# Patient Record
Sex: FEMALE | Race: BLACK OR AFRICAN AMERICAN | Employment: FULL TIME | URBAN - METROPOLITAN AREA
[De-identification: names, ages, dates, MRNs, and addresses within clinical notes are randomized per-mention and may not be internally consistent; named-entity substitution may affect disease eponyms.]

---

## 2017-05-08 ENCOUNTER — OFFICE VISIT (OUTPATIENT)
Dept: ORTHOPEDIC SURGERY | Age: 42
End: 2017-05-08

## 2017-05-08 VITALS
SYSTOLIC BLOOD PRESSURE: 121 MMHG | HEIGHT: 61 IN | DIASTOLIC BLOOD PRESSURE: 68 MMHG | BODY MASS INDEX: 35.87 KG/M2 | WEIGHT: 190 LBS

## 2017-05-08 DIAGNOSIS — M25.461 EFFUSION OF RIGHT KNEE: ICD-10-CM

## 2017-05-08 DIAGNOSIS — M25.561 RIGHT KNEE PAIN, UNSPECIFIED CHRONICITY: Primary | ICD-10-CM

## 2017-05-08 PROCEDURE — 73564 X-RAY EXAM KNEE 4 OR MORE: CPT | Performed by: ORTHOPAEDIC SURGERY

## 2017-05-08 PROCEDURE — 20610 DRAIN/INJ JOINT/BURSA W/O US: CPT | Performed by: ORTHOPAEDIC SURGERY

## 2017-05-08 PROCEDURE — 99214 OFFICE O/P EST MOD 30 MIN: CPT | Performed by: ORTHOPAEDIC SURGERY

## 2017-06-28 DIAGNOSIS — M25.461 EFFUSION OF KNEE JOINT RIGHT: Primary | ICD-10-CM

## 2017-06-28 RX ORDER — MELOXICAM 15 MG/1
15 TABLET ORAL DAILY
Qty: 30 TABLET | Refills: 3 | Status: SHIPPED | OUTPATIENT
Start: 2017-06-28 | End: 2019-12-20

## 2017-06-29 ENCOUNTER — TELEPHONE (OUTPATIENT)
Dept: ORTHOPEDIC SURGERY | Age: 42
End: 2017-06-29

## 2017-07-19 LAB
AVERAGE GLUCOSE: NORMAL
HBA1C MFR BLD: 8.6 %

## 2017-07-24 ENCOUNTER — OFFICE VISIT (OUTPATIENT)
Dept: ORTHOPEDIC SURGERY | Age: 42
End: 2017-07-24

## 2017-07-24 VITALS
HEIGHT: 61 IN | WEIGHT: 182.98 LBS | DIASTOLIC BLOOD PRESSURE: 70 MMHG | SYSTOLIC BLOOD PRESSURE: 126 MMHG | BODY MASS INDEX: 34.55 KG/M2

## 2017-07-24 DIAGNOSIS — S83.231D COMPLEX TEAR OF MEDIAL MENISCUS OF RIGHT KNEE AS CURRENT INJURY, SUBSEQUENT ENCOUNTER: Primary | ICD-10-CM

## 2017-07-24 PROCEDURE — 99213 OFFICE O/P EST LOW 20 MIN: CPT | Performed by: ORTHOPAEDIC SURGERY

## 2017-08-01 ENCOUNTER — OFFICE VISIT (OUTPATIENT)
Dept: ORTHOPEDIC SURGERY | Age: 42
End: 2017-08-01

## 2017-08-01 DIAGNOSIS — S83.241D TEAR OF MEDIAL MENISCUS OF RIGHT KNEE, CURRENT, UNSPECIFIED TEAR TYPE, SUBSEQUENT ENCOUNTER: Primary | ICD-10-CM

## 2017-08-01 PROBLEM — S83.241A TEAR OF MEDIAL MENISCUS OF RIGHT KNEE, CURRENT: Status: ACTIVE | Noted: 2017-08-01

## 2017-08-01 PROCEDURE — 99213 OFFICE O/P EST LOW 20 MIN: CPT | Performed by: ORTHOPAEDIC SURGERY

## 2017-08-09 ENCOUNTER — OFFICE VISIT (OUTPATIENT)
Dept: ORTHOPEDIC SURGERY | Age: 42
End: 2017-08-09

## 2017-08-09 ENCOUNTER — TELEPHONE (OUTPATIENT)
Dept: ORTHOPEDIC SURGERY | Age: 42
End: 2017-08-09

## 2017-08-09 VITALS
WEIGHT: 182.98 LBS | SYSTOLIC BLOOD PRESSURE: 120 MMHG | BODY MASS INDEX: 34.55 KG/M2 | HEIGHT: 61 IN | DIASTOLIC BLOOD PRESSURE: 71 MMHG

## 2017-08-09 DIAGNOSIS — S83.241D TEAR OF MEDIAL MENISCUS OF RIGHT KNEE, CURRENT, UNSPECIFIED TEAR TYPE, SUBSEQUENT ENCOUNTER: Primary | ICD-10-CM

## 2017-08-09 PROCEDURE — 20610 DRAIN/INJ JOINT/BURSA W/O US: CPT | Performed by: ORTHOPAEDIC SURGERY

## 2017-08-09 PROCEDURE — 99212 OFFICE O/P EST SF 10 MIN: CPT | Performed by: ORTHOPAEDIC SURGERY

## 2017-08-09 PROCEDURE — L1832 KO ADJ JNT POS R SUP PRE CST: HCPCS | Performed by: ORTHOPAEDIC SURGERY

## 2017-08-10 ENCOUNTER — TELEPHONE (OUTPATIENT)
Dept: ORTHOPEDIC SURGERY | Age: 42
End: 2017-08-10

## 2017-08-24 ENCOUNTER — HOSPITAL ENCOUNTER (OUTPATIENT)
Dept: SURGERY | Age: 42
Discharge: HOME | End: 2017-08-24
Attending: ORTHOPAEDIC SURGERY | Admitting: ORTHOPAEDIC SURGERY

## 2017-08-24 VITALS
DIASTOLIC BLOOD PRESSURE: 78 MMHG | TEMPERATURE: 97 F | OXYGEN SATURATION: 100 % | WEIGHT: 188.4 LBS | RESPIRATION RATE: 15 BRPM | HEART RATE: 85 BPM | SYSTOLIC BLOOD PRESSURE: 121 MMHG | BODY MASS INDEX: 35.57 KG/M2 | HEIGHT: 61 IN

## 2017-08-24 LAB
GLUCOSE BLD-MCNC: 125 MG/DL (ref 70–99)
GLUCOSE BLD-MCNC: 137 MG/DL (ref 70–99)
HCG(URINE) PREGNANCY TEST: NEGATIVE
PERFORMED ON: ABNORMAL
PERFORMED ON: ABNORMAL
REASON FOR REJECTION: NORMAL
REJECTED TEST: NORMAL

## 2017-08-24 RX ORDER — IBUPROFEN 800 MG/1
800 TABLET ORAL EVERY 6 HOURS PRN
Qty: 60 TABLET | Refills: 3 | Status: SHIPPED | OUTPATIENT
Start: 2017-08-24 | End: 2020-06-22 | Stop reason: ALTCHOICE

## 2017-08-24 RX ORDER — LIDOCAINE HYDROCHLORIDE 10 MG/ML
1 INJECTION, SOLUTION EPIDURAL; INFILTRATION; INTRACAUDAL; PERINEURAL
Status: ACTIVE | OUTPATIENT
Start: 2017-08-24 | End: 2017-08-24

## 2017-08-24 RX ORDER — SODIUM CHLORIDE 9 MG/ML
INJECTION, SOLUTION INTRAVENOUS CONTINUOUS
Status: DISCONTINUED | OUTPATIENT
Start: 2017-08-24 | End: 2017-08-25 | Stop reason: HOSPADM

## 2017-08-24 RX ORDER — SODIUM CHLORIDE 0.9 % (FLUSH) 0.9 %
10 SYRINGE (ML) INJECTION PRN
Status: DISCONTINUED | OUTPATIENT
Start: 2017-08-24 | End: 2017-08-25 | Stop reason: HOSPADM

## 2017-08-24 RX ORDER — PROMETHAZINE HYDROCHLORIDE 25 MG/ML
6.25 INJECTION, SOLUTION INTRAMUSCULAR; INTRAVENOUS EVERY 30 MIN PRN
Status: DISCONTINUED | OUTPATIENT
Start: 2017-08-24 | End: 2017-08-25 | Stop reason: HOSPADM

## 2017-08-24 RX ORDER — SODIUM CHLORIDE 0.9 % (FLUSH) 0.9 %
10 SYRINGE (ML) INJECTION EVERY 12 HOURS SCHEDULED
Status: DISCONTINUED | OUTPATIENT
Start: 2017-08-24 | End: 2017-08-25 | Stop reason: HOSPADM

## 2017-08-24 RX ORDER — FENTANYL CITRATE 50 UG/ML
25 INJECTION, SOLUTION INTRAMUSCULAR; INTRAVENOUS EVERY 5 MIN PRN
Status: DISCONTINUED | OUTPATIENT
Start: 2017-08-24 | End: 2017-08-25 | Stop reason: HOSPADM

## 2017-08-24 RX ORDER — HYDROMORPHONE HCL 110MG/55ML
0.5 PATIENT CONTROLLED ANALGESIA SYRINGE INTRAVENOUS EVERY 5 MIN PRN
Status: DISCONTINUED | OUTPATIENT
Start: 2017-08-24 | End: 2017-08-25 | Stop reason: HOSPADM

## 2017-08-24 RX ORDER — ACETAMINOPHEN 500 MG
1000 TABLET ORAL EVERY 6 HOURS PRN
Qty: 60 TABLET | Refills: 3 | Status: SHIPPED | OUTPATIENT
Start: 2017-08-24 | End: 2020-06-22 | Stop reason: ALTCHOICE

## 2017-08-24 RX ORDER — OXYCODONE HYDROCHLORIDE 5 MG/1
5 TABLET ORAL EVERY 4 HOURS PRN
Qty: 60 TABLET | Refills: 0 | Status: SHIPPED | OUTPATIENT
Start: 2017-08-24 | End: 2017-08-31

## 2017-08-24 RX ORDER — CEFAZOLIN SODIUM 2 G/100ML
2 INJECTION, SOLUTION INTRAVENOUS
Status: COMPLETED | OUTPATIENT
Start: 2017-08-24 | End: 2017-08-24

## 2017-08-24 RX ORDER — HYDROMORPHONE HCL 110MG/55ML
0.25 PATIENT CONTROLLED ANALGESIA SYRINGE INTRAVENOUS EVERY 5 MIN PRN
Status: DISCONTINUED | OUTPATIENT
Start: 2017-08-24 | End: 2017-08-25 | Stop reason: HOSPADM

## 2017-08-24 RX ORDER — LIDOCAINE HYDROCHLORIDE 10 MG/ML
0.5 INJECTION, SOLUTION EPIDURAL; INFILTRATION; INTRACAUDAL; PERINEURAL ONCE
Status: DISCONTINUED | OUTPATIENT
Start: 2017-08-24 | End: 2017-08-25 | Stop reason: HOSPADM

## 2017-08-24 RX ORDER — DIPHENHYDRAMINE HYDROCHLORIDE 50 MG/ML
12.5 INJECTION INTRAMUSCULAR; INTRAVENOUS
Status: ACTIVE | OUTPATIENT
Start: 2017-08-24 | End: 2017-08-24

## 2017-08-24 RX ORDER — KETOROLAC TROMETHAMINE 30 MG/ML
30 INJECTION, SOLUTION INTRAMUSCULAR; INTRAVENOUS ONCE
Status: COMPLETED | OUTPATIENT
Start: 2017-08-24 | End: 2017-08-24

## 2017-08-24 RX ORDER — HYDROCODONE BITARTRATE AND ACETAMINOPHEN 5; 325 MG/1; MG/1
1 TABLET ORAL PRN
Status: ACTIVE | OUTPATIENT
Start: 2017-08-24 | End: 2017-08-24

## 2017-08-24 RX ORDER — FENTANYL CITRATE 50 UG/ML
50 INJECTION, SOLUTION INTRAMUSCULAR; INTRAVENOUS EVERY 5 MIN PRN
Status: DISCONTINUED | OUTPATIENT
Start: 2017-08-24 | End: 2017-08-25 | Stop reason: HOSPADM

## 2017-08-24 RX ORDER — HYDROCODONE BITARTRATE AND ACETAMINOPHEN 5; 325 MG/1; MG/1
2 TABLET ORAL PRN
Status: ACTIVE | OUTPATIENT
Start: 2017-08-24 | End: 2017-08-24

## 2017-08-24 RX ORDER — MEPERIDINE HYDROCHLORIDE 25 MG/ML
12.5 INJECTION INTRAMUSCULAR; INTRAVENOUS; SUBCUTANEOUS EVERY 5 MIN PRN
Status: DISCONTINUED | OUTPATIENT
Start: 2017-08-24 | End: 2017-08-25 | Stop reason: HOSPADM

## 2017-08-24 RX ADMIN — Medication: at 14:42

## 2017-08-24 RX ADMIN — CEFAZOLIN SODIUM 2 G: 2 INJECTION, SOLUTION INTRAVENOUS at 12:26

## 2017-08-24 RX ADMIN — KETOROLAC TROMETHAMINE 30 MG: 30 INJECTION, SOLUTION INTRAMUSCULAR; INTRAVENOUS at 14:31

## 2017-08-24 RX ADMIN — Medication 0.5 MG: at 14:49

## 2017-08-24 ASSESSMENT — PAIN SCALES - GENERAL
PAINLEVEL_OUTOF10: 10
PAINLEVEL_OUTOF10: 9
PAINLEVEL_OUTOF10: 10

## 2017-08-24 ASSESSMENT — PAIN - FUNCTIONAL ASSESSMENT: PAIN_FUNCTIONAL_ASSESSMENT: 0-10

## 2017-08-24 ASSESSMENT — PAIN DESCRIPTION - DESCRIPTORS: DESCRIPTORS: ACHING

## 2017-08-25 DIAGNOSIS — S83.241D ACUTE MEDIAL MENISCUS TEAR OF RIGHT KNEE, SUBSEQUENT ENCOUNTER: Primary | ICD-10-CM

## 2017-08-30 ENCOUNTER — OFFICE VISIT (OUTPATIENT)
Dept: ORTHOPEDIC SURGERY | Age: 42
End: 2017-08-30

## 2017-08-30 DIAGNOSIS — S83.241D TEAR OF MEDIAL MENISCUS OF RIGHT KNEE, CURRENT, UNSPECIFIED TEAR TYPE, SUBSEQUENT ENCOUNTER: Primary | ICD-10-CM

## 2017-08-30 PROCEDURE — 99024 POSTOP FOLLOW-UP VISIT: CPT | Performed by: ORTHOPAEDIC SURGERY

## 2017-09-11 ENCOUNTER — HOSPITAL ENCOUNTER (OUTPATIENT)
Dept: PHYSICAL THERAPY | Age: 42
Discharge: HOME | End: 2017-09-30
Admitting: ORTHOPAEDIC SURGERY

## 2017-09-11 NOTE — PLAN OF CARE
today with double crutches PWB and no brace. Pt instructed as NWB and to wear brace. Orthopedic Special Tests: NT d/t recent sx                        [x] Patient history, allergies, meds reviewed. Medical chart reviewed. See intake form. Review Of Systems (ROS):  [x]Performed Review of systems (Integumentary, CardioPulmonary, Neurological) by intake and observation. Intake form has been scanned into medical record. Patient has been instructed to contact their primary care physician regarding ROS issues if not already being addressed at this time. Co-morbidities/Complexities (which will affect course of rehabilitation):   []None           Arthritic conditions   []Rheumatoid arthritis (M05.9)  []Osteoarthritis (M19.91)   Cardiovascular conditions   []Hypertension (I10)  []Hyperlipidemia (E78.5)  []Angina pectoris (I20)  []Atherosclerosis (I70)   Musculoskeletal conditions   []Disc pathology   []Congenital spine pathologies   []Prior surgical intervention  []Osteoporosis (M81.8)  []Osteopenia (M85.8)   Endocrine conditions   []Hypothyroid (E03.9)  []Hyperthyroid Gastrointestinal conditions   []Constipation (A33.27)   Metabolic conditions   []Morbid obesity (E66.01)  [x]Diabetes type 1(E10.65) or 2 (E11.65)   []Neuropathy (G60.9)     Pulmonary conditions   []Asthma (J45)  []Coughing   []COPD (J44.9)   Psychological Disorders  []Anxiety (F41.9)  []Depression (F32.9)   []Other:   []Other:          Barriers to/and or personal factors that will affect rehab potential:              [x]Age  [x]Sex              [x]Motivation/Lack of Motivation                        []Co-Morbidities              []Cognitive Function, education/learning barriers              []Environmental, home barriers              []profession/work barriers  [x]past PT/medical experience  []other:  Justification:    Falls Risk Assessment (30 days):  [x] Falls Risk assessed and no intervention required.   [] Falls Risk assessed and Patient requires intervention due to being higher risk   TUG score (>12s at risk):     [] Falls education provided, including       G-Codes:  PT G-Codes  Functional Assessment Tool Used: LEFS  Score: 9/80  Functional Limitation: Mobility: Walking and moving around  Mobility: Walking and Moving Around Current Status (): At least 60 percent but less than 80 percent impaired, limited or restricted  Mobility: Walking and Moving Around Goal Status (): At least 1 percent but less than 20 percent impaired, limited or restricted    ASSESSMENT:   Functional Impairments:     [x]Noted lumbar/proximal hip/LE joint hypomobility   [x]Decreased LE functional ROM   [x]Decreased core/proximal hip strength and neuromuscular control   [x]Decreased LE functional strength   [x]Reduced balance/proprioceptive control   []other:      Functional Activity Limitations (from functional questionnaire and intake)   []Reduced ability to tolerate prolonged functional positions   []Reduced ability or difficulty with changes of positions or transfers between positions   []Reduced ability to maintain good posture and demonstrate good body mechanics with sitting, bending, and lifting   []Reduced ability to sleep   [x] Reduced ability or tolerance with driving and/or computer work   []Reduced ability to perform lifting, carrying tasks   [x]Reduced ability to squat   []Reduced ability to forward bend   [x]Reduced ability to ambulate prolonged functional periods/distances/surfaces   [x]Reduced ability to ascend/descend stairs   [x]Reduced ability to run, hop, cut or jump   []other:    Participation Restrictions   [x]Reduced participation in self care activities   [x]Reduced participation in home management activities   [x]Reduced participation in work activities   [x]Reduced participation in social activities. [x]Reduced participation in sport/recreation activities.     Classification :    [x]Signs/symptoms consistent with post-surgical status Therapeutic exercise including: strength training, ROM, for Lower extremity and core   [x]  NMR activation and proprioception for LE, Glutes and Core   [x]  Manual therapy as indicated for LE, Hip and spine to include: Dry Needling/IASTM, STM, PROM, Gr I-IV mobilizations, manipulation. [x] Modalities as needed that may include: thermal agents, E-stim, Biofeedback, US, iontophoresis as indicated  [x] Patient education on joint protection, postural re-education, activity modification, progression of HEP. HEP instruction:(see scanned forms)    GOALS:  Patient stated goal: Return to all normal activity     Therapist goals for Patient:   Short Term Goals: To be achieved in: 2 weeks  1. Independent in HEP and progression per patient tolerance, in order to prevent re-injury. 2. Patient will have a decrease in pain to facilitate improvement in movement, function, and ADLs as indicated by Functional Deficits. Long Term Goals: To be achieved in: 12 weeks  1. Disability index score of 25% or less for the LEFS to assist with reaching prior level of function. 2. Patient will demonstrate increased AROM to 0-115 degrees to allow for proper joint functioning as indicated by patients Functional Deficits. 3. Patient will demonstrate an increase in Strength to good proximal hip strength and control in LE to allow for proper functional mobility as indicated by patients Functional Deficits. 4. Patient will return to all functional activities without increased symptoms or restriction. 5. Pt will ambulate with a normalized gait pattern with LRAD. Electronically signed by:   Any Moyer PT

## 2017-09-11 NOTE — FLOWSHEET NOTE
[address], [facility]    Physical Therapy Daily Treatment Note  Date:  2017    Patient Name:  Sapphire Giles    :  1975  MRN: [id number]  Restrictions/Precautions:    Medical/Treatment Diagnosis Information:  Diagnosis: S83.241D (Medial meniscus tear R)  Treatment Diagnosis: S83.241D (Medial meniscus tear R)  Insurance/Certification information:  PT Insurance Information:   Physician Information:  Referring Practitioner: Dr. Glover Presume of care signed (Y/N):     Date of Patient follow up with Physician:     G-Code (if applicable):      Date G-Code Applied:    PT G-Codes  Functional Assessment Tool Used: LEFS  Score:   Functional Limitation: Mobility: Walking and moving around  Mobility: Walking and Moving Around Current Status (): At least 60 percent but less than 80 percent impaired, limited or restricted  Mobility: Walking and Moving Around Goal Status ():  At least 1 percent but less than 20 percent impaired, limited or restricted    Progress Note: [x]  Yes  []  No  Next due by: Visit #10       Latex Allergy:  [x]NO      []YES  Preferred Language for Healthcare:   [x]English       []other:    Visit # Insurance Allowable    30     Pain level:  6/10     SUBJECTIVE:  See eval    OBJECTIVE: See eval  Observation:   Test measurements:      RESTRICTIONS/PRECAUTIONS: R meniscal repair 17    Exercises/Interventions:     Therapeutic Ex Resistance Sets/sec Reps Notes   Retro Stepper/BIKE       Quad sets  3 10    3 way t-band ankle  2 10    3 way SLR       SAQ       Clam ABD       Hip Ext Kaitlin Ervin       Bosu fwd/side lunge       Slide Lunge       Leg Press Ecc 0-       Cybex HS curl       TKE       Glute side walks       BOSU squat              Extension prop  10 10 s           Manual Intervention       Knee mobs/PROM  6 min  Ext grade 1/2   Tib/Fem Mobs       Patella Mobs  2 min     Ankle mobs                     NMR

## 2017-09-20 ENCOUNTER — HOSPITAL ENCOUNTER (OUTPATIENT)
Dept: PHYSICAL THERAPY | Age: 42
Discharge: HOME | End: 2017-09-20
Admitting: ORTHOPAEDIC SURGERY

## 2017-09-22 ENCOUNTER — HOSPITAL ENCOUNTER (OUTPATIENT)
Dept: PHYSICAL THERAPY | Age: 42
Discharge: HOME | End: 2017-09-22
Admitting: ORTHOPAEDIC SURGERY

## 2017-10-02 ENCOUNTER — HOSPITAL ENCOUNTER (OUTPATIENT)
Dept: PHYSICAL THERAPY | Age: 42
Discharge: HOME | End: 2017-10-02
Admitting: ORTHOPAEDIC SURGERY

## 2017-10-02 NOTE — FLOWSHEET NOTE
EOB Knee flexion AAROM  10\" 10    Extension prop       longsit hamstring/ gastroc stretch  30\" 3x ea    Knee flexion ROM/ SB rolls       Manual Intervention  15'       Knee mobs/PROM  8 min  Ext grade 1/2   Tib/Fem Mobs       Patella Mobs  8 min     Ankle mobs                     NMR re-education       Tunisian/Biofeedback 10/10       G. Med activaiton/sidelying       G. Max Activation/prone       Hip Ext full ROM G. Activation       Bosu Bal and Prop- G Med       Single leg stance/Balance/Prop       Bosu Retro G. Med act        [place] Estim with quad sets  5'  10\" on/10\" off              Therapeutic Exercise and NMR EXR  [] (83342) Provided verbal/tactile cueing for activities related to strengthening, flexibility, endurance, ROM for improvements in LE, proximal hip, and core control with self care, mobility, lifting, ambulation.  [] (06299) Provided verbal/tactile cueing for activities related to improving balance, coordination, kinesthetic sense, posture, motor skill, proprioception  to assist with LE, proximal hip, and core control in self care, mobility, lifting, ambulation and eccentric single leg control.      NMR and Therapeutic Activities:    [] (85465 or 08311) Provided verbal/tactile cueing for activities related to improving balance, coordination, kinesthetic sense, posture, motor skill, proprioception and motor activation to allow for proper function of core, proximal hip and LE with self care and ADLs  [] (23308) Gait Re-education- Provided training and instruction to the patient for proper LE, core and proximal hip recruitment and positioning and eccentric body weight control with ambulation re-education including up and down stairs     Home Exercise Program:    [x] (68761) Reviewed/Progressed HEP activities related to strengthening, flexibility, endurance, ROM of core, proximal hip and LE for functional self-care, mobility, lifting and ambulation/stair navigation   [] (81884)Reviewed/Progressed HEP activities related to improving balance, coordination, kinesthetic sense, posture, motor skill, proprioception of core, proximal hip and LE for self care, mobility, lifting, and ambulation/stair navigation      Manual Treatments:  PROM / STM / Oscillations-Mobs:  G-I, II, III, IV (PA's, Inf., Post.)  [x] (99856) Provided manual therapy to mobilize LE, proximal hip and/or LS spine soft tissue/joints for the purpose of modulating pain, promoting relaxation,  increasing ROM, reducing/eliminating soft tissue swelling/inflammation/restriction, improving soft tissue extensibility and allowing for proper ROM for normal function with self care, mobility, lifting and ambulation. Modalities:  13' PM/CP    Charges:  Timed Code Treatment Minutes: 50 min   Total Treatment Minutes: 65     [] EVAL  [x] JA(83682) x  1   [] IONTO   [x] NMR (89284) x  1   [] VASO  [x] Manual (65122) x  1    [] Other:  [] TA x       [] Mech Traction (84622)  [] ES(attended) (02639)      [x] ES (un) (43747):     GOALS:   Patient stated goal: Return to all normal activity     Therapist goals for Patient:   Short Term Goals: To be achieved in: 2 weeks  1. Independent in HEP and progression per patient tolerance, in order to prevent re-injury. 2. Patient will have a decrease in pain to facilitate improvement in movement, function, and ADLs as indicated by Functional Deficits. Long Term Goals: To be achieved in: 12 weeks  1. Disability index score of 25% or less for the LEFS to assist with reaching prior level of function. 2. Patient will demonstrate increased AROM to 0-115 degrees to allow for proper joint functioning as indicated by patients Functional Deficits. 3. Patient will demonstrate an increase in Strength to good proximal hip strength and control in LE to allow for proper functional mobility as indicated by patients Functional Deficits. 4. Patient will return to all functional activities without increased symptoms or restriction. 5. Pt will ambulate with a normalized gait pattern with LRAD. Progression Towards Functional goals:  [] Patient is progressing as expected towards functional goals listed. [] Progression is slowed due to complexities listed. [] Progression has been slowed due to co-morbidities. [x] Plan just implemented, too soon to assess goals progression  [] Other:     ASSESSMENT:           Treatment/Activity Tolerance:  [] Patient tolerated treatment well [x] Patient limited by fatique  [] Patient limited by pain  [x] Other: continues to demonstrate poor slow VMO, quad contraction, knee flexion and extension have improved    Prognosis: [x] Good [] Fair  [] Poor    Patient Requires Follow-up: [x] Yes  [] No    PLAN: Pt to continue with PT 2x/week. Pt to maintain TTWB status and knee flexion limited to 90 degrees until 6 weeks s/p. Pt sees MD on 10/13/17.  Progress HEP as able, increase quad activity  [x] Continue per plan of care [] Alter current plan (see comments)  [] Plan of care initiated [] Hold pending MD visit [] Discharge    Electronically signed by: Leeann Rodgers PTA [id number]

## 2017-10-10 ENCOUNTER — HOSPITAL ENCOUNTER (OUTPATIENT)
Dept: PHYSICAL THERAPY | Age: 42
Discharge: HOME | End: 2017-10-10
Admitting: ORTHOPAEDIC SURGERY

## 2017-10-10 NOTE — FLOWSHEET NOTE
[address], [facility]    Physical Therapy Daily Treatment Note  Date:  10/10/2017    Patient Name:  Dawit Gonzalez    :  1975  MRN: [id number]  Restrictions/Precautions:    Medical/Treatment Diagnosis Information:  Diagnosis: S83.241D (Medial meniscus tear R)  Treatment Diagnosis: S83.241D (Medial meniscus tear R)  Insurance/Certification information:  PT Insurance Information:   Physician Information:  Referring Practitioner: Dr. Perez Portal of care signed (Y/N):     Date of Patient follow up with Physician:     G-Code (if applicable):      Date G-Code Applied:         Progress Note: [x]  Yes  []  No  Next due by: Visit #10       Latex Allergy:  [x]NO      []YES  Preferred Language for Healthcare:   [x]English       []other:    Visit # Insurance Allowable        Pain level:  2/10     SUBJECTIVE:  6+ weeks s/p. Pt reports that her knee has been feeling pretty good. Pt states that she has been ambulating with her brace, but with no crutches. Pt to follow up with MD 10-13-17. OBJECTIVE:   Observation:   Test measurements: 17: Knee flexion AAROM = 70 degrees (w/ EOB knee flexion)    17   ROM/ 75 w/ SB, tight hamstrings w/ PROM. Poor quad contraction. Ecchymosis over distal quad  10/2/17: Knee flexion ROM = 90, extension AROM = lacking 5 degrees at beginning of session, lacking 3 degrees at end of session   10/10/17: Pt ambulated into PT with antalgic gait pattern, wearing brace and without crutches.      RESTRICTIONS/PRECAUTIONS: R meniscal repair 17    Exercises/Interventions:     Therapeutic Ex  38' Resistance Sets/sec Reps Notes   Retro Stepper/BIKE       Quad sets In towel  3 10    3 way t-band ankle    SLR flexion/ abd  Hip BS adduction  2  10\" 10  10x    SAQ  2 10 5\" hold   Clam ABD orange 2 15    Bridges with SB  2 10    Bosu fwd/side lunge       Slide Lunge       Leg Press Ecc 0-       Cybex HS curl TKE blue 2 10 5\" hold   Glute side walks       BOSU squat       EOB Knee flexion AAROM     Extension prop       longsit hamstring/ gastroc stretch  30\" 3x ea    Mini squats  2 10 0-45 degrees   Leg Press-DL 60# 2 10 45-0 degrees   Knee flexion ROM/ SB rolls  3'     Manual Intervention  12'       Knee mobs/PROM  8 min  Ext grade 1/2   Tib/Fem Mobs       Patella Mobs  4 min     Ankle mobs                     NMR re-education       Micronesian/Biofeedback 10/10       G. Med activaiton/sidelying       G. Max Activation/prone       Hip Ext full ROM G. Activation       Bosu Bal and Prop- G Med       Single leg stance/Balance/Prop       Bosu Retro G. Med act        [place] Estim with quad sets             Therapeutic Exercise and NMR EXR  [] (73511) Provided verbal/tactile cueing for activities related to strengthening, flexibility, endurance, ROM for improvements in LE, proximal hip, and core control with self care, mobility, lifting, ambulation.  [] (30159) Provided verbal/tactile cueing for activities related to improving balance, coordination, kinesthetic sense, posture, motor skill, proprioception  to assist with LE, proximal hip, and core control in self care, mobility, lifting, ambulation and eccentric single leg control.      NMR and Therapeutic Activities:    [] (62725 or 93568) Provided verbal/tactile cueing for activities related to improving balance, coordination, kinesthetic sense, posture, motor skill, proprioception and motor activation to allow for proper function of core, proximal hip and LE with self care and ADLs  [] (32705) Gait Re-education- Provided training and instruction to the patient for proper LE, core and proximal hip recruitment and positioning and eccentric body weight control with ambulation re-education including up and down stairs     Home Exercise Program:    [x] (40048) Reviewed/Progressed HEP activities related to strengthening, flexibility, endurance, ROM of core, proximal hip and LE for functional self-care, mobility, lifting and ambulation/stair navigation   [] (13709)Reviewed/Progressed HEP activities related to improving balance, coordination, kinesthetic sense, posture, motor skill, proprioception of core, proximal hip and LE for self care, mobility, lifting, and ambulation/stair navigation      Manual Treatments:  PROM / STM / Oscillations-Mobs:  G-I, II, III, IV (PA's, Inf., Post.)  [x] (95905) Provided manual therapy to mobilize LE, proximal hip and/or LS spine soft tissue/joints for the purpose of modulating pain, promoting relaxation,  increasing ROM, reducing/eliminating soft tissue swelling/inflammation/restriction, improving soft tissue extensibility and allowing for proper ROM for normal function with self care, mobility, lifting and ambulation. Modalities:  13' PM/CP    Charges:  Timed Code Treatment Minutes: 50 min   Total Treatment Minutes: 65     [] EVAL  [x] BW(41341) x  2   [] IONTO   [] NMR (61687) x      [] VASO  [x] Manual (64913) x  1    [] Other:  [] TA x       [] Mech Traction (76675)  [] ES(attended) (73478)      [x] ES (un) (90918):     GOALS:   Patient stated goal: Return to all normal activity     Therapist goals for Patient:   Short Term Goals: To be achieved in: 2 weeks  1. Independent in HEP and progression per patient tolerance, in order to prevent re-injury. 2. Patient will have a decrease in pain to facilitate improvement in movement, function, and ADLs as indicated by Functional Deficits. Long Term Goals: To be achieved in: 12 weeks  1. Disability index score of 25% or less for the LEFS to assist with reaching prior level of function. 2. Patient will demonstrate increased AROM to 0-115 degrees to allow for proper joint functioning as indicated by patients Functional Deficits.    3. Patient will demonstrate an increase in Strength to good proximal hip strength and control in LE to allow for proper functional mobility as indicated by patients Functional Deficits. 4. Patient will return to all functional activities without increased symptoms or restriction. 5. Pt will ambulate with a normalized gait pattern with LRAD. Progression Towards Functional goals:  [] Patient is progressing as expected towards functional goals listed. [] Progression is slowed due to complexities listed. [] Progression has been slowed due to co-morbidities. [x] Plan just implemented, too soon to assess goals progression  [] Other:     ASSESSMENT:  Pt demonstrates slight improvement in quad activation, but continued significant weakness and neuromuscular control noted. Pt continues to lack full knee extension AROM due to pain and quad weakness. Increased therex to improve quad activation and strength. Treatment/Activity Tolerance:  [x] Patient tolerated treatment well [] Patient limited by fatique  [] Patient limited by pain  [] Other:     Prognosis: [x] Good [] Fair  [] Poor    Patient Requires Follow-up: [x] Yes  [] No    PLAN: Pt to continue with PT 2x/week. Pt sees MD on 10-13-17.   [x] Continue per plan of care [] Alter current plan (see comments)  [] Plan of care initiated [] Hold pending MD visit [] Discharge    Electronically signed by: Fareed Alarcon PTA [id number]

## 2017-10-13 ENCOUNTER — OFFICE VISIT (OUTPATIENT)
Dept: ORTHOPEDIC SURGERY | Age: 42
End: 2017-10-13

## 2017-10-13 VITALS — BODY MASS INDEX: 36.91 KG/M2 | WEIGHT: 188 LBS | HEIGHT: 60 IN

## 2017-10-13 DIAGNOSIS — S83.241D TEAR OF MEDIAL MENISCUS OF RIGHT KNEE, CURRENT, UNSPECIFIED TEAR TYPE, SUBSEQUENT ENCOUNTER: Primary | ICD-10-CM

## 2017-10-13 PROCEDURE — 99024 POSTOP FOLLOW-UP VISIT: CPT | Performed by: ORTHOPAEDIC SURGERY

## 2017-10-13 PROCEDURE — L1810 KO ELASTIC WITH JOINTS: HCPCS | Performed by: ORTHOPAEDIC SURGERY

## 2017-10-13 NOTE — PROGRESS NOTES
Chief Complaint    Pain (right knee)      History of Present Illness:  Romana Pippins is a 36 y.o. female. She is here for follow-up for her right knee. She is status post right knee arthroscopy with repair of the medial meniscal root. She's doing well at this time. She has been off of her crutches now for about a week and a half. She states she does have some soreness over the medial side of the knee but does feel like it's getting progressively better. Denies radicular pain or numbness or tingling. Medical History:  Patient's medications, allergies, past medical, surgical, social and family histories were reviewed and updated as appropriate. Review of Systems:  Pertinent items are noted in HPI  Review of systems reviewed from Patient History Form dated on 10.13.17   and available in the patient's chart under the Media tab. Vital Signs:  Ht 5' 0.05\" (1.525 m)   Wt 188 lb (85.3 kg)   BMI 36.66 kg/m²     General Exam:   Constitutional: Patient is adequately groomed with no evidence of malnutrition  DTRs: Deep tendon reflexes are intact  Mental Status: The patient is oriented to time, place and person. The patient's mood and affect are appropriate. Knee Examination:    Inspection:  Portal sites are well-healed. No erythema or drainage    Palpation:  Mild palpable effusion within the right knee. There is some tenderness along the posterior medial joint line    Range of Motion:  0° up to 110°    Strength:  She is able to do straight leg raise    Special Tests:  No instability to varus and valgus stress testing    Skin: There are no rashes, ulcerations or lesions. Additional Comments:       Additional Examinations:         Left Lower Extremity: Examination of the left lower extremity does not show any tenderness, deformity or injury. Range of motion is unremarkable. There is no gross instability. There are no rashes, ulcerations or lesions.   Strength and tone are

## 2017-10-13 NOTE — LETTER
[facility]  [address]  [address] [facility] [address]  Phone: [phone]  Fax: [phone]    Karine Flowers MD        October 13, 2017     Patient: Romana Pippins   YOB: 1975   Date of Visit: 10/13/2017       To Whom it May Concern:    Sneha Pugh was seen in my clinic on 10/13/2017. She may return back to work without restrictions effective October 16, 2017    If you have any questions or concerns, please don't hesitate to call.     Sincerely,         Karine Flowers MD

## 2017-10-19 ENCOUNTER — HOSPITAL ENCOUNTER (OUTPATIENT)
Dept: PHYSICAL THERAPY | Age: 42
Discharge: HOME | End: 2017-10-19
Admitting: ORTHOPAEDIC SURGERY

## 2017-10-20 ENCOUNTER — HOSPITAL ENCOUNTER (OUTPATIENT)
Dept: PHYSICAL THERAPY | Age: 42
Discharge: HOME | End: 2017-10-20
Admitting: ORTHOPAEDIC SURGERY

## 2017-10-20 NOTE — FLOWSHEET NOTE
[place] , [facility]    Physical Therapy Daily Treatment Note  Date:  10/20/2017    Patient Name:  Humberto Gan    :  1975  MRN: [id number]  Restrictions/Precautions:    Medical/Treatment Diagnosis Information:  Diagnosis: S83.241D (Medial meniscus tear R)  Treatment Diagnosis: S83.241D (Medial meniscus tear R)  Insurance/Certification information:  PT Insurance Information:   Physician Information:  Referring Practitioner: Dr. Estelita Phillips of care signed (Y/N):     Date of Patient follow up with Physician: Marjorie Fuentes     G-Code (if applicable):      Date G-Code Applied:         Progress Note: [x]  Yes  []  No  Next due by: Visit #10       Latex Allergy:  [x]NO      []YES  Preferred Language for Healthcare:   [x]English       []other:    Visit # Insurance Allowable        Pain level:  2/10     SUBJECTIVE: Starts new job next week and admits to not being able to come to therapy b/c of job interviews etc. Knee sore today from workout yesterday, time constraints today due to schedule. OBJECTIVE:   Observation:   Test measurements: 17: Knee flexion AAROM = 70 degrees (w/ EOB knee flexion)    17   ROM/ 75 w/ SB, tight hamstrings w/ PROM. Poor quad contraction. Ecchymosis over distal quad  10/2/17: Knee flexion ROM = 90, extension AROM = lacking 5 degrees at beginning of session, lacking 3 degrees at end of session   10/10/17: Pt ambulated into PT with antalgic gait pattern, wearing brace and without crutches.      RESTRICTIONS/PRECAUTIONS: R meniscal repair 17    Exercises/Interventions:     Therapeutic Ex   Resistance Sets/sec Reps Notes   Retro Stepper/BIKE       Quad sets   3 10    Slr+  30\"     SLR flexion/ Abd  Hip BS adduction 2#  2# 2  10\" 10  10x    SAQ 2# 2 10  w/3 sec ecc. lower   Clam ABD    Bridges with SB  2 10    Bosu fwd/side lunge       Slide Lunge       Cybex HS curl       TKE 5\" hold   Glute side motor skill, proprioception of core, proximal hip and LE for self care, mobility, lifting, and ambulation/stair navigation      Manual Treatments:  PROM / STM / Oscillations-Mobs:  G-I, II, III, IV (PA's, Inf., Post.)  [x] (51507) Provided manual therapy to mobilize LE, proximal hip and/or LS spine soft tissue/joints for the purpose of modulating pain, promoting relaxation,  increasing ROM, reducing/eliminating soft tissue swelling/inflammation/restriction, improving soft tissue extensibility and allowing for proper ROM for normal function with self care, mobility, lifting and ambulation. Modalities:  10' PM/CP    Charges:  Timed Code Treatment Minutes: 30   Total Treatment Minutes: 40     [] EVAL  [x] QI(53814) x  1   [] IONTO   [] NMR (54659) x      [] VASO  [] Manual (49720) x       [] Other:  [] TA x       [] Mech Traction (90125)  [] ES(attended) (09818)      [x] ES (un) (35158):     GOALS:   Patient stated goal: Return to all normal activity     Therapist goals for Patient:   Short Term Goals: To be achieved in: 2 weeks  1. Independent in HEP and progression per patient tolerance, in order to prevent re-injury. 2. Patient will have a decrease in pain to facilitate improvement in movement, function, and ADLs as indicated by Functional Deficits. Long Term Goals: To be achieved in: 12 weeks  1. Disability index score of 25% or less for the LEFS to assist with reaching prior level of function. 2. Patient will demonstrate increased AROM to 0-115 degrees to allow for proper joint functioning as indicated by patients Functional Deficits. 3. Patient will demonstrate an increase in Strength to good proximal hip strength and control in LE to allow for proper functional mobility as indicated by patients Functional Deficits. 4. Patient will return to all functional activities without increased symptoms or restriction. 5. Pt will ambulate with a normalized gait pattern with LRAD.      Progression Towards

## 2017-10-30 ENCOUNTER — HOSPITAL ENCOUNTER (OUTPATIENT)
Dept: PHYSICAL THERAPY | Facility: MEDICAL CENTER | Age: 42
Discharge: HOME | End: 2017-10-31
Attending: ORTHOPAEDIC SURGERY | Admitting: ORTHOPAEDIC SURGERY

## 2017-10-30 NOTE — FLOWSHEET NOTE
The [facility], [address] [facility], [address]          Phone: [phone]   Fax:     [phone]        Physical Therapy Daily Treatment Note  Date:  10/30/2017    Patient Name:  Girish Anderson    :  1975  MRN: [id number]  Restrictions/Precautions:    Medical/Treatment Diagnosis Information:  Diagnosis: S83.241D (Medial meniscus tear R)   Treatment Diagnosis: S83.241D (Medial meniscus tear R)  Insurance/Certification information:  PT Insurance Information:   Physician Information:  Referring Practitioner: Dr. Teodoro Oppenheim of care signed (Y/N):     Date of Patient follow up with Physician: Jillian Hernandez     G-Code (if applicable):      Date G-Code Applied:         Progress Note: [x]  Yes  []  No  Next due by: Visit #10       Latex Allergy:  [x]NO      []YES  Preferred Language for Healthcare:   [x]English       []other:    Visit # Insurance Allowable        Pain level:  1-2/10     SUBJECTIVE: Pt reports this is the best she has felt in a long time. She reports her knee does not hurt constantly. She has been performing her HEP and going up and down stairs. She has not worn heels but can't wait to wear them again. OBJECTIVE:   Observation:   Test measurements: 17: Knee flexion AAROM = 70 degrees (w/ EOB knee flexion)    17   ROM/ 75 w/ SB, tight hamstrings w/ PROM. Poor quad contraction. Ecchymosis over distal quad  10/2/17: Knee flexion ROM = 90, extension AROM = lacking 5 degrees at beginning of session, lacking 3 degrees at end of session   10/10/17: Pt ambulated into PT with antalgic gait pattern, wearing brace and without crutches.      RESTRICTIONS/PRECAUTIONS: R meniscal repair 17    Exercises/Interventions:     Therapeutic Ex   Resistance Sets/sec Reps Notes   Retro Stepper/BIKE       Quad sets   3 10    Slr+  30\"  3   SLR flexion/ Abd  Hip BS adduction 2#  2# 2  10\" 10  10x SAQ 2# 2 10  w/3 sec ecc. lower   Clam ABD    Bridges with SB   2 10    Bosu fwd/side lunge       Slide Lunge       Cybex HS curl       TKE 5\" hold   Glute side walks       BOSU squat                 longsit hamstring/ gastroc stretch  30\" 3x ea Hold 10/19 due to time   Mini squats- heel on half roll   2 10 0-45 degrees   Leg Press-DL Knee flexion ROM/ SB rolls Forward step ups Manual Intervention         Knee mobs/PROM/STM R quad, scar mob  8 min  Ext grade 1/2   Hold 10/19 due to time   Patella Mobs  4 min  Hold 10/19 due to time                 NMR re-education       Bosu Bal and Prop- G Med       Single leg stance/Balance/Prop   3 30\" Added 10/30   Bosu Retro G. Med act                    Therapeutic Exercise and NMR EXR  [x] (76778) Provided verbal/tactile cueing for activities related to strengthening, flexibility, endurance, ROM for improvements in LE, proximal hip, and core control with self care, mobility, lifting, ambulation.  [] (86213) Provided verbal/tactile cueing for activities related to improving balance, coordination, kinesthetic sense, posture, motor skill, proprioception  to assist with LE, proximal hip, and core control in self care, mobility, lifting, ambulation and eccentric single leg control.      NMR and Therapeutic Activities:  UPDATE NPV  [] (54374 or 19390) Provided verbal/tactile cueing for activities related to improving balance, coordination, kinesthetic sense, posture, motor skill, proprioception and motor activation to allow for proper function of core, proximal hip and LE with self care and ADLs  [] (96891) Gait Re-education- Provided training and instruction to the patient for proper LE, core and proximal hip recruitment and positioning and eccentric body weight control with ambulation re-education including up and down stairs     Home Exercise Program:    [x] (76749) Reviewed/Progressed HEP activities related to strengthening, flexibility, endurance, ROM of core, proximal hip and LE for functional self-care, mobility, lifting and ambulation/stair navigation   [] (35508)Reviewed/Progressed HEP activities related to improving balance, coordination, kinesthetic sense, posture, motor skill, proprioception of core, proximal hip and LE for self care, mobility, lifting, and ambulation/stair navigation      Manual Treatments:  PROM / STM / Oscillations-Mobs:  G-I, II, III, IV (PA's, Inf., Post.)  [x] (92865) Provided manual therapy to mobilize LE, proximal hip and/or LS spine soft tissue/joints for the purpose of modulating pain, promoting relaxation,  increasing ROM, reducing/eliminating soft tissue swelling/inflammation/restriction, improving soft tissue extensibility and allowing for proper ROM for normal function with self care, mobility, lifting and ambulation. Modalities:  10' PM/CP    Charges:  Timed Code Treatment Minutes: 37'   Total Treatment Minutes: 6:05-7:03  62'     [] EVAL  [x] ZH(04046) x  2   [] IONTO   [] NMR (14889) x      [] VASO  [x] Manual (07075) x  1    [] Other:  [] TA x       [] Mech Traction (82619)  [] ES(attended) (81127)      [] ES (un) (02839):     GOALS:   Patient stated goal: Return to all normal activity     Therapist goals for Patient:   Short Term Goals: To be achieved in: 2 weeks  1. Independent in HEP and progression per patient tolerance, in order to prevent re-injury. 2. Patient will have a decrease in pain to facilitate improvement in movement, function, and ADLs as indicated by Functional Deficits. Long Term Goals: To be achieved in: 12 weeks  1. Disability index score of 25% or less for the LEFS to assist with reaching prior level of function. 2. Patient will demonstrate increased AROM to 0-115 degrees to allow for proper joint functioning as indicated by patients Functional Deficits.    3. Patient will demonstrate an increase in Strength to good proximal hip strength and control in LE to allow for proper functional mobility as indicated by

## 2017-11-01 ENCOUNTER — HOSPITAL ENCOUNTER (OUTPATIENT)
Dept: OTHER | Age: 42
Discharge: HOME | End: 2017-11-30
Attending: ORTHOPAEDIC SURGERY | Admitting: ORTHOPAEDIC SURGERY

## 2017-11-01 ENCOUNTER — HOSPITAL ENCOUNTER (OUTPATIENT)
Dept: PHYSICAL THERAPY | Age: 42
Discharge: HOME | End: 2017-11-30
Attending: ORTHOPAEDIC SURGERY | Admitting: ORTHOPAEDIC SURGERY

## 2017-11-13 ENCOUNTER — HOSPITAL ENCOUNTER (OUTPATIENT)
Dept: PHYSICAL THERAPY | Facility: MEDICAL CENTER | Age: 42
Discharge: HOME | End: 2017-11-14
Admitting: ORTHOPAEDIC SURGERY

## 2017-11-15 ENCOUNTER — HOSPITAL ENCOUNTER (OUTPATIENT)
Dept: PHYSICAL THERAPY | Facility: MEDICAL CENTER | Age: 42
Discharge: HOME | End: 2017-11-16
Admitting: ORTHOPAEDIC SURGERY

## 2017-11-22 ENCOUNTER — OFFICE VISIT (OUTPATIENT)
Dept: ORTHOPEDIC SURGERY | Age: 42
End: 2017-11-22

## 2017-11-22 DIAGNOSIS — S83.241D TEAR OF MEDIAL MENISCUS OF RIGHT KNEE, CURRENT, UNSPECIFIED TEAR TYPE, SUBSEQUENT ENCOUNTER: Primary | ICD-10-CM

## 2017-11-22 PROCEDURE — 99024 POSTOP FOLLOW-UP VISIT: CPT | Performed by: ORTHOPAEDIC SURGERY

## 2017-11-22 NOTE — PROGRESS NOTES
Diagnosis   Name Primary?  Tear of medial meniscus of right knee, current, unspecified tear type, subsequent encounter Yes       Office Procedures:  No orders of the defined types were placed in this encounter. Treatment Plan:She is doing well at this time. I would like her to continue on with physical therapy for continued strengthening and range of motion. I would like her to hold off any high impact activities over the next 6 weeks while she continues to work on progressing her endurance with walking and continued strengthening.   I'll see her back in 6 weeks for follow-up

## 2017-12-01 ENCOUNTER — HOSPITAL ENCOUNTER (OUTPATIENT)
Dept: OTHER | Age: 42
Discharge: HOME | End: 2017-12-31
Attending: ORTHOPAEDIC SURGERY | Admitting: ORTHOPAEDIC SURGERY

## 2017-12-05 ENCOUNTER — HOSPITAL ENCOUNTER (OUTPATIENT)
Dept: PHYSICAL THERAPY | Facility: MEDICAL CENTER | Age: 42
Discharge: HOME | End: 2017-12-06
Admitting: ORTHOPAEDIC SURGERY

## 2018-01-01 ENCOUNTER — HOSPITAL ENCOUNTER (OUTPATIENT)
Dept: OTHER | Age: 43
Discharge: HOME | End: 2018-01-31
Attending: ORTHOPAEDIC SURGERY | Admitting: ORTHOPAEDIC SURGERY

## 2018-01-17 ENCOUNTER — OFFICE VISIT (OUTPATIENT)
Dept: ORTHOPEDIC SURGERY | Age: 43
End: 2018-01-17

## 2018-01-17 VITALS
SYSTOLIC BLOOD PRESSURE: 128 MMHG | WEIGHT: 188.05 LBS | BODY MASS INDEX: 36.92 KG/M2 | DIASTOLIC BLOOD PRESSURE: 72 MMHG | HEIGHT: 60 IN

## 2018-01-17 DIAGNOSIS — S83.241D TEAR OF MEDIAL MENISCUS OF RIGHT KNEE, CURRENT, UNSPECIFIED TEAR TYPE, SUBSEQUENT ENCOUNTER: Primary | ICD-10-CM

## 2018-01-17 PROCEDURE — G8427 DOCREV CUR MEDS BY ELIG CLIN: HCPCS | Performed by: ORTHOPAEDIC SURGERY

## 2018-01-17 PROCEDURE — G8484 FLU IMMUNIZE NO ADMIN: HCPCS | Performed by: ORTHOPAEDIC SURGERY

## 2018-01-17 PROCEDURE — 1036F TOBACCO NON-USER: CPT | Performed by: ORTHOPAEDIC SURGERY

## 2018-01-17 PROCEDURE — 99213 OFFICE O/P EST LOW 20 MIN: CPT | Performed by: ORTHOPAEDIC SURGERY

## 2018-01-17 PROCEDURE — G8417 CALC BMI ABV UP PARAM F/U: HCPCS | Performed by: ORTHOPAEDIC SURGERY
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
## 2018-03-20 ENCOUNTER — OFFICE VISIT (OUTPATIENT)
Dept: ORTHOPEDIC SURGERY | Age: 43
End: 2018-03-20

## 2018-03-20 VITALS
HEIGHT: 60 IN | WEIGHT: 181 LBS | HEART RATE: 80 BPM | SYSTOLIC BLOOD PRESSURE: 138 MMHG | DIASTOLIC BLOOD PRESSURE: 89 MMHG | BODY MASS INDEX: 35.53 KG/M2

## 2018-03-20 DIAGNOSIS — M67.911 ROTATOR CUFF DISORDER, RIGHT: ICD-10-CM

## 2018-03-20 DIAGNOSIS — M25.511 ACUTE PAIN OF RIGHT SHOULDER: Primary | ICD-10-CM

## 2018-03-20 PROCEDURE — G8417 CALC BMI ABV UP PARAM F/U: HCPCS | Performed by: PHYSICIAN ASSISTANT

## 2018-03-20 PROCEDURE — L3660 SO 8 AB RSTR CAN/WEB PRE OTS: HCPCS | Performed by: PHYSICIAN ASSISTANT

## 2018-03-20 PROCEDURE — 1036F TOBACCO NON-USER: CPT | Performed by: PHYSICIAN ASSISTANT

## 2018-03-20 PROCEDURE — 99214 OFFICE O/P EST MOD 30 MIN: CPT | Performed by: PHYSICIAN ASSISTANT

## 2018-03-20 PROCEDURE — G8484 FLU IMMUNIZE NO ADMIN: HCPCS | Performed by: PHYSICIAN ASSISTANT

## 2018-03-20 PROCEDURE — G8427 DOCREV CUR MEDS BY ELIG CLIN: HCPCS | Performed by: PHYSICIAN ASSISTANT

## 2018-03-20 NOTE — LETTER
[address] [place] [address],[address] [address]  Phone: [phone]  Fax: [phone]    Sage Nicole        March 27, 2018     Misael Ferguson, [address] [place] [id number]    Patient: Sonia Hood  MR Number: [id number]  YOB: 1975  Date of Visit: 3/20/2018    Dear Dr. Misael Ferguson: Thank you for the request for consultation for Lanny Porter to me for the evaluation of   Encounter Diagnoses   Name Primary?  Acute pain of right shoulder Yes    Rotator cuff disorder, right      . Below are the relevant portions of my assessment and plan of care. This dictation was done with Dragon dictation and may contain mechanical errors related to translation. The review of systems was currently provided by the patient and reviewed with the medical assistant at today's visit. Please see media. Subjective:  Sonia Hood is a 36 y.o. who is here and after hours clinic to be seen for right shoulder pain she is an established patient of Dr. Samantha Greco. He had performed a meniscal repair on her right knee in August 2016 in January of this year she fell getting out of bed and landed on her right shoulder she's had off and on pain since then has try to do physical training continues to be more and more sore as her arm is now becoming stiff she comes here for evaluation hope and a follow-up with Dr. Samantha Greco afterwards        Patient Active Problem List   Diagnosis    Dextrocardia    Diabetes type 2, uncontrolled ([address]    Non compliance w medication regimen    Chest pain    Obesity (BMI 30-39. 9)    Hyperglycemia due to type 2 diabetes mellitus (HCC)    Tear of medial meniscus of right knee, current           Current Outpatient Prescriptions on File Prior to Visit   Medication Sig Dispense Refill    acetaminophen (APAP EXTRA STRENGTH) 500 MG tablet Take 2 tablets by Regards to her shoulder she does have a positive crossover positive impingement sign she is limited with pain to about 170° of forward flexion and about 100° of abduction she is negative for a Spurling's test she has good scapular motion without winging. She has good  strength good wrist extension strength full range of motion through her elbow at 0-155. She is negative for sulcus sign negative for relocation test  Neuro exam grossly intact both lower extremities. Intact sensation to light touch. Motor exam 4+ to 5/5 in all major motor groups. Negative Hylton's sign. Skin is warm, dry and intact with out erythema or significant increased temperature around the knee joint(s). There are no cutaneous lesions or lymphadenopathy present. X-RAYS:  X-rays taken the office today were compared to previous x-rays and it looks like she now has some calcific tendinitis along with a fairly significant acromial impingement there is no significant superior migration of the humeral head there is no fractures or significant bone abnormalities there are some degenerative changes seen in the acromioclavicular joint      Assessment:  Right shoulder rotator cuff impingement/calcific tendinitis    Plan:  During today's visit, there was approximately 20 minutes of face-to-face discussion in regards to the patient's current condition and treatment options. More than 50 % of the time was counseling and coordination of care.       We talked about the importance of maintaining some passive range of motion so as not to develop adhesive capsulitis based on her disability of pain being a 7 out of 10 and the soreness in the holding her arm and I will place her in a sling for comfort but the x-rays the disability the injury and knee exam are all consistent with an injury to the rotator cuff and I think it's indicated to to get an MRI to rule out a rotator cuff tear  PROCEDURE NOTE:   order MRI to rule out rotator cuff tear

## 2018-03-21 ENCOUNTER — TELEPHONE (OUTPATIENT)
Dept: ORTHOPEDIC SURGERY | Age: 43
End: 2018-03-21

## 2018-03-21 NOTE — PROGRESS NOTES
taking differently: Inject 45 Units into the skin nightly TAKES PRN ONLY, STARTED ON VICTOZA) 1 vial 3    fluconazole (DIFLUCAN) 150 MG tablet every 7 days       ibuprofen (ADVIL;MOTRIN) 800 MG tablet 800 mg every 6 hours as needed (pain)       atorvastatin (LIPITOR) 10 MG tablet Take 40 mg by mouth daily       glipiZIDE (GLUCOTROL) 10 MG tablet Take 5 mg by mouth daily.  Liraglutide (VICTOZA SC) Inject 1.2 mcg into the skin daily       aspirin 81 MG EC tablet Take 81 mg by mouth daily       FreeStyle Lancets MISC by Does not apply route. 30 each 0    glucose blood VI test strips (EXACTECH TEST) strip As needed. 30 strip 0     No current facility-administered medications on file prior to visit. Objective:   Blood pressure 138/89, pulse 80, height 5' (1.524 m), weight 181 lb (82.1 kg), not currently breastfeeding. On examination is a very pleasant 39-year-old female no acute distress she is alert and oriented ×3 she walks without antalgia has full extension through both legs she has well-healed incisions. Some tenderness over the anterior tibia where the Endo button was placed we talked about her symptoms and I'm going to prescribe some Voltaren gel to help with the inflammation. Regards to her shoulder she does have a positive crossover positive impingement sign she is limited with pain to about 170° of forward flexion and about 100° of abduction she is negative for a Spurling's test she has good scapular motion without winging. She has good  strength good wrist extension strength full range of motion through her elbow at 0-155. She is negative for sulcus sign negative for relocation test  Neuro exam grossly intact both lower extremities. Intact sensation to light touch. Motor exam 4+ to 5/5 in all major motor groups. Negative Hylton's sign. Skin is warm, dry and intact with out erythema or significant increased temperature around the knee joint(s).      There are no cutaneous lesions or lymphadenopathy present. X-RAYS:  X-rays taken the office today of the right shoulder, AP, axillary, and Y view, were compared to previous x-rays and it looks like she now has some calcific tendinitis along with a fairly significant acromial impingement there is no significant superior migration of the humeral head there is no fractures or significant bone abnormalities there are some degenerative changes seen in the acromioclavicular joint      Assessment:  Right shoulder rotator cuff impingement/calcific tendinitis    Plan:  During today's visit, there was approximately 20 minutes of face-to-face discussion in regards to the patient's current condition and treatment options. More than 50 % of the time was counseling and coordination of care.       We talked about the importance of maintaining some passive range of motion so as not to develop adhesive capsulitis based on her disability of pain being a 7 out of 10 and the soreness in the holding her arm and I will place her in a sling for comfort but the x-rays the disability the injury and knee exam are all consistent with an injury to the rotator cuff and I think it's indicated to to get an MRI to rule out a rotator cuff tear  PROCEDURE NOTE:   order MRI to rule out rotator cuff tear      They will schedule a follow up in after MRI

## 2018-04-03 ENCOUNTER — HOSPITAL ENCOUNTER (OUTPATIENT)
Dept: MRI IMAGING | Age: 43
Discharge: HOME | End: 2018-04-03
Attending: PHYSICIAN ASSISTANT | Admitting: PHYSICIAN ASSISTANT

## 2018-04-03 DIAGNOSIS — M67.911 ROTATOR CUFF DISORDER, RIGHT: ICD-10-CM

## 2018-04-03 DIAGNOSIS — M67.911 UNSPECIFIED DISORDER OF SYNOVIUM AND TENDON, RIGHT SHOULDER: ICD-10-CM

## 2018-04-10 ENCOUNTER — HOSPITAL ENCOUNTER (OUTPATIENT)
Dept: DIABETES SERVICES | Age: 43
Discharge: HOME | End: 2018-04-30
Attending: FAMILY MEDICINE | Admitting: FAMILY MEDICINE

## 2018-04-10 DIAGNOSIS — E11.9 TYPE 2 DIABETES MELLITUS WITHOUT COMPLICATIONS (HCC): ICD-10-CM

## 2018-04-10 ASSESSMENT — PATIENT HEALTH QUESTIONNAIRE - PHQ9
SUM OF ALL RESPONSES TO PHQ QUESTIONS 1-9: 0
1. LITTLE INTEREST OR PLEASURE IN DOING THINGS: 0
2. FEELING DOWN, DEPRESSED OR HOPELESS: 0
SUM OF ALL RESPONSES TO PHQ9 QUESTIONS 1 & 2: 0

## 2018-04-11 ENCOUNTER — OFFICE VISIT (OUTPATIENT)
Dept: ORTHOPEDIC SURGERY | Age: 43
End: 2018-04-11

## 2018-04-11 VITALS — BODY MASS INDEX: 34.55 KG/M2 | WEIGHT: 176 LBS | HEIGHT: 60 IN

## 2018-04-11 DIAGNOSIS — M75.41 IMPINGEMENT SYNDROME OF RIGHT SHOULDER: Primary | ICD-10-CM

## 2018-04-11 PROCEDURE — 20610 DRAIN/INJ JOINT/BURSA W/O US: CPT | Performed by: ORTHOPAEDIC SURGERY

## 2018-04-11 PROCEDURE — G8417 CALC BMI ABV UP PARAM F/U: HCPCS | Performed by: ORTHOPAEDIC SURGERY

## 2018-04-11 PROCEDURE — G8427 DOCREV CUR MEDS BY ELIG CLIN: HCPCS | Performed by: ORTHOPAEDIC SURGERY

## 2018-04-11 PROCEDURE — 99214 OFFICE O/P EST MOD 30 MIN: CPT | Performed by: ORTHOPAEDIC SURGERY

## 2018-04-11 PROCEDURE — 1036F TOBACCO NON-USER: CPT | Performed by: ORTHOPAEDIC SURGERY

## 2018-04-11 RX ORDER — CYCLOBENZAPRINE HCL 10 MG
10 TABLET ORAL 3 TIMES DAILY PRN
Qty: 30 TABLET | Refills: 0 | Status: SHIPPED | OUTPATIENT
Start: 2018-04-11 | End: 2018-04-21

## 2018-04-23 ENCOUNTER — HOSPITAL ENCOUNTER (OUTPATIENT)
Dept: PHYSICAL THERAPY | Age: 43
Discharge: HOME | End: 2018-04-30
Admitting: ORTHOPAEDIC SURGERY

## 2018-04-25 ENCOUNTER — HOSPITAL ENCOUNTER (OUTPATIENT)
Dept: PHYSICAL THERAPY | Age: 43
Discharge: HOME | End: 2018-04-26
Admitting: ORTHOPAEDIC SURGERY

## 2018-04-25 NOTE — FLOWSHEET NOTE
eval    Treatment/Activity Tolerance:  [x] Patient tolerated treatment well [] Patient limited by fatique  [] Patient limited by pain  [] Patient limited by other medical complications  [] Other:     Prognosis: [x] Good [] Fair  [] Poor    Patient Requires Follow-up: [x] Yes  [] No    PLAN: See eval  [x] Continue per plan of care [] Alter current plan (see comments)  [x] Plan of care initiated [] Hold pending MD visit [] Discharge    *If patient does not return for further follow ups after this date. Please consider this as the patients discharge from physical therapy.      Electronically signed by: Victor Hugo Roberts , Mary Roberts 1

## 2018-04-30 ENCOUNTER — HOSPITAL ENCOUNTER (OUTPATIENT)
Dept: PHYSICAL THERAPY | Age: 43
Discharge: HOME | End: 2018-05-01
Admitting: ORTHOPAEDIC SURGERY

## 2018-04-30 NOTE — FLOWSHEET NOTE
tissue swelling/inflammation/restriction, improving soft tissue extensibility and allowing for proper ROM for normal function with self care, reaching, carrying, lifting, house/yardwork, driving/computer work    Modalities:  CP x15'    Charges:  Timed Code Treatment Minutes: 30   Total Treatment Minutes: 45     [] EVAL (LOW) 77130 (typically 20 minutes face-to-face)  [] EVAL (MOD) 42342 (typically 30 minutes face-to-face)  [] EVAL (HIGH) 42167 (typically 45 minutes face-to-face)  [] RE-EVAL     [x] OB(00180) x  1   [] IONTO  [] NMR (42548) x      [] VASO  [x] Manual (42926) x  1    [] Other:  [] TA x       [] Mech Traction (77544)  [] ES(attended) (24612)      [] ES (un) (79421):     GOALS:  1. Independent in HEP and progression per patient tolerance, in order to prevent re-injury. 2. Patient will have a decrease in pain to facilitate improvement in movement, function, and ADLs as indicated by Functional Deficits.     Long Term Goals: To be achieved in: 6 weeks  1. Disability index score of 25% or less for the [employer] to assist with reaching prior level of function. 2. Patient will demonstrate increased AROM to 150 flex and abd, C7 ER and L5 IR to allow for proper joint functioning as indicated by patients Functional Deficits. 3. Patient will demonstrate an increase in Strength to 4+/5 right shoulder to allow for proper functional mobility as indicated by patients Functional Deficits. 4. Patient will return to all ADL reaching without increased symptoms or restriction. 5. Patient will be able to lift 5 pounds overhead without increased symptoms or restriction. Progression Towards Functional goals:  [] Patient is progressing as expected towards functional goals listed. [] Progression is slowed due to complexities listed. [] Progression has been slowed due to co-morbidities.   [x] Plan just implemented, too soon to assess goals progression  [] Other:     ASSESSMENT:  Guarded with

## 2018-05-01 ENCOUNTER — HOSPITAL ENCOUNTER (OUTPATIENT)
Dept: OTHER | Age: 43
Discharge: HOME | End: 2018-05-31
Attending: FAMILY MEDICINE | Admitting: FAMILY MEDICINE

## 2018-05-01 ENCOUNTER — HOSPITAL ENCOUNTER (OUTPATIENT)
Dept: PHYSICAL THERAPY | Age: 43
Discharge: HOME | End: 2018-05-31
Attending: ORTHOPAEDIC SURGERY | Admitting: ORTHOPAEDIC SURGERY

## 2018-05-11 ENCOUNTER — OFFICE VISIT (OUTPATIENT)
Dept: ORTHOPEDIC SURGERY | Age: 43
End: 2018-05-11

## 2018-05-11 VITALS — BODY MASS INDEX: 34.54 KG/M2 | HEIGHT: 60 IN | WEIGHT: 175.93 LBS

## 2018-05-11 DIAGNOSIS — M75.41 IMPINGEMENT SYNDROME OF RIGHT SHOULDER: Primary | ICD-10-CM

## 2018-05-11 PROCEDURE — 1036F TOBACCO NON-USER: CPT | Performed by: ORTHOPAEDIC SURGERY

## 2018-05-11 PROCEDURE — 99213 OFFICE O/P EST LOW 20 MIN: CPT | Performed by: ORTHOPAEDIC SURGERY

## 2018-05-11 PROCEDURE — G8417 CALC BMI ABV UP PARAM F/U: HCPCS | Performed by: ORTHOPAEDIC SURGERY

## 2018-05-11 PROCEDURE — G8427 DOCREV CUR MEDS BY ELIG CLIN: HCPCS | Performed by: ORTHOPAEDIC SURGERY

## 2018-05-11 RX ORDER — PREDNISONE 10 MG/1
10 TABLET ORAL DAILY
Qty: 30 TABLET | Refills: 0 | Status: SHIPPED | OUTPATIENT
Start: 2018-05-11 | End: 2020-06-22 | Stop reason: ALTCHOICE

## 2019-03-22 ENCOUNTER — APPOINTMENT (OUTPATIENT)
Dept: CT IMAGING | Age: 44
End: 2019-03-22

## 2019-03-22 ENCOUNTER — HOSPITAL ENCOUNTER (EMERGENCY)
Age: 44
Discharge: HOME | End: 2019-03-22

## 2019-03-22 ENCOUNTER — HOSPITAL ENCOUNTER (EMERGENCY)
Age: 44
Discharge: LEFT BEFORE DISCHARGE | End: 2019-03-22

## 2019-03-22 VITALS
DIASTOLIC BLOOD PRESSURE: 62 MMHG | OXYGEN SATURATION: 98 % | SYSTOLIC BLOOD PRESSURE: 148 MMHG | HEIGHT: 60 IN | RESPIRATION RATE: 16 BRPM | BODY MASS INDEX: 35.53 KG/M2 | TEMPERATURE: 98.4 F | HEART RATE: 84 BPM | WEIGHT: 181 LBS

## 2019-03-22 VITALS
SYSTOLIC BLOOD PRESSURE: 126 MMHG | TEMPERATURE: 98.2 F | OXYGEN SATURATION: 100 % | RESPIRATION RATE: 16 BRPM | HEART RATE: 76 BPM | DIASTOLIC BLOOD PRESSURE: 78 MMHG

## 2019-03-22 DIAGNOSIS — Z71.1 FEARED CONDITION NOT DEMONSTRATED: ICD-10-CM

## 2019-03-22 DIAGNOSIS — T19.2XXA VAGINAL FOREIGN BODY, INITIAL ENCOUNTER: Primary | ICD-10-CM

## 2019-03-22 DIAGNOSIS — Z71.1 FEARED CONDITION NOT DEMONSTRATED: Primary | ICD-10-CM

## 2019-03-22 LAB — HCG QUALITATIVE: NEGATIVE

## 2019-03-22 PROCEDURE — 84703 CHORIONIC GONADOTROPIN ASSAY: CPT

## 2019-03-22 PROCEDURE — 99283 EMERGENCY DEPT VISIT LOW MDM: CPT

## 2019-03-22 PROCEDURE — 72192 CT PELVIS W/O DYE: CPT

## 2019-03-22 ASSESSMENT — ENCOUNTER SYMPTOMS
RHINORRHEA: 0
VOMITING: 0
NAUSEA: 0
CONSTIPATION: 0
VOMITING: 0
DIARRHEA: 0
SORE THROAT: 0
RHINORRHEA: 0
DIARRHEA: 0
BLOOD IN STOOL: 0
ABDOMINAL PAIN: 0
NAUSEA: 0
SHORTNESS OF BREATH: 0
SHORTNESS OF BREATH: 0
CONSTIPATION: 0
ABDOMINAL PAIN: 0
SORE THROAT: 0
BLOOD IN STOOL: 0

## 2019-03-22 ASSESSMENT — PAIN SCALES - GENERAL: PAINLEVEL_OUTOF10: 4

## 2019-12-20 ENCOUNTER — APPOINTMENT (OUTPATIENT)
Dept: CT IMAGING | Age: 44
End: 2019-12-20
Payer: COMMERCIAL

## 2019-12-20 ENCOUNTER — HOSPITAL ENCOUNTER (EMERGENCY)
Age: 44
Discharge: HOME | End: 2019-12-20
Attending: EMERGENCY MEDICINE
Payer: COMMERCIAL

## 2019-12-20 VITALS
OXYGEN SATURATION: 97 % | TEMPERATURE: 97.6 F | SYSTOLIC BLOOD PRESSURE: 141 MMHG | HEART RATE: 78 BPM | DIASTOLIC BLOOD PRESSURE: 87 MMHG | WEIGHT: 173 LBS | RESPIRATION RATE: 16 BRPM | HEIGHT: 61 IN | BODY MASS INDEX: 32.66 KG/M2

## 2019-12-20 DIAGNOSIS — M54.50 LOW BACK PAIN WITHOUT SCIATICA, UNSPECIFIED BACK PAIN LATERALITY, UNSPECIFIED CHRONICITY: Primary | ICD-10-CM

## 2019-12-20 PROCEDURE — 6370000000 HC RX 637 (ALT 250 FOR IP): Performed by: EMERGENCY MEDICINE

## 2019-12-20 PROCEDURE — 72131 CT LUMBAR SPINE W/O DYE: CPT

## 2019-12-20 PROCEDURE — 99283 EMERGENCY DEPT VISIT LOW MDM: CPT

## 2019-12-20 RX ORDER — CYCLOBENZAPRINE HCL 10 MG
10 TABLET ORAL ONCE
Status: COMPLETED | OUTPATIENT
Start: 2019-12-20 | End: 2019-12-20

## 2019-12-20 RX ORDER — LIDOCAINE 4 G/G
1 PATCH TOPICAL ONCE
Status: DISCONTINUED | OUTPATIENT
Start: 2019-12-20 | End: 2019-12-20 | Stop reason: HOSPADM

## 2019-12-20 RX ORDER — ACETAMINOPHEN 500 MG
1000 TABLET ORAL ONCE
Status: COMPLETED | OUTPATIENT
Start: 2019-12-20 | End: 2019-12-20

## 2019-12-20 RX ORDER — CYCLOBENZAPRINE HCL 10 MG
10 TABLET ORAL 3 TIMES DAILY PRN
Qty: 20 TABLET | Refills: 0 | Status: SHIPPED | OUTPATIENT
Start: 2019-12-20 | End: 2019-12-27

## 2019-12-20 RX ORDER — HYDROCHLOROTHIAZIDE 25 MG/1
25 TABLET ORAL DAILY
COMMUNITY

## 2019-12-20 RX ORDER — LIDOCAINE 50 MG/G
1 PATCH TOPICAL EVERY 24 HOURS
Qty: 30 PATCH | Refills: 0 | Status: SHIPPED | OUTPATIENT
Start: 2019-12-20 | End: 2020-01-19

## 2019-12-20 RX ADMIN — ACETAMINOPHEN 1000 MG: 500 TABLET, FILM COATED ORAL at 01:36

## 2019-12-20 RX ADMIN — CYCLOBENZAPRINE 10 MG: 10 TABLET, FILM COATED ORAL at 01:36

## 2019-12-20 ASSESSMENT — PAIN SCALES - GENERAL
PAINLEVEL_OUTOF10: 8
PAINLEVEL_OUTOF10: 8

## 2020-01-31 ENCOUNTER — HOSPITAL ENCOUNTER (OUTPATIENT)
Dept: PHYSICAL THERAPY | Age: 45
Setting detail: THERAPIES SERIES
Discharge: HOME | End: 2020-01-31
Payer: COMMERCIAL

## 2020-01-31 NOTE — PROGRESS NOTES
Physical Therapy  Cancellation/No-show Note  Patient Name:  Cassandra Cornelius  :  1975   Date:  2020  Cancelled visits to date: 1 for PT eval  No-shows to date: 1 for PT eval     Patient status for today's appointment patient:  [x]  Cancelled  for PT eval  []  Rescheduled appointment  []  No-show  for PT eval  Reason given by patient:  []  Patient ill  []  Conflicting appointment  []  No transportation    []  Conflict with work  []  No reason given  [x]  Other:  Pt called to cancel appt - her daughter in law had gone into labor   Comments:      Phone call information:   []  Phone call made today to patient at _ time at number provided:      []  Patient answered, conversation as follows:    []  Patient did not answer, message left as follows:  [x]  Phone call not made today    Electronically signed by:  Cass Juárez, [facility], DPT [id number]

## 2020-03-11 ENCOUNTER — OFFICE VISIT (OUTPATIENT)
Dept: ORTHOPEDIC SURGERY | Age: 45
End: 2020-03-11
Payer: COMMERCIAL

## 2020-03-11 VITALS — HEIGHT: 61 IN | BODY MASS INDEX: 33.04 KG/M2 | WEIGHT: 175 LBS

## 2020-03-11 PROCEDURE — G8484 FLU IMMUNIZE NO ADMIN: HCPCS | Performed by: ORTHOPAEDIC SURGERY

## 2020-03-11 PROCEDURE — G8417 CALC BMI ABV UP PARAM F/U: HCPCS | Performed by: ORTHOPAEDIC SURGERY

## 2020-03-11 PROCEDURE — 99214 OFFICE O/P EST MOD 30 MIN: CPT | Performed by: ORTHOPAEDIC SURGERY

## 2020-03-11 PROCEDURE — G8427 DOCREV CUR MEDS BY ELIG CLIN: HCPCS | Performed by: ORTHOPAEDIC SURGERY

## 2020-03-11 PROCEDURE — 1036F TOBACCO NON-USER: CPT | Performed by: ORTHOPAEDIC SURGERY

## 2020-03-11 NOTE — PROGRESS NOTES
Chief Complaint    Shoulder Pain (right shoulder)      History of Present Illness:  Clarke Mccray is a 38 y.o. female. She is right-hand dominant. She is here for evaluation of her right shoulder. She has had right shoulder pain that is been going on now over the last few months. We did treat her about 2 years ago for rotator cuff tendinitis and at that time she had a developing tear of the rotator cuff. She denies any new injury to the shoulder but she recently did get a job working at [employer] where she does have to do a lot more lifting she has had some increasing pain within the right shoulder over the last several months. She has pain with any type of elevation of the shoulder especially above the horizontal level. Pain is all over the lateral deltoid area. She denies radicular pain. Denies numbness or tingling. She was treated with extensive physical therapy 2 years ago and has resume back her normal therapy exercises. Does not feel like she is getting any improvement       Medical History:  Patient's medications, allergies, past medical, surgical, social and family histories were reviewed and updated as appropriate. Review of Systems:  Pertinent items are noted in HPI  Review of systems reviewed from Patient History Form dated on 3/11/20 and available in the patient's chart under the Media tab. Vital Signs:  Ht 5' 1\" (1.549 m)   Wt 175 lb (79.4 kg)   BMI 33.07 kg/m²     General Exam:   Constitutional: Patient is adequately groomed with no evidence of malnutrition  DTRs: Deep tendon reflexes are intact  Mental Status: The patient is oriented to time, place and person. The patient's mood and affect are appropriate. Lymphatic: The lymphatic examination bilaterally reveals all areas to be without enlargement or induration.     Shoulder Examination:    Inspection: No significant swelling erythema noted about the right shoulder today    Palpation: There is tenderness palpation over the bicipital groove. No significant tenderness over the [facility] joint    Range of Motion: Active forward elevation is 95 degrees. Passively we can get her to 150 degrees. External rotation is 70 degrees    Strength: She does demonstrate significant weakness with isolated supraspinatus testing. Strength is 4+ out of 5 with resisted external and internal rotation    Special Tests: She has a positive drop arm exam.  Positive Neer and Ferguson impingement test.  Pain weakness with [place]'s active compression testing. Negative belly press test    Skin: There are no rashes, ulcerations or lesions. Gait: Normal      Additional Comments:       Additional Examinations:         Left Upper Extremity: Examination of the left upper extremity does not show any tenderness, deformity or injury. Range of motion is unremarkable. There is no gross instability. There are no rashes, ulcerations or lesions. Strength and tone are normal.  Thoracic Spine: Examination of the thoracic spine does not show any tenderness, deformity or injury. Range of motion is unremarkable. There is no gross instability. There are no rashes, ulcerations or lesions. Strength and tone are normal.  Neck: Examination of the neck does not show any tenderness, deformity or injury. Range of motion is unremarkable. There is no gross instability. There are no rashes, ulcerations or lesions. Strength and tone are normal.    Radiology:     X-rays obtained and reviewed in office:  Views 4 views of the right shoulder demonstrates no obvious fracture or dislocation. AP view does demonstrate a lateral downsloping acromion. Assessment : Right shoulder concern for rotator cuff tear    Impression:  Encounter Diagnoses   Name Primary?     Right shoulder pain, unspecified chronicity Yes    Strain of right rotator cuff capsule, initial encounter        Office Procedures:  Orders Placed This Encounter   Procedures    XR SHOULDER RIGHT (MIN 2 VIEWS)     Standing Status:

## 2020-03-31 ENCOUNTER — VIRTUAL VISIT (OUTPATIENT)
Dept: ORTHOPEDIC SURGERY | Age: 45
End: 2020-03-31
Payer: COMMERCIAL

## 2020-03-31 PROCEDURE — 99442 PR PHYS/QHP TELEPHONE EVALUATION 11-20 MIN: CPT | Performed by: ORTHOPAEDIC SURGERY

## 2020-03-31 RX ORDER — MELOXICAM 15 MG/1
15 TABLET ORAL DAILY
Qty: 30 TABLET | Refills: 3 | Status: SHIPPED | OUTPATIENT
Start: 2020-03-31

## 2020-03-31 RX ORDER — LIDOCAINE 50 MG/G
1 PATCH TOPICAL DAILY
Qty: 30 PATCH | Refills: 0 | Status: SHIPPED | OUTPATIENT
Start: 2020-03-31 | End: 2020-04-30

## 2020-03-31 NOTE — PROGRESS NOTES
Consults  Patient Location:  [address] [facility]    Provider Location ([facility]/State): [place] office [place]    I did have a phone conversation with the patient today at [phone] to discuss her right shoulder. She did have an MRI of her right shoulder done that did demonstrate a partial-thickness tear at the insertion of the supraspinatus with a potential full-thickness pinhole perforation. She does continue to have pain within her right shoulder. She continues to have weakness within the right shoulder as well as limitations of range of motion. We discussed the results of this MRI and her options for treatment including both operative and nonoperative. She states that she does not have much pain at nighttime because she has been using some lidocaine cream that does give her some relief. She never did  the meloxicam prescription that we did send for her. She would like to try physical therapy for the shoulder. Were going to refer her to physical therapy we did discuss that I would recommend at least 6 weeks of therapy before we changed that treatment. I also did give her a new prescription today for meloxicam and did give her a prescription for some lidocaine patches which have been getting her some relief. She is understanding that if she fails to have improvement with physical therapy she would be a candidate in the future for shoulder arthroscopy with rotator cuff repair and possible patch augmentation. I am going to see her back in 6 weeks for follow-up. I did spend a total of 20 minutes on the phone with the patient and reviewing her studies for this phone call. This virtual visit was conducted via interactive/real-time audio/video.

## 2020-04-24 ENCOUNTER — TELEPHONE (OUTPATIENT)
Dept: ORTHOPEDIC SURGERY | Age: 45
End: 2020-04-24

## 2020-05-01 ENCOUNTER — OFFICE VISIT (OUTPATIENT)
Dept: ORTHOPEDIC SURGERY | Age: 45
End: 2020-05-01
Payer: COMMERCIAL

## 2020-05-01 VITALS — WEIGHT: 175.04 LBS | HEIGHT: 61 IN | BODY MASS INDEX: 33.05 KG/M2

## 2020-05-01 PROCEDURE — G8417 CALC BMI ABV UP PARAM F/U: HCPCS | Performed by: ORTHOPAEDIC SURGERY

## 2020-05-01 PROCEDURE — 1036F TOBACCO NON-USER: CPT | Performed by: ORTHOPAEDIC SURGERY

## 2020-05-01 PROCEDURE — G8427 DOCREV CUR MEDS BY ELIG CLIN: HCPCS | Performed by: ORTHOPAEDIC SURGERY

## 2020-05-01 PROCEDURE — 99213 OFFICE O/P EST LOW 20 MIN: CPT | Performed by: ORTHOPAEDIC SURGERY

## 2020-05-13 RX ORDER — CYCLOBENZAPRINE HCL 10 MG
10 TABLET ORAL 2 TIMES DAILY PRN
Qty: 20 TABLET | Refills: 0 | Status: SHIPPED | OUTPATIENT
Start: 2020-05-13 | End: 2020-05-23

## 2020-06-02 ENCOUNTER — TELEPHONE (OUTPATIENT)
Dept: ORTHOPEDIC SURGERY | Age: 45
End: 2020-06-02

## 2020-06-22 ENCOUNTER — APPOINTMENT (OUTPATIENT)
Dept: GENERAL RADIOLOGY | Age: 45
End: 2020-06-22
Payer: COMMERCIAL

## 2020-06-22 ENCOUNTER — HOSPITAL ENCOUNTER (OUTPATIENT)
Age: 45
Setting detail: OBSERVATION
Discharge: HOME | End: 2020-06-23
Attending: EMERGENCY MEDICINE | Admitting: FAMILY MEDICINE
Payer: COMMERCIAL

## 2020-06-22 LAB
ANION GAP SERPL CALCULATED.3IONS-SCNC: 10 MMOL/L (ref 3–16)
BASOPHILS ABSOLUTE: 0 K/UL (ref 0–0.2)
BASOPHILS RELATIVE PERCENT: 0.7 %
BUN BLDV-MCNC: 15 MG/DL (ref 7–20)
CALCIUM SERPL-MCNC: 8.8 MG/DL (ref 8.3–10.6)
CHLORIDE BLD-SCNC: 103 MMOL/L (ref 99–110)
CO2: 23 MMOL/L (ref 21–32)
CREAT SERPL-MCNC: <0.5 MG/DL (ref 0.6–1.1)
EKG ATRIAL RATE: 60 BPM
EKG DIAGNOSIS: NORMAL
EKG P-R INTERVAL: 184 MS
EKG Q-T INTERVAL: 444 MS
EKG QRS DURATION: 78 MS
EKG QTC CALCULATION (BAZETT): 444 MS
EKG R AXIS: 189 DEGREES
EKG T AXIS: 94 DEGREES
EKG VENTRICULAR RATE: 60 BPM
EOSINOPHILS ABSOLUTE: 0.1 K/UL (ref 0–0.6)
EOSINOPHILS RELATIVE PERCENT: 1.2 %
GFR AFRICAN AMERICAN: >60
GFR NON-AFRICAN AMERICAN: >60
GLUCOSE BLD-MCNC: 247 MG/DL (ref 70–99)
GLUCOSE BLD-MCNC: 318 MG/DL (ref 70–99)
HCG QUALITATIVE: NEGATIVE
HCT VFR BLD CALC: 40.9 % (ref 36–48)
HEMOGLOBIN: 14 G/DL (ref 12–16)
LYMPHOCYTES ABSOLUTE: 1.8 K/UL (ref 1–5.1)
LYMPHOCYTES RELATIVE PERCENT: 35.5 %
MCH RBC QN AUTO: 31.3 PG (ref 26–34)
MCHC RBC AUTO-ENTMCNC: 34.3 G/DL (ref 31–36)
MCV RBC AUTO: 91.4 FL (ref 80–100)
MONOCYTES ABSOLUTE: 0.3 K/UL (ref 0–1.3)
MONOCYTES RELATIVE PERCENT: 5.1 %
NEUTROPHILS ABSOLUTE: 2.9 K/UL (ref 1.7–7.7)
NEUTROPHILS RELATIVE PERCENT: 57.5 %
PDW BLD-RTO: 13.5 % (ref 12.4–15.4)
PERFORMED ON: ABNORMAL
PLATELET # BLD: 227 K/UL (ref 135–450)
PMV BLD AUTO: 8 FL (ref 5–10.5)
POTASSIUM SERPL-SCNC: 3.6 MMOL/L (ref 3.5–5.1)
PRO-BNP: 10 PG/ML (ref 0–124)
RBC # BLD: 4.48 M/UL (ref 4–5.2)
SODIUM BLD-SCNC: 136 MMOL/L (ref 136–145)
TROPONIN: <0.01 NG/ML
WBC # BLD: 5 K/UL (ref 4–11)

## 2020-06-22 PROCEDURE — 96374 THER/PROPH/DIAG INJ IV PUSH: CPT

## 2020-06-22 PROCEDURE — 85025 COMPLETE CBC W/AUTO DIFF WBC: CPT

## 2020-06-22 PROCEDURE — 83036 HEMOGLOBIN GLYCOSYLATED A1C: CPT

## 2020-06-22 PROCEDURE — 80048 BASIC METABOLIC PNL TOTAL CA: CPT

## 2020-06-22 PROCEDURE — 6370000000 HC RX 637 (ALT 250 FOR IP): Performed by: FAMILY MEDICINE

## 2020-06-22 PROCEDURE — 2580000003 HC RX 258: Performed by: FAMILY MEDICINE

## 2020-06-22 PROCEDURE — 84703 CHORIONIC GONADOTROPIN ASSAY: CPT

## 2020-06-22 PROCEDURE — 93005 ELECTROCARDIOGRAM TRACING: CPT | Performed by: EMERGENCY MEDICINE

## 2020-06-22 PROCEDURE — G0378 HOSPITAL OBSERVATION PER HR: HCPCS

## 2020-06-22 PROCEDURE — 6360000002 HC RX W HCPCS: Performed by: FAMILY MEDICINE

## 2020-06-22 PROCEDURE — 36415 COLL VENOUS BLD VENIPUNCTURE: CPT

## 2020-06-22 PROCEDURE — 84484 ASSAY OF TROPONIN QUANT: CPT

## 2020-06-22 PROCEDURE — 93010 ELECTROCARDIOGRAM REPORT: CPT | Performed by: INTERNAL MEDICINE

## 2020-06-22 PROCEDURE — 6360000002 HC RX W HCPCS: Performed by: NURSE PRACTITIONER

## 2020-06-22 PROCEDURE — 83880 ASSAY OF NATRIURETIC PEPTIDE: CPT

## 2020-06-22 PROCEDURE — 71046 X-RAY EXAM CHEST 2 VIEWS: CPT

## 2020-06-22 PROCEDURE — 99285 EMERGENCY DEPT VISIT HI MDM: CPT

## 2020-06-22 PROCEDURE — 96372 THER/PROPH/DIAG INJ SC/IM: CPT

## 2020-06-22 RX ORDER — HYDROCHLOROTHIAZIDE 25 MG/1
25 TABLET ORAL DAILY
Status: DISCONTINUED | OUTPATIENT
Start: 2020-06-22 | End: 2020-06-23 | Stop reason: HOSPADM

## 2020-06-22 RX ORDER — DEXTROSE MONOHYDRATE 25 G/50ML
12.5 INJECTION, SOLUTION INTRAVENOUS PRN
Status: DISCONTINUED | OUTPATIENT
Start: 2020-06-22 | End: 2020-06-22 | Stop reason: SDUPTHER

## 2020-06-22 RX ORDER — NICOTINE POLACRILEX 4 MG
15 LOZENGE BUCCAL PRN
Status: DISCONTINUED | OUTPATIENT
Start: 2020-06-22 | End: 2020-06-23 | Stop reason: HOSPADM

## 2020-06-22 RX ORDER — ATORVASTATIN CALCIUM 40 MG/1
40 TABLET, FILM COATED ORAL NIGHTLY
Status: DISCONTINUED | OUTPATIENT
Start: 2020-06-22 | End: 2020-06-23 | Stop reason: HOSPADM

## 2020-06-22 RX ORDER — DEXTROSE MONOHYDRATE 50 MG/ML
100 INJECTION, SOLUTION INTRAVENOUS PRN
Status: DISCONTINUED | OUTPATIENT
Start: 2020-06-22 | End: 2020-06-22 | Stop reason: SDUPTHER

## 2020-06-22 RX ORDER — AMLODIPINE BESYLATE 5 MG/1
5 TABLET ORAL DAILY
Status: DISCONTINUED | OUTPATIENT
Start: 2020-06-22 | End: 2020-06-23 | Stop reason: HOSPADM

## 2020-06-22 RX ORDER — SODIUM CHLORIDE 0.9 % (FLUSH) 0.9 %
10 SYRINGE (ML) INJECTION PRN
Status: DISCONTINUED | OUTPATIENT
Start: 2020-06-22 | End: 2020-06-23 | Stop reason: HOSPADM

## 2020-06-22 RX ORDER — DEXTROSE MONOHYDRATE 25 G/50ML
12.5 INJECTION, SOLUTION INTRAVENOUS PRN
Status: DISCONTINUED | OUTPATIENT
Start: 2020-06-22 | End: 2020-06-23 | Stop reason: HOSPADM

## 2020-06-22 RX ORDER — POLYETHYLENE GLYCOL 3350 17 G/17G
17 POWDER, FOR SOLUTION ORAL DAILY PRN
Status: DISCONTINUED | OUTPATIENT
Start: 2020-06-22 | End: 2020-06-23 | Stop reason: HOSPADM

## 2020-06-22 RX ORDER — ONDANSETRON 2 MG/ML
4 INJECTION INTRAMUSCULAR; INTRAVENOUS EVERY 6 HOURS PRN
Status: DISCONTINUED | OUTPATIENT
Start: 2020-06-22 | End: 2020-06-23 | Stop reason: HOSPADM

## 2020-06-22 RX ORDER — SODIUM CHLORIDE 0.9 % (FLUSH) 0.9 %
10 SYRINGE (ML) INJECTION EVERY 12 HOURS SCHEDULED
Status: DISCONTINUED | OUTPATIENT
Start: 2020-06-22 | End: 2020-06-23 | Stop reason: HOSPADM

## 2020-06-22 RX ORDER — NICOTINE POLACRILEX 4 MG
15 LOZENGE BUCCAL PRN
Status: DISCONTINUED | OUTPATIENT
Start: 2020-06-22 | End: 2020-06-22 | Stop reason: SDUPTHER

## 2020-06-22 RX ORDER — PROMETHAZINE HYDROCHLORIDE 25 MG/1
12.5 TABLET ORAL EVERY 6 HOURS PRN
Status: DISCONTINUED | OUTPATIENT
Start: 2020-06-22 | End: 2020-06-23 | Stop reason: HOSPADM

## 2020-06-22 RX ORDER — INSULIN GLARGINE 100 [IU]/ML
44 INJECTION, SOLUTION SUBCUTANEOUS NIGHTLY
COMMUNITY

## 2020-06-22 RX ORDER — NITROGLYCERIN 0.4 MG/1
0.4 TABLET SUBLINGUAL EVERY 5 MIN PRN
Status: DISCONTINUED | OUTPATIENT
Start: 2020-06-22 | End: 2020-06-23 | Stop reason: HOSPADM

## 2020-06-22 RX ORDER — ASPIRIN 81 MG/1
81 TABLET ORAL DAILY
Status: DISCONTINUED | OUTPATIENT
Start: 2020-06-22 | End: 2020-06-23 | Stop reason: HOSPADM

## 2020-06-22 RX ORDER — ACETAMINOPHEN 325 MG/1
650 TABLET ORAL EVERY 6 HOURS PRN
Status: DISCONTINUED | OUTPATIENT
Start: 2020-06-22 | End: 2020-06-23 | Stop reason: HOSPADM

## 2020-06-22 RX ORDER — INSULIN LISPRO 100 [IU]/ML
0-6 INJECTION, SOLUTION INTRAVENOUS; SUBCUTANEOUS NIGHTLY
Status: DISCONTINUED | OUTPATIENT
Start: 2020-06-22 | End: 2020-06-23 | Stop reason: ALTCHOICE

## 2020-06-22 RX ORDER — ACETAMINOPHEN 650 MG/1
650 SUPPOSITORY RECTAL EVERY 6 HOURS PRN
Status: DISCONTINUED | OUTPATIENT
Start: 2020-06-22 | End: 2020-06-23 | Stop reason: HOSPADM

## 2020-06-22 RX ORDER — GLIPIZIDE 5 MG/1
5 TABLET, FILM COATED, EXTENDED RELEASE ORAL DAILY
COMMUNITY

## 2020-06-22 RX ORDER — ATORVASTATIN CALCIUM 40 MG/1
40 TABLET, FILM COATED ORAL DAILY
COMMUNITY

## 2020-06-22 RX ORDER — KETOROLAC TROMETHAMINE 30 MG/ML
30 INJECTION, SOLUTION INTRAMUSCULAR; INTRAVENOUS ONCE
Status: COMPLETED | OUTPATIENT
Start: 2020-06-22 | End: 2020-06-22

## 2020-06-22 RX ORDER — INSULIN LISPRO 100 [IU]/ML
0-12 INJECTION, SOLUTION INTRAVENOUS; SUBCUTANEOUS
Status: DISCONTINUED | OUTPATIENT
Start: 2020-06-23 | End: 2020-06-23 | Stop reason: ALTCHOICE

## 2020-06-22 RX ORDER — DEXTROSE MONOHYDRATE 50 MG/ML
100 INJECTION, SOLUTION INTRAVENOUS PRN
Status: DISCONTINUED | OUTPATIENT
Start: 2020-06-22 | End: 2020-06-23 | Stop reason: HOSPADM

## 2020-06-22 RX ADMIN — ASPIRIN 81 MG: 81 TABLET, COATED ORAL at 21:04

## 2020-06-22 RX ADMIN — KETOROLAC TROMETHAMINE 30 MG: 30 INJECTION, SOLUTION INTRAMUSCULAR at 13:44

## 2020-06-22 RX ADMIN — Medication 10 ML: at 21:05

## 2020-06-22 RX ADMIN — AMLODIPINE BESYLATE 5 MG: 5 TABLET ORAL at 21:04

## 2020-06-22 RX ADMIN — DESMOPRESSIN ACETATE 40 MG: 0.2 TABLET ORAL at 21:04

## 2020-06-22 RX ADMIN — INSULIN GLARGINE 20 UNITS: 100 INJECTION, SOLUTION SUBCUTANEOUS at 21:27

## 2020-06-22 RX ADMIN — INSULIN LISPRO 4 UNITS: 100 INJECTION, SOLUTION INTRAVENOUS; SUBCUTANEOUS at 21:27

## 2020-06-22 RX ADMIN — ACETAMINOPHEN 650 MG: 325 TABLET, FILM COATED ORAL at 21:26

## 2020-06-22 RX ADMIN — ENOXAPARIN SODIUM 40 MG: 40 INJECTION SUBCUTANEOUS at 21:05

## 2020-06-22 RX ADMIN — HYDROCHLOROTHIAZIDE 25 MG: 25 TABLET ORAL at 21:04

## 2020-06-22 ASSESSMENT — ENCOUNTER SYMPTOMS
ABDOMINAL PAIN: 0
DIARRHEA: 0
BLOOD IN STOOL: 0
NAUSEA: 0
SORE THROAT: 0
CONSTIPATION: 0
SHORTNESS OF BREATH: 0
VOMITING: 0
RHINORRHEA: 0

## 2020-06-22 ASSESSMENT — PAIN DESCRIPTION - PAIN TYPE
TYPE: ACUTE PAIN

## 2020-06-22 ASSESSMENT — PAIN SCALES - GENERAL
PAINLEVEL_OUTOF10: 8
PAINLEVEL_OUTOF10: 5
PAINLEVEL_OUTOF10: 7
PAINLEVEL_OUTOF10: 8
PAINLEVEL_OUTOF10: 5
PAINLEVEL_OUTOF10: 3

## 2020-06-22 ASSESSMENT — PAIN DESCRIPTION - LOCATION
LOCATION: CHEST
LOCATION: CHEST

## 2020-06-22 ASSESSMENT — PAIN DESCRIPTION - DESCRIPTORS
DESCRIPTORS: PRESSURE
DESCRIPTORS: PRESSURE

## 2020-06-22 ASSESSMENT — HEART SCORE: ECG: 1

## 2020-06-22 ASSESSMENT — PAIN DESCRIPTION - ORIENTATION
ORIENTATION: RIGHT
ORIENTATION: RIGHT

## 2020-06-22 NOTE — ED NOTES
Pt pain improved, pt has pressure every breath she takes still, pt feels pressure on right side is from torn rotator cuff she is having surgery September 2020.  See vital signs on flow sheet, pt drowsy, side rails up x2, call light near pt, bed locked rn will notify md of pt notifying rn of torn rotator cuff and plan of surgery     Nanda Hanson RN  06/22/20 2032

## 2020-06-22 NOTE — LETTER
[facility]  [address] [id number]  Phone: [phone]          June 23, 2020     Patient: Yefri Damon   YOB: 1975   Date of Visit: 6/22/2020       To Whom It May Concern: It is my medical opinion that Enoc Mueller may return to work on 6/25. If you have any questions or concerns, please don't hesitate to call.     Sincerely,

## 2020-06-22 NOTE — ED NOTES
Pharmacy Medication History Note      List of current medications patient is taking is complete. Source of information: patient    Changes made to medication list:  Medications flagged for removal (include reason, ex. noncompliance):  N/A    Medications removed (include reason, ex. therapy complete or physician discontinued):  Glipizide- dose adjustment  Victoza- discontinued  Lantus- dose adjustment  Vitamin D- therapy complete  Hydrocodone- therapy complete    Medications added/doses adjusted:  Glipizide ER 5mg- QD    Other notes (ex. Recent course of antibiotics, Coumadin dosing):  Denies use of other OTC or herbal medications. Last dose times updated. Derek Patterson [facility]    No current facility-administered medications on file prior to encounter. Current Outpatient Medications on File Prior to Encounter   Medication Sig Dispense Refill    glipiZIDE (GLUCOTROL XL) 5 MG extended release tablet Take 5 mg by mouth daily      atorvastatin (LIPITOR) 40 MG tablet Take 40 mg by mouth daily      insulin glargine (LANTUS SOLOSTAR) 100 UNIT/ML injection pen Inject 44 Units into the skin nightly      meloxicam (MOBIC) 15 MG tablet Take 1 tablet by mouth daily 30 tablet 3    hydrochlorothiazide (HYDRODIURIL) 25 MG tablet Take 25 mg by mouth daily      amLODIPine (NORVASC) 5 MG tablet Take 5 mg by mouth daily      insulin aspart (NOVOLOG) 100 UNIT/ML injection vial Inject 15 Units into the skin 3 times daily (before meals) 1 vial 3    fluconazole (DIFLUCAN) 150 MG tablet Take 150 mg by mouth every 14 days       aspirin 81 MG EC tablet Take 81 mg by mouth daily       FreeStyle Lancets MISC by Does not apply route. 30 each 0    glucose blood VI test strips (EXACTECH TEST) strip As needed.  30 strip 0    [DISCONTINUED] predniSONE (DELTASONE) 10 MG tablet Take 1 tablet by mouth daily Days1-2:50mg PO QD,Days3-4:40mg PO QD,Days5-6:30mg PO QD,Days7-8:20mg PO QD,Days 9-10:10mg PO QD 30 tablet 0    [DISCONTINUED] diclofenac

## 2020-06-22 NOTE — CARE COORDINATION
Patient admitted as Observation with an anticipated short hospitalization length of stay. Chart reviewed and it appears that patient has minimal needs for discharge at this time. Discussed with patients nurse and requested that case management be notified if discharge needs are identified. *Case management will continue to follow progress and update discharge plan as needed.     Electronically signed by GEOVANI Young, ADEN on 6/22/2020 at 7:32 PM

## 2020-06-22 NOTE — ED NOTES
Bed: 08  Expected date:   Expected time:   Means of arrival:   Comments:  [id number], RN  06/22/20 1975

## 2020-06-22 NOTE — ED PROVIDER NOTES
[address] [facility]        Pt Name: Imelda Sommer  MRN: [id number]  [place] 1975  Date of evaluation: 6/22/2020  Provider: VANDANA Mcconnell - ANTONIA  PCP: Avery Ferrera MD    This patient was seen and evaluated by the attending physician Marcie Oneal MD.    [address] [place] [address]       Chief Complaint   Patient presents with    Chest Pain     Pt. comes in by [facility] from work with complaints of chest pain. Pt. reports pain to right side of her chest, states it started this morning around 9am and feels like someone is sitting on her chest.        HISTORY OF PRESENT ILLNESS   (Location/Symptom, Timing/Onset,Context/Setting, Quality, Duration, Modifying Factors, Severity)  Note limiting factors. Imelda Sommer is a 38 y.o. female who presents emergency department with concern for right-sided chest pain. History of dextrocardia, hypertension, hyperlipidemia, and diabetes. No history of CAD. She has tried no over-the-counter prescribed remedies for symptoms. She is not a smoker. Denies any recent travel. No swelling of her feet and ankles. She denies fever, rash, headaches, dizziness, shortness of breath, cough, congestion, abdominal pain, nausea, vomiting, diarrhea, constipation, blood in the stool, or painful urination. No family at bedside. Nursing Notes triage note reviewed and agreed with or any disagreements were addressed  in the HPI. REVIEW OF SYSTEMS    (2-9 systems for level 4, 10 or more for level 5)     Review of Systems   Constitutional: Negative for chills and fever. HENT: Negative for postnasal drip, rhinorrhea and sore throat. Eyes: Negative for visual disturbance. Respiratory: Negative for shortness of breath. Cardiovascular: Positive for chest pain. Gastrointestinal: Negative for abdominal pain, blood in stool, constipation, diarrhea, nausea and vomiting.    Genitourinary: Negative m) 177 lb (80.3 kg)       Physical Exam  Vitals signs and nursing note reviewed. Constitutional:       Appearance: She is well-developed. She is not diaphoretic. HENT:      Head: Normocephalic and atraumatic. Eyes:      General: No scleral icterus. Right eye: No discharge. Left eye: No discharge. Neck:      Musculoskeletal: Normal range of motion and neck supple. Cardiovascular:      Rate and Rhythm: Normal rate and regular rhythm. Heart sounds: Normal heart sounds. No murmur. No friction rub. No gallop. Pulmonary:      Effort: Pulmonary effort is normal. No respiratory distress. Breath sounds: Normal breath sounds. No stridor. No wheezing or rales. Chest:      Chest wall: No tenderness. Abdominal:      General: Bowel sounds are normal. There is no distension. Palpations: Abdomen is soft. There is no mass. Tenderness: There is no abdominal tenderness. There is no guarding or rebound. Musculoskeletal: Normal range of motion. General: No tenderness. Skin:     General: Skin is warm and dry. Coloration: Skin is not pale. Neurological:      Mental Status: She is alert and oriented to person, place, and time.       Coordination: Coordination normal.   Psychiatric:         Behavior: Behavior normal.         DIAGNOSTIC RESULTS   LABS:    Labs Reviewed   BASIC METABOLIC PANEL - Abnormal; Notable for the following components:       Result Value    Glucose 247 (*)     CREATININE <0.5 (*)     All other components within normal limits    Narrative:     Performed at:  [facility] [address], [place], [address] [facility]   Phone [phone]   TROPONIN    Narrative:     Performed at:  [facility] [address], [place], [address] [facility]   Phone [phone]   CBC WITH AUTO DIFFERENTIAL    Narrative:     Performed at:  [facility] [address], [place], [facility] given the following medications:  Medications   ketorolac (TORADOL) injection 30 mg (30 mg Intravenous Given 6/22/20 0481)       Martine Danielle was evaluated in the emergency department with concern for right-sided chest pain. She does have dextrocardia. Heart score is 5. Laboratory evaluation performed. She is hyperglycemic secondary to diabetes. Labs otherwise unremarkable. Chest x-ray negative for acute abnormality. She did receive aspirin prior to arrival.  Received Toradol in the ER with improvement in pain. The hospitalist was consulted and is in agreement for admission. This plan was discussed with the patient who is in agreement with the plan. FINAL IMPRESSION      1. Acute chest pain    2.  Type 2 diabetes mellitus with hyperglycemia, with long-term current use of insulin [facility])          DISPOSITION/PLAN   DISPOSITION Admitted 06/22/2020 04:38:49 PM      (Please note that portions of this note were completed with a voice recognition program.  Efforts were made to edit the dictations but occasionally words are mis-transcribed.)    VANDANA Kulkarni CNP (electronically signed)        VANDANA Kulkarni CNP  06/22/20 7911

## 2020-06-23 VITALS
HEART RATE: 65 BPM | OXYGEN SATURATION: 98 % | SYSTOLIC BLOOD PRESSURE: 133 MMHG | TEMPERATURE: 97 F | HEIGHT: 61 IN | BODY MASS INDEX: 34.1 KG/M2 | WEIGHT: 180.6 LBS | DIASTOLIC BLOOD PRESSURE: 83 MMHG | RESPIRATION RATE: 18 BRPM

## 2020-06-23 LAB
ESTIMATED AVERAGE GLUCOSE: 217.3 MG/DL
GLUCOSE BLD-MCNC: 319 MG/DL (ref 70–99)
HBA1C MFR BLD: 9.2 %
LV EF: 73 %
LVEF MODALITY: NORMAL
PERFORMED ON: ABNORMAL

## 2020-06-23 PROCEDURE — 2580000003 HC RX 258: Performed by: FAMILY MEDICINE

## 2020-06-23 PROCEDURE — 3430000000 HC RX DIAGNOSTIC RADIOPHARMACEUTICAL: Performed by: INTERNAL MEDICINE

## 2020-06-23 PROCEDURE — 94760 N-INVAS EAR/PLS OXIMETRY 1: CPT

## 2020-06-23 PROCEDURE — 78452 HT MUSCLE IMAGE SPECT MULT: CPT | Performed by: INTERNAL MEDICINE

## 2020-06-23 PROCEDURE — G0378 HOSPITAL OBSERVATION PER HR: HCPCS

## 2020-06-23 PROCEDURE — 6370000000 HC RX 637 (ALT 250 FOR IP): Performed by: INTERNAL MEDICINE

## 2020-06-23 PROCEDURE — 6370000000 HC RX 637 (ALT 250 FOR IP): Performed by: FAMILY MEDICINE

## 2020-06-23 PROCEDURE — A9502 TC99M TETROFOSMIN: HCPCS | Performed by: INTERNAL MEDICINE

## 2020-06-23 PROCEDURE — 93017 CV STRESS TEST TRACING ONLY: CPT | Performed by: INTERNAL MEDICINE

## 2020-06-23 RX ORDER — INSULIN LISPRO 100 [IU]/ML
0-9 INJECTION, SOLUTION INTRAVENOUS; SUBCUTANEOUS NIGHTLY
Status: DISCONTINUED | OUTPATIENT
Start: 2020-06-23 | End: 2020-06-23 | Stop reason: HOSPADM

## 2020-06-23 RX ORDER — INSULIN LISPRO 100 [IU]/ML
0-18 INJECTION, SOLUTION INTRAVENOUS; SUBCUTANEOUS
Status: DISCONTINUED | OUTPATIENT
Start: 2020-06-23 | End: 2020-06-23

## 2020-06-23 RX ORDER — INSULIN LISPRO 100 [IU]/ML
0-18 INJECTION, SOLUTION INTRAVENOUS; SUBCUTANEOUS
Status: DISCONTINUED | OUTPATIENT
Start: 2020-06-23 | End: 2020-06-23 | Stop reason: HOSPADM

## 2020-06-23 RX ORDER — CYCLOBENZAPRINE HCL 10 MG
10 TABLET ORAL 3 TIMES DAILY PRN
COMMUNITY

## 2020-06-23 RX ORDER — MELOXICAM 7.5 MG/1
15 TABLET ORAL DAILY
Status: DISCONTINUED | OUTPATIENT
Start: 2020-06-23 | End: 2020-06-23 | Stop reason: HOSPADM

## 2020-06-23 RX ORDER — CYCLOBENZAPRINE HCL 10 MG
10 TABLET ORAL 3 TIMES DAILY PRN
Status: DISCONTINUED | OUTPATIENT
Start: 2020-06-23 | End: 2020-06-23 | Stop reason: HOSPADM

## 2020-06-23 RX ADMIN — MELOXICAM 15 MG: 7.5 TABLET ORAL at 10:54

## 2020-06-23 RX ADMIN — TETROFOSMIN 10 MILLICURIE: 1.38 INJECTION, POWDER, LYOPHILIZED, FOR SOLUTION INTRAVENOUS at 07:26

## 2020-06-23 RX ADMIN — CYCLOBENZAPRINE 10 MG: 10 TABLET, FILM COATED ORAL at 10:55

## 2020-06-23 RX ADMIN — HYDROCHLOROTHIAZIDE 25 MG: 25 TABLET ORAL at 10:55

## 2020-06-23 RX ADMIN — AMLODIPINE BESYLATE 5 MG: 5 TABLET ORAL at 10:55

## 2020-06-23 RX ADMIN — INSULIN LISPRO 12 UNITS: 100 INJECTION, SOLUTION INTRAVENOUS; SUBCUTANEOUS at 10:55

## 2020-06-23 RX ADMIN — Medication 10 ML: at 10:55

## 2020-06-23 RX ADMIN — TETROFOSMIN 30 MILLICURIE: 1.38 INJECTION, POWDER, LYOPHILIZED, FOR SOLUTION INTRAVENOUS at 08:39

## 2020-06-23 RX ADMIN — ASPIRIN 81 MG: 81 TABLET, COATED ORAL at 10:55

## 2020-06-23 RX ADMIN — INSULIN GLARGINE 22 UNITS: 100 INJECTION, SOLUTION SUBCUTANEOUS at 10:56

## 2020-06-23 ASSESSMENT — PAIN DESCRIPTION - PAIN TYPE: TYPE: ACUTE PAIN

## 2020-06-23 ASSESSMENT — PAIN DESCRIPTION - LOCATION: LOCATION: SHOULDER

## 2020-06-23 ASSESSMENT — PAIN SCALES - GENERAL
PAINLEVEL_OUTOF10: 0
PAINLEVEL_OUTOF10: 6

## 2020-06-23 ASSESSMENT — PAIN DESCRIPTION - ORIENTATION: ORIENTATION: RIGHT

## 2020-06-23 NOTE — DISCHARGE SUMMARY
deficits. Cranial nerves: II-XII intact, grossly non-focal.  Psychiatric:  Alert and oriented  Capillary Refill: Brisk,< 3 seconds   Peripheral Pulses: +2 palpable, equal bilaterally       Labs:  For convenience and continuity at follow-up the following most recent labs are provided:      CBC:    Lab Results   Component Value Date    WBC 5.0 06/22/2020    HGB 14.0 06/22/2020    HCT 40.9 06/22/2020     06/22/2020       Renal:    Lab Results   Component Value Date     06/22/2020    K 3.6 06/22/2020     06/22/2020    CO2 23 06/22/2020    BUN 15 06/22/2020    CREATININE <0.5 06/22/2020    CALCIUM 8.8 06/22/2020    PHOS 3.2 03/17/2016         Significant Diagnostic Studies    Radiology:   NM MYOCARDIAL SPECT REST EXERCISE OR RX   Final Result      XR CHEST STANDARD (2 VW)   Final Result   No active cardiopulmonary disease                Consults:     IP CONSULT TO HOSPITALIST  IP CONSULT TO SPIRITUAL SERVICES    Disposition: Home    Condition at Discharge: Stable    Discharge Instructions/Follow-up: Primary care physician    Code Status:  Full Code     Activity: activity as tolerated    Diet: diabetic diet      Discharge Medications:     Current Discharge Medication List           Details   cyclobenzaprine (FLEXERIL) 10 MG tablet Take 10 mg by mouth 3 times daily as needed for Muscle spasms      glipiZIDE (GLUCOTROL XL) 5 MG extended release tablet Take 5 mg by mouth daily      atorvastatin (LIPITOR) 40 MG tablet Take 40 mg by mouth daily      insulin glargine (LANTUS SOLOSTAR) 100 UNIT/ML injection pen Inject 44 Units into the skin nightly      meloxicam (MOBIC) 15 MG tablet Take 1 tablet by mouth daily  Qty: 30 tablet, Refills: 3      hydrochlorothiazide (HYDRODIURIL) 25 MG tablet Take 25 mg by mouth daily      amLODIPine (NORVASC) 5 MG tablet Take 5 mg by mouth daily      insulin aspart (NOVOLOG) 100 UNIT/ML injection vial Inject 15 Units into the skin 3 times daily (before meals)  Qty: 1 vial,

## 2020-06-23 NOTE — PROGRESS NOTES
Data- discharge order received, pt verbalized agreement to discharge, disposition to previous residence, no needs for HHC/DME. Action- discharge instructions prepared and given to pt, pt verbalized understanding. Medication information packet given r/t NEW and/or CHANGED prescriptions emphasizing name/purpose/side effects, pt verbalized understanding. Discharge instruction summary: Diet- general, Activity- as william, Primary Care Physician as follows: Angelina Abrams MD None f/u appointment pt has apt scheduled for tomorrow, immunizations reviewed , prescription medications filled N/A. Response- Pt belongings gathered, IV removed. Disposition is home (no HHC/DME needs), transported with family, taken to lobby via w/c w/ discharge lounge employee, no complications.

## 2020-06-23 NOTE — PROGRESS NOTES
Shift assessment complete. VSS. Pt given Tylenol for right sided chest pain, pt declined Nitro, stating it did not help her when she took it at her workplace. Pt stated she has a right torn rotator cuff and feels it has gotten worse and more painful. Pt aware of POC, NPO after MN. Testing explained to pt. Call light within reach, up as tolerated.

## 2020-06-23 NOTE — PROGRESS NOTES
Pt returned from stress lab. C/o right shoulder pain. Has rotator cuff surgery already scheduled outpt. Currently taking muscle relaxer and mobic at home- message sent to MD regarding ordering them for here. Waiting for stress test results.

## 2020-06-23 NOTE — H&P
sodium chloride flush 0.9 % injection 10 mL  10 mL Intravenous PRN Sammy Lopez MD        acetaminophen (TYLENOL) tablet 650 mg  650 mg Oral Q6H PRN Sammy Lopez MD   650 mg at 06/22/20 2126    Or    acetaminophen (TYLENOL) suppository 650 mg  650 mg Rectal Q6H PRN Sammy Lopez MD        polyethylene glycol (GLYCOLAX) packet 17 g  17 g Oral Daily PRN Sammy Lopez MD        promethazine (PHENERGAN) tablet 12.5 mg  12.5 mg Oral Q6H PRN Sammy Lopez MD        Or    ondansetron (ZOFRAN) injection 4 mg  4 mg Intravenous Q6H PRN Sammy Lopez MD        enoxaparin (LOVENOX) injection 40 mg  40 mg Subcutaneous Nightly Sammy Lopez MD   40 mg at 06/22/20 2105    glucose (GLUTOSE) 40 % oral gel 15 g  15 g Oral PRN Sammy Lopez MD        dextrose 50 % IV solution  12.5 g Intravenous PRN Sammy Lopez MD        glucagon (rDNA) injection 1 mg  1 mg Intramuscular PRN Sammy Lopez MD        dextrose 5 % solution  100 mL/hr Intravenous PRN Sammy Lopez MD       [facility] [START ON 6/23/2020] insulin lispro (1 Unit Dial) 0-12 Units  0-12 Units Subcutaneous TID WC Danielle Verdin MD        insulin lispro (1 Unit Dial) 0-6 Units  0-6 Units Subcutaneous Nightly Sammy Lopez MD   4 Units at 06/22/20 2127    insulin glargine (LANTUS;BASAGLAR) injection pen 20 Units  20 Units Subcutaneous Nightly Sammy Lopez MD   20 Units at 06/22/20 2127       Allergies: Allergies   Allergen Reactions    Bee Venom     Penicillins Hives    Adhesive Tape Rash        Social History:   reports that she has never smoked. She has never used smokeless tobacco. She reports current alcohol use. She reports that she does not use drugs. Family History:  family history includes Diabetes in her father; High Blood Pressure in her father; Other in her mother.  , *    Physical Exam:  BP (!) 158/92   Pulse 63   Temp 97.4 °F (36.3 °C) (Temporal)   Resp 16   Ht 5' 1\" (1.549 m)   Wt 181 lb 9.6 oz (82.4 kg)   SpO2 99%   BMI 34.31 kg/m²     General

## 2020-07-08 ENCOUNTER — OFFICE VISIT (OUTPATIENT)
Dept: ORTHOPEDIC SURGERY | Age: 45
End: 2020-07-08
Payer: COMMERCIAL

## 2020-07-08 PROCEDURE — 1036F TOBACCO NON-USER: CPT | Performed by: ORTHOPAEDIC SURGERY

## 2020-07-08 PROCEDURE — G8428 CUR MEDS NOT DOCUMENT: HCPCS | Performed by: ORTHOPAEDIC SURGERY

## 2020-07-08 PROCEDURE — 99213 OFFICE O/P EST LOW 20 MIN: CPT | Performed by: ORTHOPAEDIC SURGERY

## 2020-07-08 PROCEDURE — G8417 CALC BMI ABV UP PARAM F/U: HCPCS | Performed by: ORTHOPAEDIC SURGERY

## 2020-07-08 NOTE — PROGRESS NOTES
press test     Skin: There are no rashes, ulcerations or lesions.     Gait: Normal    Additional Comments:       Additional Examinations:         Left Upper Extremity: Examination of the left upper extremity does not show any tenderness, deformity or injury. Range of motion is unremarkable. There is no gross instability. There are no rashes, ulcerations or lesions. Strength and tone are normal.      Assessment : Right shoulder high-grade partial-thickness rotator cuff tear    Impression:  Encounter Diagnosis   Name Primary?  Incomplete tear of right rotator cuff, unspecified whether traumatic Yes       Office Procedures:  No orders of the defined types were placed in this encounter. Treatment Plan: I discussed the diagnosis and treatment options with her today. She is scheduled to have rotator cuff repair in September this year. We did discuss that if her pain is getting intolerable that we can move that up. She will continue to use meloxicam as well as muscle relaxant as needed to help control pain.   I will see her back at time of right shoulder arthroscopy with arthroscopic rotator cuff repair

## 2020-07-23 ENCOUNTER — APPOINTMENT (OUTPATIENT)
Dept: GENERAL RADIOLOGY | Age: 45
End: 2020-07-23
Payer: COMMERCIAL

## 2020-07-23 ENCOUNTER — HOSPITAL ENCOUNTER (EMERGENCY)
Age: 45
Discharge: HOME | End: 2020-07-23
Attending: EMERGENCY MEDICINE
Payer: COMMERCIAL

## 2020-07-23 VITALS
HEART RATE: 64 BPM | TEMPERATURE: 97.1 F | RESPIRATION RATE: 16 BRPM | OXYGEN SATURATION: 98 % | HEIGHT: 61 IN | WEIGHT: 170 LBS | SYSTOLIC BLOOD PRESSURE: 126 MMHG | DIASTOLIC BLOOD PRESSURE: 70 MMHG | BODY MASS INDEX: 32.1 KG/M2

## 2020-07-23 PROCEDURE — 73030 X-RAY EXAM OF SHOULDER: CPT

## 2020-07-23 PROCEDURE — 6370000000 HC RX 637 (ALT 250 FOR IP): Performed by: EMERGENCY MEDICINE

## 2020-07-23 PROCEDURE — 99283 EMERGENCY DEPT VISIT LOW MDM: CPT

## 2020-07-23 RX ORDER — HYDROCODONE BITARTRATE AND ACETAMINOPHEN 7.5; 325 MG/1; MG/1
1 TABLET ORAL ONCE
Status: COMPLETED | OUTPATIENT
Start: 2020-07-23 | End: 2020-07-23

## 2020-07-23 RX ORDER — HYDROCODONE BITARTRATE AND ACETAMINOPHEN 5; 325 MG/1; MG/1
1 TABLET ORAL EVERY 6 HOURS PRN
Qty: 12 TABLET | Refills: 0 | Status: SHIPPED | OUTPATIENT
Start: 2020-07-23 | End: 2020-07-26

## 2020-07-23 RX ORDER — CYCLOBENZAPRINE HCL 10 MG
10 TABLET ORAL ONCE
Status: COMPLETED | OUTPATIENT
Start: 2020-07-23 | End: 2020-07-23

## 2020-07-23 RX ADMIN — HYDROCODONE BITARTRATE AND ACETAMINOPHEN 1 TABLET: 7.5; 325 TABLET ORAL at 06:30

## 2020-07-23 RX ADMIN — CYCLOBENZAPRINE 10 MG: 10 TABLET, FILM COATED ORAL at 06:30

## 2020-07-23 ASSESSMENT — PAIN DESCRIPTION - ORIENTATION: ORIENTATION: RIGHT

## 2020-07-23 ASSESSMENT — PAIN DESCRIPTION - DESCRIPTORS: DESCRIPTORS: SHARP;SHOOTING

## 2020-07-23 ASSESSMENT — PAIN DESCRIPTION - PAIN TYPE: TYPE: ACUTE PAIN

## 2020-07-23 ASSESSMENT — PAIN DESCRIPTION - LOCATION: LOCATION: SHOULDER

## 2020-07-23 ASSESSMENT — PAIN DESCRIPTION - FREQUENCY: FREQUENCY: CONTINUOUS

## 2020-07-23 ASSESSMENT — PAIN SCALES - GENERAL
PAINLEVEL_OUTOF10: 10
PAINLEVEL_OUTOF10: 10

## 2020-07-23 NOTE — ED PROVIDER NOTES
[address] PROVIDER NOTE    Patient Identification  Pt Name: Oran Hammans  MRN: [id number]  [place] 1975  Date of evaluation: 7/23/2020  Provider: Starla Holland MD  PCP: Toshia David MD    HPI  Oran Hammans is a 38 y.o. female who presents to the ED for right shoulder pain. The patient's pain came on around 2 or 230 this morning. It woke her from sleep. The patient has a known rotator cuff tear in her right shoulder. She plans to have surgery early next month and has been working with Dr. shelby shirley. Her pain is usually mild and tolerable. She thinks she may have returned an unusual way or rolled on it in the low at night, causing her current pain. She initially tried to treat it with ice and ibuprofen, but this did not help. She had already taken her meloxicam.  She is also prescribed muscle relaxers, but she was initially hesitant to take them because she had to work today. However, the pain is not improved, so she is come to the emergency department for further evaluation. She denies having any associated numbness, tingling, focal weakness, skin changes, or other symptoms. Pain is worse when she attempts to move the shoulder in any direction. She has no radiation of the pain to her chest.  There is some radiation to the muscles of the upper back and right side neck. There is no midline pain in the neck. The patient has had no other recent injuries. .     No other complaints, modifying factors or associated symptoms. Nursing notes reviewed. Allergies: Bee venom; Penicillins; and Adhesive tape  Past medical history:   Past Medical History:   Diagnosis Date    Arthritis     Bacterial vaginal infection     Dextrocardia     Diabetes type 2, uncontrolled ([address]     Hyperlipidemia     Hypertension     Inversus, situs     Non compliance w medication regimen     doesn't like to take medicine    Obesity (BMI 30-39. 9)      Past surgical history:   Past Surgical History:   Procedure Laterality Date     SECTION      X2    ENDOSCOPY, COLON, DIAGNOSTIC      ESOPHAGUS SURGERY      torn esophagus    KNEE ARTHROSCOPY Left 13    PARTIAL MEDIAL MENISECITMY, SYNOVECTOMY    KNEE CARTILAGE SURGERY Right 2017    Meniscus Repair    TUBAL LIGATION       Home medications:   Previous Medications    AMLODIPINE (NORVASC) 5 MG TABLET    Take 5 mg by mouth daily    ASPIRIN 81 MG EC TABLET    Take 81 mg by mouth daily     ATORVASTATIN (LIPITOR) 40 MG TABLET    Take 40 mg by mouth daily    CYCLOBENZAPRINE (FLEXERIL) 10 MG TABLET    Take 10 mg by mouth 3 times daily as needed for Muscle spasms    FLUCONAZOLE (DIFLUCAN) 150 MG TABLET    Take 150 mg by mouth every 14 days     FREESTYLE LANCETS MISC    by Does not apply route. GLIPIZIDE (GLUCOTROL XL) 5 MG EXTENDED RELEASE TABLET    Take 5 mg by mouth daily    GLUCOSE BLOOD VI TEST STRIPS (EXACTECH TEST) STRIP    As needed. HYDROCHLOROTHIAZIDE (HYDRODIURIL) 25 MG TABLET    Take 25 mg by mouth daily    INSULIN ASPART (NOVOLOG) 100 UNIT/ML INJECTION VIAL    Inject 15 Units into the skin 3 times daily (before meals)    INSULIN GLARGINE (LANTUS SOLOSTAR) 100 UNIT/ML INJECTION PEN    Inject 44 Units into the skin nightly    MELOXICAM (MOBIC) 15 MG TABLET    Take 1 tablet by mouth daily     Social history:  reports that she has never smoked. She has never used smokeless tobacco. She reports current alcohol use. She reports that she does not use drugs.   Family history:    Family History   Problem Relation Age of Onset    Other Mother         murdered    High Blood Pressure Father     Diabetes Father        REVIEW OF SYSTEMS  8 systems reviewed, pertinent positives per HPI otherwise noted to be negative    PHYSICAL EXAM  /83   Pulse 72   Temp 97.1 °F (36.2 °C) (Oral)   Resp 16   Ht 5' 1\" (1.549 m)   Wt 170 lb (77.1 kg)   SpO2 98%   BMI 32.12 kg/m²   GENERAL APPEARANCE: Awake and alert. Cooperative. No acute distress. HEAD: Normocephalic. Atraumatic. EYES: Anicteric sclera. EOM grossly intact. ENT: Mucous membranes are moist.   NECK: Cervical spine nontender to palpation. Tenderness along the right cervical paraspinal muscles  CV: Brisk capillary refill.  +2 right radial pulse. LUNGS: Breathing is unlabored. Speaking comfortably in full sentences. EXTREMITIES: MAEE. There is tenderness to the right upper trapezius extending from the neck to the right shoulder. There is tenderness around the entire right shoulder joint. There is no palpable or visible deformity. Patient has tenderness throughout the bicep, although this tenderness and pain radiates into the right shoulder. Patient has severe pain with any active or passive range of motion of the right shoulder. However, she is able to perform range of motion if asked to. No significant pain comes from abduction and external rotation. SKIN: Warm and dry. NEUROLOGICAL: Alert and oriented. Normal sensation to light touch throughout the right hand and arm. Normal right side  strength. Intact and brisk right biceps reflex. RADIOLOGY  XR SHOULDER RIGHT (MIN 2 VIEWS)   Final Result   No acute abnormality           LABS  Labs Reviewed - No data to display    ED COURSE/MDM  Patient seen and evaluated. She felt much better after treatment with hydrocodone and Flexeril. I discussed plans with Patient. I do feel patient can be safely discharged to home. Recommend follow up with orthopedist for re-evaluation. Reasons to RT ED discussed. Patient expresses understanding and is in agreement with plan.      Patient Referrals:  [facility] Emergency Department  [address] [facility]  [phone]    If symptoms worsen or new symptoms develop, As needed    Lanette Osuna, [place] [address]  [phone]    Call on 7/24/2020  to set appointment for re-evaluation      Discharge

## 2020-07-23 NOTE — LETTER
[facility] Emergency Department  [address] [address]  Phone: [phone]               July 23, 2020    Patient: Mic Flores   YOB: 1975   Date of Visit: 7/23/2020       To Whom It May Concern:    Cheikh Sky was seen and treated in our emergency department on 7/23/2020. She may return to work on 7/24/2020.       Sincerely,       Hayder Traylor MD         Signature:__________________________________

## 2020-08-21 ENCOUNTER — TELEPHONE (OUTPATIENT)
Dept: ORTHOPEDIC SURGERY | Age: 45
End: 2020-08-21

## 2020-08-21 NOTE — TELEPHONE ENCOUNTER
[id number]  [facility] Inessa Mancini  RT [facility]

## 2020-08-28 ENCOUNTER — TELEPHONE (OUTPATIENT)
Dept: ORTHOPEDIC SURGERY | Age: 45
End: 2020-08-28

## 2020-09-11 ENCOUNTER — TELEPHONE (OUTPATIENT)
Dept: ORTHOPEDIC SURGERY | Age: 45
End: 2020-09-11

## 2020-09-11 ENCOUNTER — OFFICE VISIT (OUTPATIENT)
Dept: PRIMARY CARE CLINIC | Age: 45
End: 2020-09-11
Payer: COMMERCIAL

## 2020-09-11 PROCEDURE — 99211 OFF/OP EST MAY X REQ PHY/QHP: CPT | Performed by: NURSE PRACTITIONER

## 2020-09-11 PROCEDURE — G8428 CUR MEDS NOT DOCUMENT: HCPCS | Performed by: NURSE PRACTITIONER

## 2020-09-11 PROCEDURE — G8417 CALC BMI ABV UP PARAM F/U: HCPCS | Performed by: NURSE PRACTITIONER

## 2020-09-11 NOTE — PATIENT INSTRUCTIONS

## 2020-09-11 NOTE — TELEPHONE ENCOUNTER
FAXED UPDATED APS FORM TO [address] [facility] [place] ([facility] [address]) [phone]

## 2020-09-11 NOTE — PROGRESS NOTES
Lizbet Shepherd received a viral test for COVID-19. They were educated on isolation and quarantine as appropriate. For any symptoms, they were directed to seek care from their PCP, given contact information to establish with a doctor, directed to an urgent care or the emergency room.

## 2020-09-12 LAB — SARS-COV-2, NAA: NOT DETECTED

## 2020-09-15 NOTE — PROGRESS NOTES
Name_______________________________________Printed:____________________  Date and time of surgery_9/17/20__1100__[facility]___________________Arrival Time:___0930_____________   1. The instructions given regarding when and if a patient needs to stop oral intake prior to surgery varies. Follow the specific instructions you were given                  _X__Nothing to eat or to drink after Midnight the night before. 2. Take the following pills with a small sip of water on the morning of surgery__ __AMLODIPINE, LIPITOR, TYLENOL (if needed)____                  Do not take blood pressure medications ending in pril or sartan the josé prior to surgery or the morning of surgery_   3. Aspirin, Ibuprofen, Advil, Naproxen, Vitamin E and other Anti-inflammatory products and supplements should be stopped for 5 -7days before surgery or as directed by your physician. 4. Check with your Doctor regarding stopping Plavix, Coumadin,Eliquis, Lovenox,Effient,Pradaxa,Xarelto, Fragmin or other blood thinners and follow their instructions. 5. Do not smoke, and do not drink any alcoholic beverages 24 hours prior to surgery. This includes NA Beer. Refrain from the usage of any recreational drugs. 6. You may brush your teeth and gargle the morning of surgery. DO NOT SWALLOW WATER   7. You MUST make arrangements for a responsible adult to stay on site while you are here and take you home after your surgery. You will not be allowed to leave alone or drive yourself home. It is strongly suggested someone stay with you the first 24 hrs. Your surgery will be cancelled if you do not have a ride home. 8. A parent/legal guardian must accompany a child scheduled for surgery and plan to stay at the hospital until the child is discharged. Please do not bring other children with you.    9. Please wear simple, loose fitting clothing to the hospital.  Do not bring valuables (money, credit cards, checkbooks, etc.) Do not wear any makeup (including no eye makeup) or nail polish on your fingers or toes. 10. DO NOT wear any jewelry or piercings on day of surgery. All body piercing jewelry must be removed. 11. If you have ___dentures, they will be removed before going to the OR; we will provide you a container. If you wear ___contact lenses or ___glasses, they will be removed; please bring a case for them. 12. Please see your family doctor/pediatrician for a history & physical and/or concerning medications. Bring any test results/reports from your physician's office. PCP_____Dr Smith____Phone___________H&P Appt. Date___9/11/20_____             13 If you  have a Living Will and Durable Power of  for Healthcare, please bring in a copy. 15. Notify your Surgeon if you develop any illness between now and surgery  time, cough, cold, fever, sore throat, nausea, vomiting, etc.  Please notify your surgeon if you experience dizziness, shortness of breath or blurred vision between now & the time of your surgery             15. DO NOT shave your operative site 96 hours prior to surgery. For face & neck surgery, men may use an electric razor 48 hours prior to surgery. 16. Shower the night before or morning of surgery using an antibacterial soap or as you have been instructed. 17. To provide excellent care visitors will be limited to one in the room at any given time. 18.  Please bring picture ID and insurance card. 19.  Visit our web site for additional information:  [email or web address]              20.During flu season no children under the age of 15 are permitted in the hospital for the safety of all patients.                               21. If you take a long acting insulin in the evening only  take half of your usual  dose the night  before your procedure              22. If you use a c-pap please bring DOS if staying overnight,

## 2020-09-17 ENCOUNTER — ANESTHESIA (OUTPATIENT)
Dept: OPERATING ROOM | Age: 45
End: 2020-09-17
Payer: COMMERCIAL

## 2020-09-17 ENCOUNTER — HOSPITAL ENCOUNTER (OUTPATIENT)
Age: 45
Setting detail: OUTPATIENT SURGERY
Discharge: HOME | End: 2020-09-17
Attending: ORTHOPAEDIC SURGERY | Admitting: ORTHOPAEDIC SURGERY
Payer: COMMERCIAL

## 2020-09-17 ENCOUNTER — ANESTHESIA EVENT (OUTPATIENT)
Dept: OPERATING ROOM | Age: 45
End: 2020-09-17
Payer: COMMERCIAL

## 2020-09-17 VITALS
SYSTOLIC BLOOD PRESSURE: 264 MMHG | RESPIRATION RATE: 1 BRPM | DIASTOLIC BLOOD PRESSURE: 131 MMHG | TEMPERATURE: 95.9 F | OXYGEN SATURATION: 98 %

## 2020-09-17 VITALS
RESPIRATION RATE: 16 BRPM | WEIGHT: 177 LBS | HEART RATE: 67 BPM | OXYGEN SATURATION: 98 % | SYSTOLIC BLOOD PRESSURE: 154 MMHG | BODY MASS INDEX: 33.42 KG/M2 | DIASTOLIC BLOOD PRESSURE: 90 MMHG | TEMPERATURE: 97.8 F | HEIGHT: 61 IN

## 2020-09-17 LAB
ANION GAP SERPL CALCULATED.3IONS-SCNC: 12 MMOL/L (ref 3–16)
BUN BLDV-MCNC: 10 MG/DL (ref 7–20)
CALCIUM SERPL-MCNC: 9.8 MG/DL (ref 8.3–10.6)
CHLORIDE BLD-SCNC: 95 MMOL/L (ref 99–110)
CO2: 26 MMOL/L (ref 21–32)
CREAT SERPL-MCNC: 0.6 MG/DL (ref 0.6–1.1)
GFR AFRICAN AMERICAN: >60
GFR NON-AFRICAN AMERICAN: >60
GLUCOSE BLD-MCNC: 167 MG/DL (ref 70–99)
GLUCOSE BLD-MCNC: 269 MG/DL (ref 70–99)
GLUCOSE BLD-MCNC: 290 MG/DL (ref 70–99)
HCG(URINE) PREGNANCY TEST: NEGATIVE
HCT VFR BLD CALC: 44.3 % (ref 36–48)
HEMOGLOBIN: 15.5 G/DL (ref 12–16)
MCH RBC QN AUTO: 30.6 PG (ref 26–34)
MCHC RBC AUTO-ENTMCNC: 35 G/DL (ref 31–36)
MCV RBC AUTO: 87.3 FL (ref 80–100)
PDW BLD-RTO: 12.8 % (ref 12.4–15.4)
PERFORMED ON: ABNORMAL
PERFORMED ON: ABNORMAL
PLATELET # BLD: 282 K/UL (ref 135–450)
PMV BLD AUTO: 7.9 FL (ref 5–10.5)
POTASSIUM SERPL-SCNC: 3 MMOL/L (ref 3.5–5.1)
RBC # BLD: 5.08 M/UL (ref 4–5.2)
SODIUM BLD-SCNC: 133 MMOL/L (ref 136–145)
WBC # BLD: 5.7 K/UL (ref 4–11)

## 2020-09-17 PROCEDURE — 6360000002 HC RX W HCPCS: Performed by: NURSE ANESTHETIST, CERTIFIED REGISTERED

## 2020-09-17 PROCEDURE — 2580000003 HC RX 258: Performed by: ORTHOPAEDIC SURGERY

## 2020-09-17 PROCEDURE — 2500000003 HC RX 250 WO HCPCS: Performed by: NURSE ANESTHETIST, CERTIFIED REGISTERED

## 2020-09-17 PROCEDURE — 2500000003 HC RX 250 WO HCPCS: Performed by: ANESTHESIOLOGY

## 2020-09-17 PROCEDURE — 80048 BASIC METABOLIC PNL TOTAL CA: CPT

## 2020-09-17 PROCEDURE — 64415 NJX AA&/STRD BRCH PLXS IMG: CPT | Performed by: ANESTHESIOLOGY

## 2020-09-17 PROCEDURE — 3600000014 HC SURGERY LEVEL 4 ADDTL 15MIN: Performed by: ORTHOPAEDIC SURGERY

## 2020-09-17 PROCEDURE — C1713 ANCHOR/SCREW BN/BN,TIS/BN: HCPCS | Performed by: ORTHOPAEDIC SURGERY

## 2020-09-17 PROCEDURE — 2580000003 HC RX 258: Performed by: ANESTHESIOLOGY

## 2020-09-17 PROCEDURE — 6360000002 HC RX W HCPCS: Performed by: ANESTHESIOLOGY

## 2020-09-17 PROCEDURE — 3600000004 HC SURGERY LEVEL 4 BASE: Performed by: ORTHOPAEDIC SURGERY

## 2020-09-17 PROCEDURE — 7100000011 HC PHASE II RECOVERY - ADDTL 15 MIN: Performed by: ORTHOPAEDIC SURGERY

## 2020-09-17 PROCEDURE — 2720000010 HC SURG SUPPLY STERILE: Performed by: ORTHOPAEDIC SURGERY

## 2020-09-17 PROCEDURE — 7100000000 HC PACU RECOVERY - FIRST 15 MIN: Performed by: ORTHOPAEDIC SURGERY

## 2020-09-17 PROCEDURE — 7100000001 HC PACU RECOVERY - ADDTL 15 MIN: Performed by: ORTHOPAEDIC SURGERY

## 2020-09-17 PROCEDURE — 85027 COMPLETE CBC AUTOMATED: CPT

## 2020-09-17 PROCEDURE — 6360000002 HC RX W HCPCS: Performed by: ORTHOPAEDIC SURGERY

## 2020-09-17 PROCEDURE — 6370000000 HC RX 637 (ALT 250 FOR IP): Performed by: ANESTHESIOLOGY

## 2020-09-17 PROCEDURE — L3660 SO 8 AB RSTR CAN/WEB PRE OTS: HCPCS | Performed by: ORTHOPAEDIC SURGERY

## 2020-09-17 PROCEDURE — C1762 CONN TISS, HUMAN(INC FASCIA): HCPCS | Performed by: ORTHOPAEDIC SURGERY

## 2020-09-17 PROCEDURE — 2500000003 HC RX 250 WO HCPCS: Performed by: ORTHOPAEDIC SURGERY

## 2020-09-17 PROCEDURE — 3700000001 HC ADD 15 MINUTES (ANESTHESIA): Performed by: ORTHOPAEDIC SURGERY

## 2020-09-17 PROCEDURE — 2709999900 HC NON-CHARGEABLE SUPPLY: Performed by: ORTHOPAEDIC SURGERY

## 2020-09-17 PROCEDURE — 84703 CHORIONIC GONADOTROPIN ASSAY: CPT

## 2020-09-17 PROCEDURE — 7100000010 HC PHASE II RECOVERY - FIRST 15 MIN: Performed by: ORTHOPAEDIC SURGERY

## 2020-09-17 PROCEDURE — 3700000000 HC ANESTHESIA ATTENDED CARE: Performed by: ORTHOPAEDIC SURGERY

## 2020-09-17 DEVICE — HEALICOIL RG SA 4.75MM W/2 UB-BL                                    CBRD BL
Type: IMPLANTABLE DEVICE | Site: SHOULDER | Status: FUNCTIONAL
Brand: HEALICOIL

## 2020-09-17 DEVICE — MICRORAPTOR KNOTLESS SA RGNSB
Type: IMPLANTABLE DEVICE | Site: SHOULDER | Status: FUNCTIONAL
Brand: MICRORAPTOR

## 2020-09-17 DEVICE — MULTIFIX S-ULTRA 5.5MM KNOTLESS ANCHOR
Type: IMPLANTABLE DEVICE | Site: SHOULDER | Status: FUNCTIONAL
Brand: MULTIFIX

## 2020-09-17 DEVICE — IMPLANTABLE DEVICE
Type: IMPLANTABLE DEVICE | Site: SHOULDER | Status: FUNCTIONAL
Brand: BIOINDUCTIVE IMPLANT WITH ARTHROSCOPIC DELIVERY SYSTEM - MEDIUM

## 2020-09-17 DEVICE — Z INACTIVE USE 2600835 ANCHOR TEND 8 FOR REGENETEN BIOINDUCTIVE IMPL SYS: Type: IMPLANTABLE DEVICE | Site: SHOULDER | Status: FUNCTIONAL

## 2020-09-17 DEVICE — BONE ANCHORS 3 WITH ARTHROSCOPIC DELIVERY SYSTEM ADVANCED
Type: IMPLANTABLE DEVICE | Site: SHOULDER | Status: FUNCTIONAL
Brand: BONE ANCHORS WITH ARTHROSCOPIC DELIVERY SYSTEM - ADVANCED

## 2020-09-17 RX ORDER — ACETAMINOPHEN 500 MG
1000 TABLET ORAL EVERY 6 HOURS PRN
Qty: 60 TABLET | Refills: 3 | Status: SHIPPED | OUTPATIENT
Start: 2020-09-17 | End: 2022-03-16

## 2020-09-17 RX ORDER — LIDOCAINE HYDROCHLORIDE 20 MG/ML
INJECTION, SOLUTION EPIDURAL; INFILTRATION; INTRACAUDAL; PERINEURAL PRN
Status: DISCONTINUED | OUTPATIENT
Start: 2020-09-17 | End: 2020-09-17 | Stop reason: SDUPTHER

## 2020-09-17 RX ORDER — LIDOCAINE HYDROCHLORIDE 10 MG/ML
0.5 INJECTION, SOLUTION EPIDURAL; INFILTRATION; INTRACAUDAL; PERINEURAL ONCE
Status: DISCONTINUED | OUTPATIENT
Start: 2020-09-17 | End: 2020-09-17 | Stop reason: HOSPADM

## 2020-09-17 RX ORDER — OXYCODONE HYDROCHLORIDE 5 MG/1
5 TABLET ORAL EVERY 4 HOURS PRN
Qty: 30 TABLET | Refills: 0 | Status: SHIPPED | OUTPATIENT
Start: 2020-09-17 | End: 2020-09-24

## 2020-09-17 RX ORDER — PROPOFOL 10 MG/ML
INJECTION, EMULSION INTRAVENOUS PRN
Status: DISCONTINUED | OUTPATIENT
Start: 2020-09-17 | End: 2020-09-17 | Stop reason: SDUPTHER

## 2020-09-17 RX ORDER — CLINDAMYCIN PHOSPHATE 900 MG/50ML
900 INJECTION INTRAVENOUS
Status: DISCONTINUED | OUTPATIENT
Start: 2020-09-17 | End: 2020-09-17

## 2020-09-17 RX ORDER — LIDOCAINE HYDROCHLORIDE 10 MG/ML
1 INJECTION, SOLUTION EPIDURAL; INFILTRATION; INTRACAUDAL; PERINEURAL
Status: DISCONTINUED | OUTPATIENT
Start: 2020-09-17 | End: 2020-09-17 | Stop reason: HOSPADM

## 2020-09-17 RX ORDER — SODIUM CHLORIDE 9 MG/ML
INJECTION, SOLUTION INTRAVENOUS CONTINUOUS
Status: DISCONTINUED | OUTPATIENT
Start: 2020-09-17 | End: 2020-09-17 | Stop reason: HOSPADM

## 2020-09-17 RX ORDER — HYDROMORPHONE HCL 110MG/55ML
0.25 PATIENT CONTROLLED ANALGESIA SYRINGE INTRAVENOUS EVERY 5 MIN PRN
Status: DISCONTINUED | OUTPATIENT
Start: 2020-09-17 | End: 2020-09-17 | Stop reason: HOSPADM

## 2020-09-17 RX ORDER — LABETALOL HYDROCHLORIDE 5 MG/ML
5 INJECTION, SOLUTION INTRAVENOUS ONCE
Status: COMPLETED | OUTPATIENT
Start: 2020-09-17 | End: 2020-09-17

## 2020-09-17 RX ORDER — ROPIVACAINE HYDROCHLORIDE 5 MG/ML
INJECTION, SOLUTION EPIDURAL; INFILTRATION; PERINEURAL PRN
Status: DISCONTINUED | OUTPATIENT
Start: 2020-09-17 | End: 2020-09-17 | Stop reason: SDUPTHER

## 2020-09-17 RX ORDER — MIDAZOLAM HYDROCHLORIDE 1 MG/ML
1 INJECTION INTRAMUSCULAR; INTRAVENOUS ONCE
Status: DISCONTINUED | OUTPATIENT
Start: 2020-09-17 | End: 2020-09-17

## 2020-09-17 RX ORDER — HYDROCODONE BITARTRATE AND ACETAMINOPHEN 5; 325 MG/1; MG/1
1 TABLET ORAL
Status: DISCONTINUED | OUTPATIENT
Start: 2020-09-17 | End: 2020-09-17 | Stop reason: HOSPADM

## 2020-09-17 RX ORDER — FENTANYL CITRATE 50 UG/ML
INJECTION, SOLUTION INTRAMUSCULAR; INTRAVENOUS PRN
Status: DISCONTINUED | OUTPATIENT
Start: 2020-09-17 | End: 2020-09-17 | Stop reason: SDUPTHER

## 2020-09-17 RX ORDER — SUCCINYLCHOLINE CHLORIDE 20 MG/ML
INJECTION INTRAMUSCULAR; INTRAVENOUS PRN
Status: DISCONTINUED | OUTPATIENT
Start: 2020-09-17 | End: 2020-09-17 | Stop reason: SDUPTHER

## 2020-09-17 RX ORDER — HYDROMORPHONE HCL 110MG/55ML
0.5 PATIENT CONTROLLED ANALGESIA SYRINGE INTRAVENOUS EVERY 5 MIN PRN
Status: DISCONTINUED | OUTPATIENT
Start: 2020-09-17 | End: 2020-09-17 | Stop reason: HOSPADM

## 2020-09-17 RX ORDER — FENTANYL CITRATE 50 UG/ML
50 INJECTION, SOLUTION INTRAMUSCULAR; INTRAVENOUS ONCE
Status: DISCONTINUED | OUTPATIENT
Start: 2020-09-17 | End: 2020-09-17 | Stop reason: SDUPTHER

## 2020-09-17 RX ORDER — LABETALOL HYDROCHLORIDE 5 MG/ML
INJECTION, SOLUTION INTRAVENOUS
Status: DISCONTINUED
Start: 2020-09-17 | End: 2020-09-17 | Stop reason: HOSPADM

## 2020-09-17 RX ORDER — ONDANSETRON 2 MG/ML
4 INJECTION INTRAMUSCULAR; INTRAVENOUS
Status: COMPLETED | OUTPATIENT
Start: 2020-09-17 | End: 2020-09-17

## 2020-09-17 RX ORDER — MIDAZOLAM HYDROCHLORIDE 1 MG/ML
INJECTION INTRAMUSCULAR; INTRAVENOUS PRN
Status: DISCONTINUED | OUTPATIENT
Start: 2020-09-17 | End: 2020-09-17 | Stop reason: SDUPTHER

## 2020-09-17 RX ORDER — IBUPROFEN 800 MG/1
800 TABLET ORAL EVERY 6 HOURS PRN
Qty: 120 TABLET | Refills: 3 | Status: SHIPPED | OUTPATIENT
Start: 2020-09-17 | End: 2022-03-16

## 2020-09-17 RX ORDER — ONDANSETRON 2 MG/ML
INJECTION INTRAMUSCULAR; INTRAVENOUS PRN
Status: DISCONTINUED | OUTPATIENT
Start: 2020-09-17 | End: 2020-09-17 | Stop reason: SDUPTHER

## 2020-09-17 RX ADMIN — PHENYLEPHRINE HYDROCHLORIDE 100 MCG: 10 INJECTION INTRAVENOUS at 11:17

## 2020-09-17 RX ADMIN — FENTANYL CITRATE 50 MCG: 50 INJECTION, SOLUTION INTRAMUSCULAR; INTRAVENOUS at 11:08

## 2020-09-17 RX ADMIN — PHENYLEPHRINE HYDROCHLORIDE 100 MCG: 10 INJECTION INTRAVENOUS at 12:14

## 2020-09-17 RX ADMIN — INSULIN HUMAN 2 UNITS: 100 INJECTION, SOLUTION PARENTERAL at 10:39

## 2020-09-17 RX ADMIN — ONDANSETRON 4 MG: 2 INJECTION INTRAMUSCULAR; INTRAVENOUS at 11:16

## 2020-09-17 RX ADMIN — PHENYLEPHRINE HYDROCHLORIDE 200 MCG: 10 INJECTION INTRAVENOUS at 11:20

## 2020-09-17 RX ADMIN — LIDOCAINE HYDROCHLORIDE 100 MG: 20 INJECTION, SOLUTION EPIDURAL; INFILTRATION; INTRACAUDAL; PERINEURAL at 11:09

## 2020-09-17 RX ADMIN — PHENYLEPHRINE HYDROCHLORIDE 100 MCG: 10 INJECTION INTRAVENOUS at 11:40

## 2020-09-17 RX ADMIN — SODIUM CHLORIDE: 9 INJECTION, SOLUTION INTRAVENOUS at 10:27

## 2020-09-17 RX ADMIN — MIDAZOLAM 2 MG: 1 INJECTION INTRAMUSCULAR; INTRAVENOUS at 10:43

## 2020-09-17 RX ADMIN — PHENYLEPHRINE HYDROCHLORIDE 100 MCG: 10 INJECTION INTRAVENOUS at 11:55

## 2020-09-17 RX ADMIN — PROPOFOL 200 MG: 10 INJECTION, EMULSION INTRAVENOUS at 11:10

## 2020-09-17 RX ADMIN — ONDANSETRON 4 MG: 2 INJECTION INTRAMUSCULAR; INTRAVENOUS at 13:35

## 2020-09-17 RX ADMIN — SUCCINYLCHOLINE CHLORIDE 100 MG: 20 INJECTION, SOLUTION INTRAMUSCULAR; INTRAVENOUS at 11:10

## 2020-09-17 RX ADMIN — PHENYLEPHRINE HYDROCHLORIDE 100 MCG: 10 INJECTION INTRAVENOUS at 11:28

## 2020-09-17 RX ADMIN — FENTANYL CITRATE 50 MCG: 50 INJECTION, SOLUTION INTRAMUSCULAR; INTRAVENOUS at 10:43

## 2020-09-17 RX ADMIN — LABETALOL HYDROCHLORIDE 5 MG: 5 INJECTION INTRAVENOUS at 13:38

## 2020-09-17 RX ADMIN — CEFAZOLIN SODIUM 2 G: 10 INJECTION, POWDER, FOR SOLUTION INTRAVENOUS at 10:53

## 2020-09-17 RX ADMIN — ROPIVACAINE HYDROCHLORIDE 25 ML: 5 INJECTION, SOLUTION EPIDURAL; INFILTRATION; PERINEURAL at 10:44

## 2020-09-17 RX ADMIN — SODIUM CHLORIDE: 9 INJECTION, SOLUTION INTRAVENOUS at 10:43

## 2020-09-17 ASSESSMENT — PULMONARY FUNCTION TESTS
PIF_VALUE: 0
PIF_VALUE: 24
PIF_VALUE: 23
PIF_VALUE: 18
PIF_VALUE: 23
PIF_VALUE: 25
PIF_VALUE: 24
PIF_VALUE: 15
PIF_VALUE: 23
PIF_VALUE: 22
PIF_VALUE: 24
PIF_VALUE: 19
PIF_VALUE: 23
PIF_VALUE: 23
PIF_VALUE: 22
PIF_VALUE: 24
PIF_VALUE: 2
PIF_VALUE: 24
PIF_VALUE: 24
PIF_VALUE: 2
PIF_VALUE: 22
PIF_VALUE: 14
PIF_VALUE: 23
PIF_VALUE: 24
PIF_VALUE: 23
PIF_VALUE: 2
PIF_VALUE: 23
PIF_VALUE: 14
PIF_VALUE: 23
PIF_VALUE: 24
PIF_VALUE: 23
PIF_VALUE: 18
PIF_VALUE: 24
PIF_VALUE: 23
PIF_VALUE: 24
PIF_VALUE: 23
PIF_VALUE: 23
PIF_VALUE: 19
PIF_VALUE: 23
PIF_VALUE: 24
PIF_VALUE: 23
PIF_VALUE: 24
PIF_VALUE: 24
PIF_VALUE: 23
PIF_VALUE: 23
PIF_VALUE: 14
PIF_VALUE: 24
PIF_VALUE: 23
PIF_VALUE: 6
PIF_VALUE: 23
PIF_VALUE: 19
PIF_VALUE: 16
PIF_VALUE: 0
PIF_VALUE: 23
PIF_VALUE: 23
PIF_VALUE: 24
PIF_VALUE: 15
PIF_VALUE: 22
PIF_VALUE: 23
PIF_VALUE: 22
PIF_VALUE: 24
PIF_VALUE: 20
PIF_VALUE: 23
PIF_VALUE: 24
PIF_VALUE: 23
PIF_VALUE: 23
PIF_VALUE: 22
PIF_VALUE: 24
PIF_VALUE: 23
PIF_VALUE: 17
PIF_VALUE: 24
PIF_VALUE: 24
PIF_VALUE: 18
PIF_VALUE: 23
PIF_VALUE: 2
PIF_VALUE: 24
PIF_VALUE: 24
PIF_VALUE: 23
PIF_VALUE: 1
PIF_VALUE: 25
PIF_VALUE: 15
PIF_VALUE: 23
PIF_VALUE: 23
PIF_VALUE: 24
PIF_VALUE: 23

## 2020-09-17 ASSESSMENT — PAIN - FUNCTIONAL ASSESSMENT: PAIN_FUNCTIONAL_ASSESSMENT: 0-10

## 2020-09-17 NOTE — ANESTHESIA POSTPROCEDURE EVALUATION
Department of Anesthesiology  Postprocedure Note    Patient: Saarh Arora  MRN: [id number]  [place]: 1975  Date of evaluation: 9/17/2020  Time:  2:31 PM     Procedure Summary     Date:  09/17/20 Room / Location:  [address]    Anesthesia Start:  1101 Anesthesia Stop:  1058    Procedure:  RIGHT SHOULDER ARTHROSCOPY WITH ROTATOR CUFF REPAIR WITH AUGMENTATION, BICEP TENODESIS (Right Shoulder) Diagnosis:  (M75.111  INCOMPLETE ROTATOR CUFF TEAR)    Surgeon:  Josefina Murphy MD Responsible Provider:      Anesthesia Type:  general, regional ASA Status:  2          Anesthesia Type: general, regional    Gloria Phase I: Gloria Score: 7    Gloria Phase II:      Last vitals: Reviewed and per EMR flowsheets.        Anesthesia Post Evaluation    Patient location during evaluation: PACU  Patient participation: complete - patient participated  Level of consciousness: awake  Airway patency: patent  Nausea & Vomiting: no vomiting  Complications: no  Cardiovascular status: hemodynamically stable  Respiratory status: acceptable  Hydration status: euvolemic

## 2020-09-17 NOTE — PROGRESS NOTES
Time out done, 02 on @ 2 l/min per n/c, then versed & fentanyl given per Fanny Fried CRNA ( my sedation orders d/kj since he tanisha it up out of their own stock). Then Dr Joseph Jalloh CRNA completed right intrascalene neve block, patient tolerated procedure well.

## 2020-09-17 NOTE — PROGRESS NOTES
CLINICAL PHARMACY NOTE: MEDS TO [address] [place] [address] [employer] Select Patient?: No  Total # of Prescriptions Filled: 3   The following medications were delivered to the patient:  · Oxycodone 5mg  · Ibuprofen 800mg  · Tylenol ES 500mg  Total # of Interventions Completed: 0  Time Spent (min): 30    Additional Documentation:    Delivered to Patient christopher Delgado CPhT

## 2020-09-17 NOTE — ANESTHESIA PROCEDURE NOTES
Peripheral Block    Patient location during procedure: pre-op  Start time: 9/17/2020 10:43 AM  End time: 9/17/2020 10:48 AM  Staffing  Resident/CRNA: VANDANA Garcia CRNA  Performed: resident/CRNA   Preanesthetic Checklist  Completed: patient identified, site marked, surgical consent, pre-op evaluation, timeout performed, IV checked, risks and benefits discussed, monitors and equipment checked, anesthesia consent given, oxygen available and patient being monitored  Peripheral Block  Patient position: sitting  Prep: ChloraPrep  Patient monitoring: cardiac monitor, continuous pulse ox, frequent blood pressure checks and IV access  Block type: Brachial plexus  Laterality: right  Injection technique: single-shot  Procedures: ultrasound guided, nerve stimulator and twitch noted to 0.4mA  Local infiltration: ropivacaine  Infiltration strength: 0.5 %  Dose: 25 mL  Interscalene  Provider prep: mask and sterile gloves  Local infiltration: ropivacaine  Needle  Needle type: short-bevel (insulated, nerve stimulator needle, non cutting tip)   Needle gauge: 22 G  Needle length: 5 cm  Needle localization: anatomical landmarks, nerve stimulator and ultrasound guidance  Assessment  Injection assessment: negative aspiration for heme, no paresthesia on injection and local visualized surrounding nerve on ultrasound  Paresthesia pain: none  Slow fractionated injection: yes  Hemodynamics: stable  Additional Notes  No complications. Patient tolerated procedure well.   Reason for block: post-op pain management and at surgeon's request

## 2020-09-17 NOTE — ANESTHESIA PRE PROCEDURE
Department of Anesthesiology  Preprocedure Note       Name:  Vamsi Lucio   Age:  54 y.o.  :  1975                                          MRN:  [id number]         Date:  2020      Surgeon: Sai Stauffer):  Jenifer Coronel MD    Procedure: Procedure(s):  RIGHT SHOULDER ARTHROSCOPY WITH ROTATOR CUFF REPAIR    Medications prior to admission:   Prior to Admission medications    Medication Sig Start Date End Date Taking? Authorizing Provider   cyclobenzaprine (FLEXERIL) 10 MG tablet Take 10 mg by mouth 3 times daily as needed for Muscle spasms    Historical Provider, MD   glipiZIDE (GLUCOTROL XL) 5 MG extended release tablet Take 5 mg by mouth daily    Historical Provider, MD   atorvastatin (LIPITOR) 40 MG tablet Take 40 mg by mouth daily    Historical Provider, MD   insulin glargine (LANTUS SOLOSTAR) 100 UNIT/ML injection pen Inject 44 Units into the skin nightly    Historical Provider, MD   meloxicam (MOBIC) 15 MG tablet Take 1 tablet by mouth daily 3/31/20   Jenifer Coronel MD   hydrochlorothiazide (HYDRODIURIL) 25 MG tablet Take 25 mg by mouth daily    Historical Provider, MD   amLODIPine (NORVASC) 5 MG tablet Take 5 mg by mouth daily    Historical Provider, MD   insulin aspart (NOVOLOG) 100 UNIT/ML injection vial Inject 15 Units into the skin 3 times daily (before meals) 3/17/16   Bonny Retana MD   fluconazole (DIFLUCAN) 150 MG tablet Take 150 mg by mouth every 14 days  12/13/15   Historical Provider, MD   aspirin 81 MG EC tablet Take 81 mg by mouth daily     Historical Provider, MD   FreeStyle Lancets MISC by Does not apply route. 13   Shoaib August PA-C   glucose blood VI test strips (EXACTECH TEST) strip As needed. 13   Shoaib August PA-C       Current medications:    No current facility-administered medications for this encounter.       Current Outpatient Medications   Medication Sig Dispense Refill    cyclobenzaprine (FLEXERIL) 10 MG tablet Take 10 mg by mouth 3 times daily as needed for Muscle spasms      glipiZIDE (GLUCOTROL XL) 5 MG extended release tablet Take 5 mg by mouth daily      atorvastatin (LIPITOR) 40 MG tablet Take 40 mg by mouth daily      insulin glargine (LANTUS SOLOSTAR) 100 UNIT/ML injection pen Inject 44 Units into the skin nightly      meloxicam (MOBIC) 15 MG tablet Take 1 tablet by mouth daily 30 tablet 3    hydrochlorothiazide (HYDRODIURIL) 25 MG tablet Take 25 mg by mouth daily      amLODIPine (NORVASC) 5 MG tablet Take 5 mg by mouth daily      insulin aspart (NOVOLOG) 100 UNIT/ML injection vial Inject 15 Units into the skin 3 times daily (before meals) 1 vial 3    fluconazole (DIFLUCAN) 150 MG tablet Take 150 mg by mouth every 14 days       aspirin 81 MG EC tablet Take 81 mg by mouth daily       FreeStyle Lancets MISC by Does not apply route. 30 each 0    glucose blood VI test strips (EXACTECH TEST) strip As needed. 30 strip 0       Allergies: Allergies   Allergen Reactions    Bee Venom     Penicillins Hives    Adhesive Tape Rash       Problem List:    Patient Active Problem List   Diagnosis Code    Dextrocardia Q24.0    Diabetes type 2, uncontrolled ([facility] [address] E11.65    Non compliance w medication regimen Z91.14    Chest pain R07.9    Obesity (BMI 30-39. 9) E66.9    Hyperglycemia due to type 2 diabetes mellitus ([address] E11.65    Tear of medial meniscus of right knee, current S83.241A       Past Medical History:        Diagnosis Date    Arthritis     Bacterial vaginal infection     Dextrocardia     Diabetes type 2, uncontrolled ([facility] [address] [facility]     Hyperlipidemia     Hypertension     Inversus, situs     Non compliance w medication regimen     doesn't like to take medicine    Obesity (BMI 30-39. 9)        Past Surgical History:        Procedure Laterality Date     SECTION      X2    ENDOSCOPY, COLON, DIAGNOSTIC      ESOPHAGUS SURGERY      torn esophagus    KNEE ARTHROSCOPY Left 13    PARTIAL MEDIAL MENISECITMY, SYNOVECTOMY  KNEE CARTILAGE SURGERY Right 08/24/2017    Meniscus Repair    TUBAL LIGATION         Social History:    Social History     Tobacco Use    Smoking status: Never Smoker    Smokeless tobacco: Never Used   Substance Use Topics    Alcohol use: Yes     Alcohol/week: 0.0 standard drinks     Comment: occassionally                                Counseling given: Not Answered      Vital Signs (Current):   Vitals:    09/15/20 0945   Weight: 178 lb (80.7 kg)   Height: 5' 0.5\" (1.537 m)                                              BP Readings from Last 3 Encounters:   07/23/20 126/70   06/23/20 133/83   12/20/19 (!) 141/87       NPO Status:                                                                                 BMI:   Wt Readings from Last 3 Encounters:   07/23/20 170 lb (77.1 kg)   09/15/20 178 lb (80.7 kg)   06/23/20 180 lb 9.6 oz (81.9 kg)     Body mass index is 34.19 kg/m². CBC:   Lab Results   Component Value Date    WBC 5.0 06/22/2020    RBC 4.48 06/22/2020    HGB 14.0 06/22/2020    HCT 40.9 06/22/2020    MCV 91.4 06/22/2020    RDW 13.5 06/22/2020     06/22/2020       CMP:   Lab Results   Component Value Date     06/22/2020    K 3.6 06/22/2020     06/22/2020    CO2 23 06/22/2020    BUN 15 06/22/2020    CREATININE <0.5 06/22/2020    GFRAA >60 06/22/2020    GFRAA >60 09/12/2012    AGRATIO 1.6 08/07/2018    LABGLOM >60 06/22/2020    GLUCOSE 247 06/22/2020    PROT 6.8 08/07/2018    PROT 7.7 10/22/2011    CALCIUM 8.8 06/22/2020    BILITOT 0.3 08/07/2018    ALKPHOS 58 08/07/2018    AST 15 08/07/2018    ALT 16 08/07/2018       POC Tests: No results for input(s): POCGLU, POCNA, POCK, POCCL, POCBUN, POCHEMO, POCHCT in the last 72 hours.     Coags:   Lab Results   Component Value Date    PROTIME 9.2 10/10/2014    INR 0.86 10/10/2014    APTT 27.7 10/10/2014       HCG (If Applicable):   Lab Results   Component Value Date    PREGTESTUR Negative 08/07/2018        ABGs: No results found for: PHART, PO2ART, DER9HVK, JZF3OZP, BEART, Z2YJLDZV     Type & Screen (If Applicable):  Lab Results   Component Value Date    LABABO O 09/01/2011    LABRH Positive 09/01/2011       Drug/Infectious Status (If Applicable):  No results found for: HIV, HEPCAB    COVID-19 Screening (If Applicable):   Lab Results   Component Value Date    COVID19 NOT DETECTED 09/11/2020         Anesthesia Evaluation  Patient summary reviewed and Nursing notes reviewed no history of anesthetic complications:   Airway: Mallampati: II  TM distance: >3 FB   Neck ROM: full  Mouth opening: > = 3 FB Dental: normal exam         Pulmonary:Negative Pulmonary ROS breath sounds clear to auscultation                             Cardiovascular:  Exercise tolerance: good (>4 METS),   (+) hypertension: moderate,     (-) CABG/stent, dysrhythmias and  angina      Rhythm: regular  Rate: normal                 ROS comment: Dextrocardia  6/2020 nrl stress NM echo     Neuro/Psych:      (-) seizures, TIA and CVA           GI/Hepatic/Renal:        (-) GERD       Endo/Other:    (+) DiabetesType II DM, well controlled, , .                 Abdominal:   (+) obese,         Vascular:                                    Anesthesia Plan      general and regional     ASA 2     (Risks of regional anesthesia discussed with patient include infection, hematoma, intravascular injection, temporary or permanent nerve damage. Patient verbalizes understanding and her wishes to proceed.)  Induction: intravenous. MIPS: Postoperative opioids intended, Prophylactic antiemetics administered and Postoperative trial extubation. Anesthetic plan and risks discussed with patient. Use of blood products discussed with patient whom consented to blood products. Plan discussed with CRNA.                 Sonia Aguirre MD   9/17/2020

## 2020-09-17 NOTE — PROGRESS NOTES
Received from or - drowsy,simple mask,right shoulder dressing dry and intact,ice placed,sling on,circulation good,teds/scds,warm blanket,b/p elevated will continue to monitor.

## 2020-09-17 NOTE — PROGRESS NOTES
Teaching/ education completed for home care including pain management, wound care,activity,safety precautions and infection control. Patient and fiance verbalized understanding.

## 2020-09-17 NOTE — H&P
Date of Surgery Update:  Elinor Velarde was seen, history and physical examination reviewed, and patient examined by me today. There have been no significant clinical changes since the completion of the previous history and physical.    The risk, benefits, and alternatives of the proposed procedure have been explained to the patient (or appropriate guardian) and understanding verbalized. All questions answered. Patient wishes to proceed.     Electronically signed by: Dede Kendall MD,9/17/2020,10:16 AM

## 2020-09-17 NOTE — PROGRESS NOTES
Extremely anxious ,placed on bedpan per patient wanting to uses restroom,unable to get OOB at this point.

## 2020-09-18 NOTE — OP NOTE
[address]                     [address] [facility], [address] [facility]                                OPERATIVE REPORT    PATIENT NAME: Yeison Alexander                   :        1975  MED REC NO:   [id number]                          ROOM:  ACCOUNT NO:   [de-identified]                           ADMIT DATE: 2020  PROVIDER:     Kaye Savage MD    DATE OF PROCEDURE:  2020    PREOPERATIVE DIAGNOSES:  1. Right shoulder rotator cuff tear. 2.  Right shoulder biceps tendon tear. 3.  Right shoulder outlet impingement. POSTOPERATIVE DIAGNOSES:  1. Right shoulder rotator cuff tear. 2.  Right shoulder biceps tendon tear. 3.  Right shoulder outlet impingement. OPERATION PERFORMED:  Right shoulder arthroscopy with:  1. Arthroscopic rotator cuff repair. 2.  Arthroscopic biceps tenodesis. 3.  Arthroscopic subacromial decompression. 4.  Arthroscopic augmentation of the rotator cuff repair with a  bio-inductive scaffold. PRIMARY SURGEON:  Kaye Savage MD    ANESTHESIA:  General endotracheal.  The patient also did receive an  interscalene block. IMPLANTS:  One Stevie Read and Nephew 8.04 Regenesorb Healicoil suture anchor,  one Smith and Nephew 4.5 mm MULTIFIX-S suture anchor, one Smith and  Nephew 2.8 mm MICRORAPTOR suture anchor and one Smith and Nephew size  medium Regeneten bio-inductive scaffold. BLOOD LOSS:  50 mL. CONDITION:  The patient postoperatively was stable. HISTORY:  The patient is a 65-year-old female with a history of right  shoulder pain that has failed nonoperative management with extensive  physical therapy. An MRI was obtained of the shoulder that did  demonstrate a near full thickness tear involving the rotator cuff. We  discussed treatment options. I did recommend shoulder arthroscopy with  rotator cuff repair. After discussing risks and benefits of the  procedure with her, informed consent was obtained.     OPERATIVE rotator cuff back down to. We  placed a single 3.05 Regenesorb Healicoil suture anchor along the medial  aspect of where that tear was. We passed those sutures through the  rotator cuff tissue in a horizontal mattress fashion. We tied  arthroscopic knots, which consisted of a Estrada loop followed by four  alternating half-hitches, which gave us good reapproximation of the  rotator cuff tissue back down to the footprint. We then incorporated  those sutures into a single 4.5 MULTIFIX-S lateral row anchor, which  gave us good compression of the rotator cuff tissue once again back down  to the footprint. We then inserted a size medium Regeneten  bio-inductive scaffold in through the lateral portal and centered it  directly over the rotator cuff repair. We fixed that patch medially  with tendon staples, laterally with bone staples. The shoulder was now  taken through range of motion and the patch fixation was found to be  stable. Once that was completed, the shoulder was drained. All  surgical instruments were removed from the shoulder. Portal closure was  done with 3-0 nylon. Sterile dressing was placed over the shoulder. Shoulder was placed into a sling. The patient was awakened from the  anesthesia and transferred to PACU in stable condition.         Jack Neil MD    D: 09/17/2020 13:37:05       T: 09/17/2020 21:58:53     [email or web address]  Job#: [id number]     Doc#: [id number]    CC:

## 2020-09-23 ENCOUNTER — OFFICE VISIT (OUTPATIENT)
Dept: ORTHOPEDIC SURGERY | Age: 45
End: 2020-09-23

## 2020-09-23 ENCOUNTER — TELEPHONE (OUTPATIENT)
Dept: ORTHOPEDIC SURGERY | Age: 45
End: 2020-09-23

## 2020-09-23 PROCEDURE — 99024 POSTOP FOLLOW-UP VISIT: CPT | Performed by: ORTHOPAEDIC SURGERY

## 2020-09-23 RX ORDER — OXYCODONE HYDROCHLORIDE 5 MG/1
5 TABLET ORAL EVERY 4 HOURS PRN
Qty: 30 TABLET | Refills: 0 | Status: SHIPPED | OUTPATIENT
Start: 2020-09-23 | End: 2020-09-30

## 2020-09-23 NOTE — PROGRESS NOTES
doing well with the shoulder at this time. Going to get scheduled in the physical therapy and I impressed on her the importance of getting started in physical therapy is soon as possible. I am going to refill her pain medicine for her today. I will see her back in clinic in 5 weeks for follow-up.   She will use her sling for the first 5 weeks postoperative

## 2020-09-29 ENCOUNTER — HOSPITAL ENCOUNTER (OUTPATIENT)
Dept: PHYSICAL THERAPY | Age: 45
Setting detail: THERAPIES SERIES
Discharge: HOME | End: 2020-09-29
Payer: COMMERCIAL

## 2020-09-29 PROCEDURE — 97162 PT EVAL MOD COMPLEX 30 MIN: CPT

## 2020-09-29 PROCEDURE — 97110 THERAPEUTIC EXERCISES: CPT

## 2020-09-29 PROCEDURE — 97140 MANUAL THERAPY 1/> REGIONS: CPT

## 2020-09-29 PROCEDURE — 97016 VASOPNEUMATIC DEVICE THERAPY: CPT

## 2020-09-29 NOTE — FLOWSHEET NOTE
[facility]    Physical Therapy Treatment Note/ Progress Report:     Date:  2020    Patient Name:  Gilmar Up    :  1975  MRN: [id number]  Medical/Treatment Diagnosis Information:  · Diagnosis: Z47.89 Encounter for orthopedic aftercare; M75.111 Incomplete R RTC tear; M25.511 R shoulder pain  · Treatment Diagnosis: Z47.89 Encounter for orthopedic aftercare; M75.111 Incomplete R RTC tear; M25.511 R shoulder pain  Insurance/Certification information:  PT Insurance Information: [employer] - 61 PT visits/0 used  Physician Information:  Referring Practitioner: Kurt Julien  Plan of care signed (Y/N):     Date of Patient follow up with Physician:      Progress Report: [x]  Yes  []  No     Functional Scale: 98% disability - Jeanette Dome   Date:  20    Date Range for reporting period:  Beginnin20  Ending:  NA    Progress report due (10 Rx/or 30 days whichever is less):      Recertification due (POC duration/ or 90 days whichever is less): 10/29/20     Visit # Insurance Allowable Auth Needed   1 [employer] - 60 PT visits/0 used []Yes    [x]No     Pain level:  7/10 at start of session. 10/10 at worst. 0/10 at best.      SUBJECTIVE:  See eval    OBJECTIVE: See eval   Observation:    Test measurements:      RESTRICTIONS/PRECAUTIONS: Situs inversus. Heterotaxy syndrome. Type II diabetes. High BP. Previous R & L knee scopes. [place] barger tear on esophagus. R shoulder scope, RTC repair & augmentation, SAD, biceps tenodesis 20. Exercises/Interventions:   Therapeutic Exercise  Min:  20 Wt / Resistance Sets/sec Reps Notes   Pendulums fwd/bwd, side/side, circles   30 each direction    Table slides flexion/scaption   10 each direction    Supine cane ER   20           Patient education.    5 min Purpose, focus of PT; HEP                               Manual Intervention  Min:  15       R shoulder PROM - within surgical restrictions   15 min                                              NMR Re-education  Min:  0                                                                   Therapeutic Exercise and NMR EXR  [x] (32119) Provided verbal/tactile cueing for activities related to strengthening, flexibility, endurance, ROM  for improvements in scapular, scapulothoracic and UE control with self care, reaching, carrying, lifting, house/yardwork, driving/computer work.    [] (43829) Provided verbal/tactile cueing for activities related to improving balance, coordination, kinesthetic sense, posture, motor skill, proprioception  to assist with  scapular, scapulothoracic and UE control with self care, reaching, carrying, lifting, house/yardwork, driving/computer work. Therapeutic Activities:    [] (49196 or 74573) Provided verbal/tactile cueing for activities related to improving balance, coordination, kinesthetic sense, posture, motor skill, proprioception and motor activation to allow for proper function of scapular, scapulothoracic and UE control with self care, carrying, lifting, driving/computer work.      Home Exercise Program:    [x] (74261) Reviewed/Progressed HEP activities related to strengthening, flexibility, endurance, ROM of scapular, scapulothoracic and UE control with self care, reaching, carrying, lifting, house/yardwork, driving/computer work  [] (93898) Reviewed/Progressed HEP activities related to improving balance, coordination, kinesthetic sense, posture, motor skill, proprioception of scapular, scapulothoracic and UE control with self care, reaching, carrying, lifting, house/yardwork, driving/computer work      Manual Treatments:  PROM / STM / Oscillations-Mobs:  G-I, II, III, IV (PA's, Inf., Post.)  [x] (78931) Provided manual therapy to mobilize soft tissue/joints of cervical/CT, scapular GHJ and UE for the purpose of modulating pain, promoting relaxation,  increasing ROM, reducing/eliminating soft tissue swelling/inflammation/restriction, improving soft tissue extensibility and allowing for proper ROM for normal function with self care, reaching, carrying, lifting, house/yardwork, driving/computer work    Modalities:  Game Ready to R shoulder for pain/inflammation/edema - 15 minutes. Charges:  Timed Code Treatment Minutes: 35   Total Treatment Minutes: 70       [] EVAL (LOW) 47566 (typically 20 minutes face-to-face)  [x] EVAL (MOD) 88616 (typically 30 minutes face-to-face)  [] EVAL (HIGH) 21148 (typically 45 minutes face-to-face)  [] RE-EVAL     [x] ZB(86471) x 1    [] IONTO (72864)  [] NMR (24868) x     [x] VASO (41740)  [x] Manual (84761) x 1    [] Other:  [] TA (69733)x     [] Mech Traction (70056)  [] ES(attended) (76681)     [] ES (un) (28284): If BW Please Indicate Time In/Out  CPT Code Time in Time out                                     GOALS:  Patient stated goal: Regain full use of R arm. [x] Progressing: [] Met: [] Not Met: [] Adjusted    Therapist goals for Patient:   Short Term Goals: To be achieved in: 2 weeks  1. Independent in HEP and progression per patient tolerance, in order to prevent re-injury. [x] Progressing: [] Met: [] Not Met: [] Adjusted  2. Patient will have a decrease in pain to facilitate improvement in movement, function, and ADLs as indicated by Functional Deficits. [x] Progressing: [] Met: [] Not Met: [] Adjusted    Long Term Goals: To be achieved in: 4 weeks  1. Disability index score of 20% or less for the DASH to assist with reaching prior level of function. [x] Progressing: [] Met: [] Not Met: [] Adjusted  2. Patient will demonstrate increased R shoulder AROM to allow for proper joint functioning as indicated by patients Functional Deficits. [x] Progressing: [] Met: [] Not Met: [] Adjusted  3.  Patient will demonstrate an increase in R shoulder strength to within 5# HHD compared to uninvolved L UE to allow for proper functional mobility as indicated by patients Functional Deficits. [x] Progressing: [] Met: [] Not Met: [] Adjusted  4. Patient will be able to perform all ADLs, self care tasks, and household chores using R UE without increased symptoms or restriction. [x] Progressing: [] Met: [] Not Met: [] Adjusted  5. Patient will be able to perform all work tasks using R UE without increased symptoms or restriction. [x] Progressing: [] Met: [] Not Met: [] Adjusted     Progression Towards Functional goals:  [] Patient is progressing as expected towards functional goals listed. [] Progression is slowed due to complexities listed. [] Progression has been slowed due to co-morbidities. [x] Plan just implemented, too soon to assess goals progression  [] Other:     ASSESSMENT:  See eval    Return to Play: (if applicable)   []  Stage 1: Intro to Strength   []  Stage 2: Dynamic Strength and Intro to Plyometrics   []  Stage 3: Advanced Plyometrics and Intro to Throwing   []  Stage 4: Sport specific Training/Return to Sport     []  Ready to Return to Play, [employer]   []  Not Ready for Return to Sports   Comments:      Treatment/Activity Tolerance:  [x] Patient tolerated treatment well [] Patient limited by fatique  [] Patient limited by pain  [] Patient limited by other medical complications  [] Other:     Overall Progression Towards Functional goals/ Treatment Progress Update:  [] Patient is progressing as expected towards functional goals listed. [] Progression is slowed due to complexities/Impairments listed. [] Progression has been slowed due to co-morbidities.   [x] Plan just implemented, too soon to assess goals progression <30days   [] Goals require adjustment due to lack of progress  [] Patient is not progressing as expected and requires additional follow up with physician  [] Other    Prognosis for POC: [x] Good [] Fair  [] Poor    Patient requires continued skilled intervention: [x] Yes  [] No      PLAN: See eval  [] Continue per plan of care [] Alter current plan (see comments)  [x] Plan of care initiated [] Hold pending MD visit [] Discharge    Electronically signed by: Dick Randall PT   Assisted by Munir Blount PTA    Note: If patient does not return for scheduled/recommended follow up visits, this note will serve as a discharge from care along with the most recent update on progress.

## 2020-09-29 NOTE — PLAN OF CARE
[facility]  [address] [address]  Phone [phone]   Fax [phone]                                                       Physical Therapy Certification    Dear Referring Practitioner: Guera Samuel,    We had the pleasure of evaluating the following patient for physical therapy services at [address] [place] [address]. A summary of our findings can be found in the initial assessment below. This includes our plan of care. If you have any questions or concerns regarding these findings, please do not hesitate to contact me at the office phone number checked above. Thank you for the referral.       Physician Signature:_______________________________Date:__________________  By signing above (or electronic signature), therapists plan is approved by physician      Patient: Alverto Enamorado   : 1975   MRN: [id number]  Referring Physician: Referring Practitioner: Guera Samuel      Evaluation Date: 2020      Medical Diagnosis Information:  Diagnosis: Z47.89 Encounter for orthopedic aftercare; M75.111 Incomplete R RTC tear; M25.511 R shoulder pain   Treatment Diagnosis: Z47.89 Encounter for orthopedic aftercare; M75.111 Incomplete R RTC tear; M25.511 R shoulder pain                                         Insurance information: PT Insurance Information: [facility] - 61 PT visits/0 used    Precautions/ Contra-indications: Type II diabetes. High BP. Situs inversus. Heterotaxy syndrome. Previous R & L knee scopes. Mali barger tear of esophagus.     C-SSRS Triggered by Intake questionnaire (Past 2 wk assessment):   [x] No, Questionnaire did not trigger screening.   [] Yes, Patient intake triggered further evaluation      [] C-SSRS Screening completed  [] PCP notified via Plan of Care  [] Emergency services notified     Latex Allergy:  [x]NO      []YES  Preferred Language for Healthcare: [x]English       []Other:      SUBJECTIVE: Patient stated complaint:  Patient fell down a flight of stairs in April 2018 and partially tore her R RTC. Patient did 6 weeks of PT that helped decrease pain and improve function allowing patient to avoid surgery. Patient recently started having pain in her R shoulder again exactly 2 years later in April of 2020. Patient did PT at [facility] during the quarantine period this past spring and that actually made pain worse, so patient had a R shoulder scope, RTC repair with augmentation, SAD, and biceps tenodesis on 9-17-20. Patient reports intermittent, generalized dull achiness in R shoulder with intermittent sharp, lateral shoulder pain particularly when patient has been in the sling for a prolonged period of time. Relevant Medical History:  Type II diabetes. High BP. Situs inversus. Heterotaxy syndrome. Previous R & L knee scopes. Mali barger tear of esophagus. Functional Disability Index: 98% disability - Quick Dash    Pain Scale: 7/10 at start of session. 10/10 at worst. 0/10 at best.   Easing factors: Ice machine. Oxycodone. Extra strength Tylenol. Ibuprofen. Provocative factors:  Prolonged positioning in the sling. Bumping or jostling the R shoulder. Type: []Constant   [x]Intermittent  []Radiating [x]Localized []other:     Numbness/Tingling: Numbness in the front of the R shoulder around the portal sites. R hand is intermittently going numb when she is in the sling for a prolonged period of time. Occupation/School: Patient is a  at [employer]. Patient has been there for a bout one year. The heaviest item that patient has to lift repetitively at work is generally about 40# to eye level. Patient's duties change and rotate every hour. Patient is off work right now for at least 6-8 weeks. No option for light duty. 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      Living Status/Prior Level of Function: Independent with ADLs and IADLs. Patient was previously walking in her neighborhood, at the gym on the [employer], or [employer] for 45-60 minutes for cardiovascular exercise. Patient was also doing free weights and machines 2-3 days per week for about an hour at a time. OBJECTIVE:     CERV ROM     Cervical Flexion     Cervical Extension     Cervical SB     Cervical rotation          ROM Left Right   Shoulder Flex TBA 70 p! Shoulder Abd (Scapular plane) TBA 70 p! Shoulder ER TBA 5 p! Shoulder IR TBA 25 p! Strength  Left Right   Shoulder Flex TBA TBA   Shoulder Abd TBA TBA   Shoulder ER TBA TBA   Shoulder IR TBA TBA               Reflexes/Sensation:    [x]Dermatomes/Myotomes intact    [x]Reflexes equal and normal bilaterally   []Other:    Joint mobility: R GH joint; R scapular mobility   []Normal    [x]Hypo   []Hyper    Palpation:     Functional Mobility/Transfers:   Bed mobility:  ModA    Posture:     Bandages/Dressings/Incisions: Portal sites steri-stripped. No drainage visible. Gait: (include devices/WB status): WNL    Orthopedic Special Tests: NA   Normal Abnormal N/A Comments   Painful Arc [] [] []    Resisted ER [] [] []    Ferguson-Vinh [] [] []    Champagne Toast test [] [] []    Drop arm test [] [] []    ER lag sign [] [] []    IR lift off [] [] []    Patricia Test [] [] []    [place] [] [] []    Speed's [] [] []    Apprehension [] [] []                                  [x] Patient history, allergies, meds reviewed. Medical chart reviewed. See intake form. Review Of Systems (ROS):  [x]Performed Review of systems (Integumentary, CardioPulmonary, Neurological) by intake and observation. Intake form has been scanned into medical record. Patient has been instructed to contact their primary care physician regarding ROS issues if not already being addressed at this time.       Co-morbidities/Complexities (which will affect course of rehabilitation):   []None           Arthritic conditions   []Rheumatoid arthritis (M05.9)  []Osteoarthritis (M19.91)   Cardiovascular conditions   [x]Hypertension (I10)  []Hyperlipidemia (E78.5)  []Angina pectoris (I20)  []Atherosclerosis (I70)   Musculoskeletal conditions   []Disc pathology   []Congenital spine pathologies   [x]Prior surgical intervention  []Osteoporosis (M81.8)  []Osteopenia (M85.8)   Endocrine conditions   []Hypothyroid (E03.9)  []Hyperthyroid Gastrointestinal conditions   []Constipation (H85.00)   Metabolic conditions   []Morbid obesity (E66.01)  [x]Diabetes type 1(E10.65) or 2 (E11.65)   []Neuropathy (G60.9)     Pulmonary conditions   []Asthma (J45)  []Coughing   []COPD (J44.9)   Psychological Disorders  []Anxiety (F41.9)  []Depression (F32.9)   []Other:   [x]Other:   Situs inversus. Heterotaxy syndrome. Mali wise tear of esophagus. Barriers to/and or personal factors that will affect rehab potential:              []Age  []Sex              []Motivation/Lack of Motivation                        [x]Co-Morbidities              []Cognitive Function, education/learning barriers              []Environmental, home barriers              [x]profession/work barriers  []past PT/medical experience  []other:  Justification: Co-morbidities including type II diabetes and high BP likely affecting blood flow as well as previous job duties requiring lifting up to 40# repetitively, sometimes over head, will likely result in increased tissue healing time and prolonged prognosis. Falls Risk Assessment (30 days):   [x] Falls Risk assessed and no intervention required. [] Falls Risk assessed and Patient requires intervention due to being higher risk   TUG score (>12s at risk):     [] Falls education provided, including        ASSESSMENT: Patient is a 55 y/o F who reports to PT approximately 12 days s/p R shoulder scope, RTC repair & augmentation, SAD, and biceps tenodesis on 9-17-20.   Patient demonstrates s/s consistent with post-op status including deficits in R shoulder ROM/joint mobility, strength, stability, and function. Patient will benefit from skilled PT to address deficits to enable full, safe return to PLOF and work tasks pain free and without restrictions. Functional Impairments   [x]Noted spinal or UE joint hypomobility   []Noted spinal or UE joint hypermobility   [x]Decreased UE functional ROM   [x]Decreased UE functional strength   []Abnormal reflexes/sensation/myotomal/dermatomal deficits   [x]Decreased RC/scapular/core strength and neuromuscular control   []other:      Functional Activity Limitations (from functional questionnaire and intake)   [x]Reduced ability to tolerate prolonged functional positions   [x]Reduced ability or difficulty with changes of positions or transfers between positions   [x]Reduced ability to maintain good posture and demonstrate good body mechanics with sitting, bending, and lifting   [x] Reduced ability or tolerance with driving and/or computer work   [x]Reduced ability to sleep   [x]Reduced ability to perform lifting, reaching, carrying tasks   [x]Reduced ability to tolerate impact through UE   [x]Reduced ability to reach behind back   []Reduced ability to  or hold objects   [x]Reduced ability to throw or toss an object   []other:    Participation Restrictions   [x]Reduced participation in self care activities   [x]Reduced participation in home management activities   [x]Reduced participation in work activities   [x]Reduced participation in social activities. [x]Reduced participation in sport/recreation activities. Classification:   [x]Signs/symptoms consistent with post-surgical status including decreased ROM, strength and function.   []Signs/symptoms consistent with joint sprain/strain   []Signs/symptoms consistent with shoulder impingement   []Signs/symptoms consistent with shoulder/elbow/wrist tendinopathy   [x]Signs/symptoms consistent with Rotator cuff tear   []Signs/symptoms consistent with labral tear   []Signs/symptoms consistent with postural dysfunction    []Signs/symptoms consistent with Glenohumeral IR Deficit - <45 degrees   []Signs/symptoms consistent with facet dysfunction of cervical/thoracic spine    []Signs/symptoms consistent with pathology which may benefit from Dry needling     []other:     Prognosis/Rehab Potential:      []Excellent   [x]Good    []Fair   []Poor    Tolerance of evaluation/treatment:    []Excellent   [x]Good    []Fair   []Poor    Physical Therapy Evaluation Complexity Justification  [] A history of present problem with:  [] no personal factors and/or comorbidities that impact the plan of care;  []1-2 personal factors and/or comorbidities that impact the plan of care  [x]3 personal factors and/or comorbidities that impact the plan of care  [] An examination of body systems using standardized tests and measures addressing any of the following: body structures and functions (impairments), activity limitations, and/or participation restrictions;:  [] a total of 1-2 or more elements   [x] a total of 3 or more elements   [] a total of 4 or more elements   [] A clinical presentation with:  [] stable and/or uncomplicated characteristics   [x] evolving clinical presentation with changing characteristics  [] unstable and unpredictable characteristics;   [] Clinical decision making of [] low, [x] moderate, [] high complexity using standardized patient assessment instrument and/or measurable assessment of functional outcome. [] EVAL (LOW) 09993 (typically 30 minutes face-to-face)  [x] EVAL (MOD) 53147 (typically 30 minutes face-to-face)  [] EVAL (HIGH) 83451 (typically 45 minutes face-to-face)  [] RE-EVAL     PLAN:  Begin PT to address R shoulder ROM/joint mobility, strength, stability, and function as william.   Frequency/Duration:  2 days per week for 4 Weeks:  INTERVENTIONS:  [x] Therapeutic exercise including: strength training, ROM, for Upper extremity and core   [x]  NMR activation and proprioception for UE, scap and Core   [x] Manual therapy as indicated for shoulder, scapula and spine to include: Dry Needling/IASTM, STM, PROM, Gr I-IV mobilizations, manipulation. [x] Modalities as needed that may include: thermal agents, E-stim, Biofeedback, US, iontophoresis as indicated  [x] Patient education on joint protection, postural re-education, activity modification, progression of HEP. GOALS:  Patient stated goal: Regain full use of R arm. [x] Progressing: [] Met: [] Not Met: [] Adjusted    Therapist goals for Patient:   Short Term Goals: To be achieved in: 2 weeks  1. Independent in HEP and progression per patient tolerance, in order to prevent re-injury. [x] Progressing: [] Met: [] Not Met: [] Adjusted  2. Patient will have a decrease in pain to facilitate improvement in movement, function, and ADLs as indicated by Functional Deficits. [x] Progressing: [] Met: [] Not Met: [] Adjusted    Long Term Goals: To be achieved in: 4 weeks  1. Disability index score of 20% or less for the DASH to assist with reaching prior level of function. [x] Progressing: [] Met: [] Not Met: [] Adjusted  2. Patient will demonstrate increased R shoulder AROM to allow for proper joint functioning as indicated by patients Functional Deficits. [x] Progressing: [] Met: [] Not Met: [] Adjusted  3. Patient will demonstrate an increase in R shoulder strength to within 5# HHD compared to uninvolved L UE to allow for proper functional mobility as indicated by patients Functional Deficits. [x] Progressing: [] Met: [] Not Met: [] Adjusted  4. Patient will be able to perform all ADLs, self care tasks, and household chores using R UE without increased symptoms or restriction. [x] Progressing: [] Met: [] Not Met: [] Adjusted  5. Patient will be able to perform all work tasks using R UE without increased symptoms or restriction.   [x] Progressing: [] Met: [] Not Met: [] Adjusted     Electronically signed by:  Colten Bourne

## 2020-10-01 ENCOUNTER — HOSPITAL ENCOUNTER (OUTPATIENT)
Dept: PHYSICAL THERAPY | Age: 45
Setting detail: THERAPIES SERIES
Discharge: HOME | End: 2020-10-01
Payer: COMMERCIAL

## 2020-10-01 PROCEDURE — 97016 VASOPNEUMATIC DEVICE THERAPY: CPT | Performed by: SPECIALIST/TECHNOLOGIST

## 2020-10-01 PROCEDURE — 97110 THERAPEUTIC EXERCISES: CPT | Performed by: SPECIALIST/TECHNOLOGIST

## 2020-10-01 PROCEDURE — 97140 MANUAL THERAPY 1/> REGIONS: CPT | Performed by: SPECIALIST/TECHNOLOGIST

## 2020-10-01 NOTE — FLOWSHEET NOTE
[facility]    Physical Therapy Treatment Note/ Progress Report:     Date:  10/1/2020    Patient Name:  Alexander Vidal    :  1975  MRN: [id number]  Medical/Treatment Diagnosis Information:  · Diagnosis: Z47.89 Encounter for orthopedic aftercare; M75.111 Incomplete R RTC tear; M25.511 R shoulder pain  · Treatment Diagnosis: Z47.89 Encounter for orthopedic aftercare; M75.111 Incomplete R RTC tear; M25.511 R shoulder pain  Insurance/Certification information:  PT Insurance Information: [employer] - 61 PT visits/0 used  Physician Information:  Referring Practitioner: Liban Donato 10/28  Plan of care signed (Y/N):     Date of Patient follow up with Physician:      Progress Report: [x]  Yes  []  No     Functional Scale: 98% disability - Mica Nesbitt   Date:  20    Date Range for reporting period:  Beginnin20  Ending:  NA    Progress report due (10 Rx/or 30 days whichever is less): 09     Recertification due (POC duration/ or 90 days whichever is less): 10/29/20     Visit # Insurance Allowable Auth Needed   2  10/1 [employer] [facility] - 60 PT visits/0 used []Yes    [x]No     Pain level:  5-6/10 at start of session. 10/10 at worst. 0/10 at best.    SUBJECTIVE: 20' late today because she thought her appt was at 1:30. Pt reports having less pain today but took a pain pill before and after last session. Pt admits that she hasn't done any exercises since last night. OBJECTIVE:    Observation:    10/1/20 Rt bicep with mild ecchymosis present. Pt observed keeping arm guarded against her side even when the sling is off.  Test measurements:      RESTRICTIONS/PRECAUTIONS: Situs inversus. Heterotaxy syndrome. Type II diabetes. High BP. Previous R & L knee scopes. Mali barger tear on esophagus. R shoulder scope, RTC repair & augmentation, SAD, biceps tenodesis 20.     Exercises/Interventions:   Therapeutic Exercise  Min:  20 Wt / Resistance Sets/sec Reps Notes   Pendulums fwd/bwd, side/side, circles   30 each direction    Table slides flexion/scaption   10 each direction    Supine/ seated  cane ER  10\" 10x    scap retractions/ shrugs    20x                                       Manual Intervention  Min:  23       R shoulder PROM - within surgical restrictions   15 min GH mobs grade 1   STM over anterior bicep with ecchymosis present   8'                                       NMR Re-education  Min:  0                                                                   Therapeutic Exercise and NMR EXR  [x] (73308) Provided verbal/tactile cueing for activities related to strengthening, flexibility, endurance, ROM  for improvements in scapular, scapulothoracic and UE control with self care, reaching, carrying, lifting, house/yardwork, driving/computer work.    [] (42000) Provided verbal/tactile cueing for activities related to improving balance, coordination, kinesthetic sense, posture, motor skill, proprioception  to assist with  scapular, scapulothoracic and UE control with self care, reaching, carrying, lifting, house/yardwork, driving/computer work. Therapeutic Activities:  Discussed HEP regarding increasing frequency of ROM and increasing  Frequency to 3x/ day and not allowing 10-12 hours in between HEP sessions. Pt advised we need to increase PT frequency to 3x week until motion is on track with DM hx.   [x] (05691 or 82423) Provided verbal/tactile cueing for activities related to improving balance, coordination, kinesthetic sense, posture, motor skill, proprioception and motor activation to allow for proper function of scapular, scapulothoracic and UE control with self care, carrying, lifting, driving/computer work.      Home Exercise Program:    [x] (55415) Reviewed/Progressed HEP activities related to strengthening, flexibility, endurance, ROM of scapular, scapulothoracic and UE control with self care, reaching, carrying, lifting, house/yardwork, driving/computer work  [] (33647) Reviewed/Progressed HEP activities related to improving balance, coordination, kinesthetic sense, posture, motor skill, proprioception of scapular, scapulothoracic and UE control with self care, reaching, carrying, lifting, house/yardwork, driving/computer work      Manual Treatments:  PROM / STM / Oscillations-Mobs:  G-I, II, III, IV (PA's, Inf., Post.)  [x] (48581) Provided manual therapy to mobilize soft tissue/joints of cervical/CT, scapular GHJ and UE for the purpose of modulating pain, promoting relaxation,  increasing ROM, reducing/eliminating soft tissue swelling/inflammation/restriction, improving soft tissue extensibility and allowing for proper ROM for normal function with self care, reaching, carrying, lifting, house/yardwork, driving/computer work    Modalities:  Game Ready to R shoulder for pain/inflammation/edema - 15 minutes. Charges:  Timed Code Treatment Minutes: 45   Total Treatment Minutes: 60       [] EVAL (LOW) 85887 (typically 20 minutes face-to-face)  [] EVAL (MOD) 02831 (typically 30 minutes face-to-face)  [] EVAL (HIGH) 97679 (typically 45 minutes face-to-face)  [] RE-EVAL     [x] SC(20860) x 1    [] IONTO (42485)  [] NMR (99297) x     [x] VASO (69697)  [x] Manual (74681) x 2    [] Other:  [] TA (94595)x     [] Mech Traction (56353)  [] ES(attended) (33615)     [] ES (un) (33172): If BW Please Indicate Time In/Out  CPT Code Time in Time out                                     GOALS:  Patient stated goal: Regain full use of R arm. [x] Progressing: [] Met: [] Not Met: [] Adjusted    Therapist goals for Patient:   Short Term Goals: To be achieved in: 2 weeks  1. Independent in HEP and progression per patient tolerance, in order to prevent re-injury. [x] Progressing: [] Met: [] Not Met: [] Adjusted  2.  Patient will have a decrease in pain to facilitate improvement in movement, function, and ADLs as indicated by Functional Deficits. [x] Progressing: [] Met: [] Not Met: [] Adjusted    Long Term Goals: To be achieved in: 4 weeks  1. Disability index score of 20% or less for the DASH to assist with reaching prior level of function. [x] Progressing: [] Met: [] Not Met: [] Adjusted  2. Patient will demonstrate increased R shoulder AROM to allow for proper joint functioning as indicated by patients Functional Deficits. [x] Progressing: [] Met: [] Not Met: [] Adjusted  3. Patient will demonstrate an increase in R shoulder strength to within 5# HHD compared to uninvolved L UE to allow for proper functional mobility as indicated by patients Functional Deficits. [x] Progressing: [] Met: [] Not Met: [] Adjusted  4. Patient will be able to perform all ADLs, self care tasks, and household chores using R UE without increased symptoms or restriction. [x] Progressing: [] Met: [] Not Met: [] Adjusted  5. Patient will be able to perform all work tasks using R UE without increased symptoms or restriction. [x] Progressing: [] Met: [] Not Met: [] Adjusted     Progression Towards Functional goals:  [] Patient is progressing as expected towards functional goals listed. [] Progression is slowed due to complexities listed. [] Progression has been slowed due to co-morbidities. [x] Plan just implemented, too soon to assess goals progression  [] Other:     ASSESSMENT:  Pt was very guarded with PROM today and has mild amount of ecchymosis over anterior arm bicep. Pt has an exaggerated pain response during PROM. Pt admitted to not doing her HEP since last night which may have increased her stiffness and is relying on pain meds to control symptoms versus increasing her mobility with HEP. Pt needed frequent cues to relax arm to allow for increased PROM. Pt has a history with DM and is very stiff and had a delayed start with PT sessions.      Return to Play: (if applicable)   []  Stage 1: Intro to Strength   []  Stage 2: Dynamic Strength and Intro to

## 2020-10-05 ENCOUNTER — TELEPHONE (OUTPATIENT)
Dept: ORTHOPEDIC SURGERY | Age: 45
End: 2020-10-05

## 2020-10-05 ENCOUNTER — HOSPITAL ENCOUNTER (OUTPATIENT)
Dept: PHYSICAL THERAPY | Age: 45
Setting detail: THERAPIES SERIES
Discharge: HOME | End: 2020-10-05
Payer: COMMERCIAL

## 2020-10-05 PROCEDURE — 97016 VASOPNEUMATIC DEVICE THERAPY: CPT

## 2020-10-05 PROCEDURE — 97110 THERAPEUTIC EXERCISES: CPT

## 2020-10-05 PROCEDURE — 97140 MANUAL THERAPY 1/> REGIONS: CPT

## 2020-10-05 NOTE — LETTER
[facility] [address] [address]  Phone: [phone]  Fax: [phone]    Elias Burns MD        October 5, 2020     Patient: Rayray Lundberg   YOB: 1975   Date of Visit: 09/23/2020       To Whom It May Concern: It is my medical opinion that Ishaan Lo had surgery on 9/17/2020 her estimated return to work date is 11/16/2020. If you have any questions or concerns, please don't hesitate to call.     Sincerely,          Elias Burns MD

## 2020-10-05 NOTE — TELEPHONE ENCOUNTER
Patient called and requested a letter for work with her surgery date and ext. RTW date. Letter placed in chart at patient request she will  at therapy.

## 2020-10-05 NOTE — FLOWSHEET NOTE
wise tear on esophagus. R shoulder scope, RTC repair & augmentation, SAD, biceps tenodesis 9/17/20. Exercises/Interventions:   Therapeutic Exercise  Min:  20 Wt / Resistance Sets/sec Reps Notes   Pendulums fwd/bwd, side/side, circles   30 each direction    Pulleys scaption   10    Table slides flexion/scaption   10 each direction    Supine/seated cane ER  10s 10    Scap retractions - seated  5s 20                                       Manual Intervention  Min:  15       R shoulder PROM - within surgical restrictions   15 min    STM over anterior bicep with ecchymosis present   np                                       NMR Re-education  Min:  0                                                                   Therapeutic Exercise and NMR EXR  [x] (95857) Provided verbal/tactile cueing for activities related to strengthening, flexibility, endurance, ROM  for improvements in scapular, scapulothoracic and UE control with self care, reaching, carrying, lifting, house/yardwork, driving/computer work.    [] (63800) Provided verbal/tactile cueing for activities related to improving balance, coordination, kinesthetic sense, posture, motor skill, proprioception  to assist with  scapular, scapulothoracic and UE control with self care, reaching, carrying, lifting, house/yardwork, driving/computer work. Therapeutic Activities:  Discussed HEP regarding increasing frequency of ROM and increasing  Frequency to 3x/ day and not allowing 10-12 hours in between HEP sessions. Pt advised we need to increase PT frequency to 3x week until motion is on track with DM hx.   [x] (50690 or 02767) Provided verbal/tactile cueing for activities related to improving balance, coordination, kinesthetic sense, posture, motor skill, proprioception and motor activation to allow for proper function of scapular, scapulothoracic and UE control with self care, carrying, lifting, driving/computer work.      Home Exercise Program:    [] (39432) Reviewed/Progressed HEP activities related to strengthening, flexibility, endurance, ROM of scapular, scapulothoracic and UE control with self care, reaching, carrying, lifting, house/yardwork, driving/computer work  [] (11582) Reviewed/Progressed HEP activities related to improving balance, coordination, kinesthetic sense, posture, motor skill, proprioception of scapular, scapulothoracic and UE control with self care, reaching, carrying, lifting, house/yardwork, driving/computer work      Manual Treatments:  PROM / STM / Oscillations-Mobs:  G-I, II, III, IV (PA's, Inf., Post.)  [x] (53236) Provided manual therapy to mobilize soft tissue/joints of cervical/CT, scapular GHJ and UE for the purpose of modulating pain, promoting relaxation,  increasing ROM, reducing/eliminating soft tissue swelling/inflammation/restriction, improving soft tissue extensibility and allowing for proper ROM for normal function with self care, reaching, carrying, lifting, house/yardwork, driving/computer work    Modalities:  Game Ready to R shoulder for pain/inflammation/edema - 15 minutes. Charges:  Timed Code Treatment Minutes: 35   Total Treatment Minutes: 50       [] EVAL (LOW) 18529 (typically 20 minutes face-to-face)  [] EVAL (MOD) 14670 (typically 30 minutes face-to-face)  [] EVAL (HIGH) 96258 (typically 45 minutes face-to-face)  [] RE-EVAL     [x] UA(86643) x 1    [] IONTO (56052)  [] NMR (66810) x     [x] VASO (39731)  [x] Manual (47426) x 1    [] Other:  [] TA (04321)x     [] [facility] (40207)  [] ES(attended) (72756)     [] ES (un) (80746): If BW Please Indicate Time In/Out  CPT Code Time in Time out                                     GOALS:  Patient stated goal: Regain full use of R arm. [x] Progressing: [] Met: [] Not Met: [] Adjusted    Therapist goals for Patient:   Short Term Goals: To be achieved in: 2 weeks  1. Independent in HEP and progression per patient tolerance, in order to prevent re-injury.    [x] Progressing: [] Met: [] Not Met: [] Adjusted  2. Patient will have a decrease in pain to facilitate improvement in movement, function, and ADLs as indicated by Functional Deficits. [x] Progressing: [] Met: [] Not Met: [] Adjusted    Long Term Goals: To be achieved in: 4 weeks  1. Disability index score of 20% or less for the DASH to assist with reaching prior level of function. [x] Progressing: [] Met: [] Not Met: [] Adjusted  2. Patient will demonstrate increased R shoulder AROM to allow for proper joint functioning as indicated by patients Functional Deficits. [x] Progressing: [] Met: [] Not Met: [] Adjusted  3. Patient will demonstrate an increase in R shoulder strength to within 5# HHD compared to uninvolved L UE to allow for proper functional mobility as indicated by patients Functional Deficits. [x] Progressing: [] Met: [] Not Met: [] Adjusted  4. Patient will be able to perform all ADLs, self care tasks, and household chores using R UE without increased symptoms or restriction. [x] Progressing: [] Met: [] Not Met: [] Adjusted  5. Patient will be able to perform all work tasks using R UE without increased symptoms or restriction. [x] Progressing: [] Met: [] Not Met: [] Adjusted     Progression Towards Functional goals:  [] Patient is progressing as expected towards functional goals listed. [] Progression is slowed due to complexities listed. [] Progression has been slowed due to co-morbidities. [x] Plan just implemented, too soon to assess goals progression  [] Other:     ASSESSMENT:  Patient reporting improved compliance with HEP and, as a result, notes overall decreased resting R shoulder pain levels. Noted decreased muscle guarding, improved tolerance to PROM, and improved R shoulder mobility today. Trialed pulleys as progression of PROM, but patient did not tolerate well.      Return to Play: (if applicable)   []  Stage 1: Intro to Strength   []  Stage 2: Dynamic Strength and Intro to Plyometrics   []  Stage 3: Advanced Plyometrics and Intro to Throwing   []  Stage 4: Sport specific Training/Return to Sport     []  Ready to Return to Play, [employer]   []  Not Ready for Return to Sports   Comments:      Treatment/Activity Tolerance:  [x] Patient tolerated treatment well [] Patient limited by fatique  [] Patient limited by pain  [] Patient limited by other medical complications  [] Other:     Overall Progression Towards Functional goals/ Treatment Progress Update:  [] Patient is progressing as expected towards functional goals listed. [] Progression is slowed due to complexities/Impairments listed. [] Progression has been slowed due to co-morbidities. [x] Plan just implemented, too soon to assess goals progression <30days   [] Goals require adjustment due to lack of progress  [] Patient is not progressing as expected and requires additional follow up with physician  [] Other    Prognosis for POC: [x] Good [] Fair  [] Poor    Patient requires continued skilled intervention: [x] Yes  [] No      PLAN: Pt. Advised to increase Rt shoulder/ elbow mobility out of sling around the house due to guarded nature to decrease shoulder stiffness and improve overall mobility. Advised pt to schedule 3x week to get ROM going. Pt admits she will be out of town from Oct 16 and into next week. [x] Continue per plan of care [] Alter current plan (see comments)  [] Plan of care initiated [] Hold pending MD visit [] Discharge    Electronically signed by: [place], [id number]       Note: If patient does not return for scheduled/recommended follow up visits, this note will serve as a discharge from care along with the most recent update on progress.

## 2020-10-06 ENCOUNTER — APPOINTMENT (OUTPATIENT)
Dept: PHYSICAL THERAPY | Age: 45
End: 2020-10-06
Payer: COMMERCIAL

## 2020-10-07 ENCOUNTER — HOSPITAL ENCOUNTER (OUTPATIENT)
Dept: PHYSICAL THERAPY | Age: 45
Setting detail: THERAPIES SERIES
Discharge: HOME | End: 2020-10-07
Payer: COMMERCIAL

## 2020-10-07 PROCEDURE — 97110 THERAPEUTIC EXERCISES: CPT

## 2020-10-07 PROCEDURE — 97140 MANUAL THERAPY 1/> REGIONS: CPT

## 2020-10-07 PROCEDURE — 97016 VASOPNEUMATIC DEVICE THERAPY: CPT

## 2020-10-07 NOTE — FLOWSHEET NOTE
[facility]    Physical Therapy Treatment Note/ Progress Report:     Date:  10/7/2020    Patient Name:  Meir Dawkins    :  1975  MRN: [id number]  Medical/Treatment Diagnosis Information:  · Diagnosis: Z47.89 Encounter for orthopedic aftercare; M75.111 Incomplete R RTC tear; M25.511 R shoulder pain  · Treatment Diagnosis: Z47.89 Encounter for orthopedic aftercare; M75.111 Incomplete R RTC tear; M25.511 R shoulder pain  Insurance/Certification information:  PT Insurance Information: [employer] - 61 PT visits/0 used  Physician Information:  Referring Practitioner: Lawrence Nixon  Plan of care signed (Y/N):     Date of Patient follow up with Physician: 10/28     Progress Report: []  Yes  [x]  No     Functional Scale: 98% disability - Sandoval Chin   Date:  20    Date Range for reporting period:  Beginnin20  Ending:  NA    Progress report due (10 Rx/or 30 days whichever is less):      Recertification due (POC duration/ or 90 days whichever is less): 10/29/20     Visit # Insurance Allowable Auth Needed   4 [employer] - 60 PT visits/0 used []Yes    [x]No     Pain level:  0/10 at start of session; 4-5/10 with movement     SUBJECTIVE:   Patient feels like her pain has been worse the past couple days, but she has also been trying to be more independent in getting herself dressed and in performing all of her normal self care tasks. Patient is ready to be driving again. Wants to know when she can return to driving safely. OBJECTIVE:    Observation:    10/1/20 Rt bicep with mild ecchymosis present. Pt observed keeping arm guarded against her side even when the sling is off.  Test measurements:       RESTRICTIONS/PRECAUTIONS: Situs inversus. Heterotaxy syndrome. Type II diabetes. High BP. Previous R & L knee scopes. [place] barger tear on esophagus.  R shoulder scope, RTC repair & augmentation, SAD, biceps tenodesis 9/17/20. Exercises/Interventions:   Therapeutic Exercise  Min:  20 Wt / Resistance Sets/sec Reps Notes   Pendulums fwd/bwd, side/side, circles   30 each direction    Pulleys scaption   10    Table slides flexion/scaption   10 each direction    Supine/seated cane ER  10s 10    Scap retractions - seated  5s 20                                       Manual Intervention  Min:  15       R shoulder PROM - within surgical restrictions   15 min    STM over anterior bicep with ecchymosis present   np                                       NMR Re-education  Min:  0                                                                   Therapeutic Exercise and NMR EXR  [x] (82951) Provided verbal/tactile cueing for activities related to strengthening, flexibility, endurance, ROM  for improvements in scapular, scapulothoracic and UE control with self care, reaching, carrying, lifting, house/yardwork, driving/computer work.    [] (94391) Provided verbal/tactile cueing for activities related to improving balance, coordination, kinesthetic sense, posture, motor skill, proprioception  to assist with  scapular, scapulothoracic and UE control with self care, reaching, carrying, lifting, house/yardwork, driving/computer work. Therapeutic Activities:  Discussed HEP regarding increasing frequency of ROM and increasing  Frequency to 3x/ day and not allowing 10-12 hours in between HEP sessions. Pt advised we need to increase PT frequency to 3x week until motion is on track with DM hx.   [x] (53795 or 82891) Provided verbal/tactile cueing for activities related to improving balance, coordination, kinesthetic sense, posture, motor skill, proprioception and motor activation to allow for proper function of scapular, scapulothoracic and UE control with self care, carrying, lifting, driving/computer work.      Home Exercise Program:    [] (35632) Reviewed/Progressed HEP activities related to strengthening, flexibility, endurance, ROM of scapular, scapulothoracic and UE control with self care, reaching, carrying, lifting, house/yardwork, driving/computer work  [] (19911) Reviewed/Progressed HEP activities related to improving balance, coordination, kinesthetic sense, posture, motor skill, proprioception of scapular, scapulothoracic and UE control with self care, reaching, carrying, lifting, house/yardwork, driving/computer work      Manual Treatments:  PROM / STM / Oscillations-Mobs:  G-I, II, III, IV (PA's, Inf., Post.)  [x] (50422) Provided manual therapy to mobilize soft tissue/joints of cervical/CT, scapular GHJ and UE for the purpose of modulating pain, promoting relaxation,  increasing ROM, reducing/eliminating soft tissue swelling/inflammation/restriction, improving soft tissue extensibility and allowing for proper ROM for normal function with self care, reaching, carrying, lifting, house/yardwork, driving/computer work    Modalities:  Game Ready to R shoulder for pain/inflammation/edema - 15 minutes. Charges:  Timed Code Treatment Minutes: 35   Total Treatment Minutes: 50       [] EVAL (LOW) 97465 (typically 20 minutes face-to-face)  [] EVAL (MOD) 95670 (typically 30 minutes face-to-face)  [] EVAL (HIGH) 70361 (typically 45 minutes face-to-face)  [] RE-EVAL     [x] BF(12412) x 1    [] IONTO (06404)  [] NMR (27966) x     [x] VASO (39661)  [x] Manual (14656) x 1    [] Other:  [] TA (23508)x     [] Mech Traction (89367)  [] ES(attended) (54267)     [] ES (un) (55372): If [facility] Please Indicate Time In/Out  CPT Code Time in Time out                                     GOALS:  Patient stated goal: Regain full use of R arm. [x] Progressing: [] Met: [] Not Met: [] Adjusted    Therapist goals for Patient:   Short Term Goals: To be achieved in: 2 weeks  1. Independent in HEP and progression per patient tolerance, in order to prevent re-injury. [x] Progressing: [] Met: [] Not Met: [] Adjusted  2.  Patient will have a decrease in pain to facilitate specific Training/Return to Sport     []  Ready to Return to Play, [employer]   []  Not Ready for Return to Sports   Comments:      Treatment/Activity Tolerance:  [x] Patient tolerated treatment well [] Patient limited by fatique  [] Patient limited by pain  [] Patient limited by other medical complications  [] Other:     Overall Progression Towards Functional goals/ Treatment Progress Update:  [x] Patient is progressing as expected towards functional goals listed. [] Progression is slowed due to complexities/Impairments listed. [] Progression has been slowed due to co-morbidities. [] Plan just implemented, too soon to assess goals progression <30days   [] Goals require adjustment due to lack of progress  [] Patient is not progressing as expected and requires additional follow up with physician  [] Other    Prognosis for POC: [x] Good [] Fair  [] Poor    Patient requires continued skilled intervention: [x] Yes  [] No      PLAN: Pt. Advised to increase Rt shoulder/ elbow mobility out of sling around the house due to guarded nature to decrease shoulder stiffness and improve overall mobility. Advised pt to schedule 3x week to get ROM going. Pt admits she will be out of town from Oct 16 and into next week. [x] Continue per plan of care [] Alter current plan (see comments)  [] Plan of care initiated [] Hold pending MD visit [] Discharge    Electronically signed by: [place], [id number]       Note: If patient does not return for scheduled/recommended follow up visits, this note will serve as a discharge from care along with the most recent update on progress.

## 2020-10-09 ENCOUNTER — HOSPITAL ENCOUNTER (OUTPATIENT)
Dept: PHYSICAL THERAPY | Age: 45
Setting detail: THERAPIES SERIES
Discharge: HOME | End: 2020-10-09
Payer: COMMERCIAL

## 2020-10-09 NOTE — FLOWSHEET NOTE
[facility]    Physical Therapy  Cancellation/No-show Note  Patient Name:  Sheree Lara  :  1975   Date:  10/9/2020  Cancelled visits to date: 1  No-shows to date: 0    For today's appointment patient:  [x]  Cancelled  []  Rescheduled appointment  []  No-show     Reason given by patient:  []  Patient ill  []  Conflicting appointment  []  No transportation    []  Conflict with work  []  No reason given  [x]  Other:     Comments:   Cannot make it. Phone call information:   []  Phone call made today to patient at _ time at number provided:      []  Patient answered, conversation as follows:    []  Patient did not answer, message left as follows:  []  Phone call not made today  [x]  Phone call not needed - pt contacted us to cancel and provided reason for cancellation.      Electronically signed by:  Jaison Horner PT

## 2020-10-13 ENCOUNTER — HOSPITAL ENCOUNTER (OUTPATIENT)
Dept: PHYSICAL THERAPY | Age: 45
Setting detail: THERAPIES SERIES
Discharge: HOME | End: 2020-10-13
Payer: COMMERCIAL

## 2020-10-13 PROCEDURE — 97110 THERAPEUTIC EXERCISES: CPT

## 2020-10-13 PROCEDURE — 97140 MANUAL THERAPY 1/> REGIONS: CPT

## 2020-10-13 PROCEDURE — 97016 VASOPNEUMATIC DEVICE THERAPY: CPT

## 2020-10-13 NOTE — FLOWSHEET NOTE
[facility]    Physical Therapy Treatment Note/ Progress Report:     Date:  10/13/2020    Patient Name:  Oj Saunders    :  1975  MRN: [id number]  Medical/Treatment Diagnosis Information:  · Diagnosis: Z47.89 Encounter for orthopedic aftercare; M75.111 Incomplete R RTC tear; M25.511 R shoulder pain  · Treatment Diagnosis: Z47.89 Encounter for orthopedic aftercare; M75.111 Incomplete R RTC tear; M25.511 R shoulder pain  Insurance/Certification information:  PT Insurance Information: [employer] - 61 PT visits/0 used  Physician Information:  Referring Practitioner: Nasra Grimaldo  Plan of care signed (Y/N):     Date of Patient follow up with Physician: 10/28     Progress Report: []  Yes  [x]  No     Functional Scale: 98% disability - Kit Margiein   Date:  20    Date Range for reporting period:  Beginnin20  Ending:  NA    Progress report due (10 Rx/or 30 days whichever is less):      Recertification due (POC duration/ or 90 days whichever is less): 10/29/20     Visit # Insurance Allowable Auth Needed   5 [employer] - 61 PT visits/0 used []Yes    [x]No     Pain level:  0/10 at start of session; 4-5/10 with movement     SUBJECTIVE:   Patient drove herself to session today for the first time since surgery. Patient controlling pain with acetominophen alone. Patient also slept flat in bed for the first time this week. OBJECTIVE:    Observation:    10/1/20 Rt bicep with mild ecchymosis present. Pt observed keeping arm guarded against her side even when the sling is off.  Test measurements:       RESTRICTIONS/PRECAUTIONS: Situs inversus. Heterotaxy syndrome. Type II diabetes. High BP. Previous R & L knee scopes. [place] barger tear on esophagus. R shoulder scope, RTC repair & augmentation, SAD, biceps tenodesis 20.     Exercises/Interventions:   Therapeutic Exercise  Min:  20 Wt / Resistance Sets/sec Reps Notes Pendulums fwd/bwd, side/side, circles   30 each direction    Pulleys scaption   2 min    Table slides flexion/scaption   10 each direction    Supine/seated cane ER  10s 10    Scap retractions - seated  5s 20                                       Manual Intervention  Min:  15       R shoulder PROM - within surgical restrictions   15 min    STM over anterior bicep with ecchymosis present   np                                       NMR Re-education  Min:  0                                                                   Therapeutic Exercise and NMR EXR  [x] (47503) Provided verbal/tactile cueing for activities related to strengthening, flexibility, endurance, ROM  for improvements in scapular, scapulothoracic and UE control with self care, reaching, carrying, lifting, house/yardwork, driving/computer work.    [] (99776) Provided verbal/tactile cueing for activities related to improving balance, coordination, kinesthetic sense, posture, motor skill, proprioception  to assist with  scapular, scapulothoracic and UE control with self care, reaching, carrying, lifting, house/yardwork, driving/computer work. Therapeutic Activities:  Discussed HEP regarding increasing frequency of ROM and increasing  Frequency to 3x/ day and not allowing 10-12 hours in between HEP sessions. Pt advised we need to increase PT frequency to 3x week until motion is on track with DM hx.   [x] (87858 or 09139) Provided verbal/tactile cueing for activities related to improving balance, coordination, kinesthetic sense, posture, motor skill, proprioception and motor activation to allow for proper function of scapular, scapulothoracic and UE control with self care, carrying, lifting, driving/computer work.      Home Exercise Program:    [] (10241) Reviewed/Progressed HEP activities related to strengthening, flexibility, endurance, ROM of scapular, scapulothoracic and UE control with self care, reaching, carrying, lifting, house/yardwork, [] Met: [] Not Met: [] Adjusted    Long Term Goals: To be achieved in: 4 weeks  1. Disability index score of 20% or less for the DASH to assist with reaching prior level of function. [x] Progressing: [] Met: [] Not Met: [] Adjusted  2. Patient will demonstrate increased R shoulder AROM to allow for proper joint functioning as indicated by patients Functional Deficits. [x] Progressing: [] Met: [] Not Met: [] Adjusted  3. Patient will demonstrate an increase in R shoulder strength to within 5# HHD compared to uninvolved L UE to allow for proper functional mobility as indicated by patients Functional Deficits. [x] Progressing: [] Met: [] Not Met: [] Adjusted  4. Patient will be able to perform all ADLs, self care tasks, and household chores using R UE without increased symptoms or restriction. [x] Progressing: [] Met: [] Not Met: [] Adjusted  5. Patient will be able to perform all work tasks using R UE without increased symptoms or restriction. [x] Progressing: [] Met: [] Not Met: [] Adjusted     Progression Towards Functional goals:  [x] Patient is progressing as expected towards functional goals listed. [] Progression is slowed due to complexities listed. [] Progression has been slowed due to co-morbidities. [] Plan just implemented, too soon to assess goals progression  [] Other:     ASSESSMENT:  Patient back to driving. She was also able to sleep flat in bed for the first time this week. Continued focus on gentle R shoulder ROM via manual techniques and self stretching and on gentle periscapular activation. Patient tolerated more aggressive stretching and passive range today both with manual techniques and self stretching. Re-initiated pulleys today to progress R shoulder ROM.      Return to Play: (if applicable)   []  Stage 1: Intro to Strength   []  Stage 2: Dynamic Strength and Intro to Plyometrics   []  Stage 3: Advanced Plyometrics and Intro to Throwing   []  Stage 4: Sport specific Training/Return to Sport     []  Ready to Return to Play, [employer]   []  Not Ready for Return to Sports   Comments:      Treatment/Activity Tolerance:  [x] Patient tolerated treatment well [] Patient limited by fatique  [] Patient limited by pain  [] Patient limited by other medical complications  [] Other:     Overall Progression Towards Functional goals/ Treatment Progress Update:  [x] Patient is progressing as expected towards functional goals listed. [] Progression is slowed due to complexities/Impairments listed. [] Progression has been slowed due to co-morbidities. [] Plan just implemented, too soon to assess goals progression <30days   [] Goals require adjustment due to lack of progress  [] Patient is not progressing as expected and requires additional follow up with physician  [] Other    Prognosis for POC: [x] Good [] Fair  [] Poor    Patient requires continued skilled intervention: [x] Yes  [] No      PLAN: Pt. Advised to increase Rt shoulder/ elbow mobility out of sling around the house due to guarded nature to decrease shoulder stiffness and improve overall mobility. Advised pt to schedule 3x week to get ROM going. [x] Continue per plan of care [] Alter current plan (see comments)  [] Plan of care initiated [] Hold pending MD visit [] Discharge    Electronically signed by: [place], [id number]       Note: If patient does not return for scheduled/recommended follow up visits, this note will serve as a discharge from care along with the most recent update on progress.

## 2020-10-15 ENCOUNTER — HOSPITAL ENCOUNTER (OUTPATIENT)
Dept: PHYSICAL THERAPY | Age: 45
Setting detail: THERAPIES SERIES
Discharge: HOME | End: 2020-10-15
Payer: COMMERCIAL

## 2020-10-15 NOTE — PROGRESS NOTES
Physical Therapy    [place], [facility]    Physical Therapy  Cancellation/No-show Note  Patient Name:  Yuli Beltran  :  1975   Date:  10/15/2020  Cancelled visits to date: 1  No-shows to date: 0    For today's appointment patient:  [x]  Cancelled  []  Rescheduled appointment  []  No-show     Reason given by patient:  [x]  Patient ill  []  Conflicting appointment  []  No transportation    []  Conflict with work  []  No reason given  []  Other:     Comments:      Phone call information:   []  Phone call made today to patient at _ time at number provided:      []  Patient answered, conversation as follows:    []  Patient did not answer, message left as follows:  []  Phone call not made today  [x]  Phone call not needed - pt contacted us to cancel and provided reason for cancellation.      Electronically signed by:  Laurie Sosa [place], [id number]

## 2020-10-20 ENCOUNTER — HOSPITAL ENCOUNTER (OUTPATIENT)
Dept: PHYSICAL THERAPY | Age: 45
Setting detail: THERAPIES SERIES
Discharge: HOME | End: 2020-10-20
Payer: COMMERCIAL

## 2020-10-20 PROCEDURE — 97140 MANUAL THERAPY 1/> REGIONS: CPT | Performed by: SPECIALIST/TECHNOLOGIST

## 2020-10-20 PROCEDURE — 97110 THERAPEUTIC EXERCISES: CPT | Performed by: SPECIALIST/TECHNOLOGIST

## 2020-10-20 PROCEDURE — 97016 VASOPNEUMATIC DEVICE THERAPY: CPT | Performed by: SPECIALIST/TECHNOLOGIST

## 2020-10-20 NOTE — FLOWSHEET NOTE
[facility]    Physical Therapy Treatment Note/ Progress Report:     Date:  10/20/2020    Patient Name:  Oj Saunders    :  1975  MRN: [id number]  Medical/Treatment Diagnosis Information:  · Diagnosis: Z47.89 Encounter for orthopedic aftercare; M75.111 Incomplete R RTC tear; M25.511 R shoulder pain  · Treatment Diagnosis: Z47.89 Encounter for orthopedic aftercare; M75.111 Incomplete R RTC tear; M25.511 R shoulder pain  Insurance/Certification information:  PT Insurance Information: [facility] [place] - 61 PT visits/0 used  Physician Information:  Referring Practitioner: Nasra Grimaldo  Plan of care signed (Y/N):     Date of Patient follow up with Physician: 10/28     Progress Report: []  Yes  [x]  No     Functional Scale: 98% disability - Kit Brooke   Date:  20    Date Range for reporting period:  Beginnin20  Ending:  NA    Progress report due (10 Rx/or 30 days whichever is less): 93//81     Recertification due (POC duration/ or 90 days whichever is less): 10/29/20     Visit # Insurance Allowable Auth Needed   6 [facility] [place] - 60 PT visits/0 used []Yes    [x]No     Pain level:  0/10 at start of session; 4-5/10 with movement    6-7 worst with too much activity     SUBJECTIVE: 15 minutes late today. 5 weeks  S/p this thurs. Pt reports that her RT. shoulder is more sore across the top of the shoulder. Was out of town for the weekend but still was able to keep up with her HEP. (2x/ day) still able to ice her shoulder which also seems to help her symptoms. Able to sleep in bed with pillows under her RT elbow while sleeping on her left. Not currently sleeping in sling. Not in sling at home but out in public she will use it  OBJECTIVE:    Observation:    10/1/20 Rt bicep with mild ecchymosis present. Pt observed keeping arm guarded against her side even when the sling is off.    10/20/20  Rt shoulder observed  in guarded sense, posture, motor skill, proprioception and motor activation to allow for proper function of scapular, scapulothoracic and UE control with self care, carrying, lifting, driving/computer work. Home Exercise Program:    [] (38331) Reviewed/Progressed HEP activities related to strengthening, flexibility, endurance, ROM of scapular, scapulothoracic and UE control with self care, reaching, carrying, lifting, house/yardwork, driving/computer work  [] (93396) Reviewed/Progressed HEP activities related to improving balance, coordination, kinesthetic sense, posture, motor skill, proprioception of scapular, scapulothoracic and UE control with self care, reaching, carrying, lifting, house/yardwork, driving/computer work      Manual Treatments:  PROM / STM / Oscillations-Mobs:  G-I, II, III, IV (PA's, Inf., Post.)  [x] (22440) Provided manual therapy to mobilize soft tissue/joints of cervical/CT, scapular GHJ and UE for the purpose of modulating pain, promoting relaxation,  increasing ROM, reducing/eliminating soft tissue swelling/inflammation/restriction, improving soft tissue extensibility and allowing for proper ROM for normal function with self care, reaching, carrying, lifting, house/yardwork, driving/computer work    Modalities:  Game Ready to R shoulder for pain/inflammation/edema - 15 minutes. Charges:  Timed Code Treatment Minutes: 35   Total Treatment Minutes: 50       [] EVAL (LOW) 86154 (typically 20 minutes face-to-face)  [] EVAL (MOD) 33230 (typically 30 minutes face-to-face)  [] EVAL (HIGH) 50876 (typically 45 minutes face-to-face)  [] RE-EVAL     [x] FO(99325) x 1    [] IONTO (74500)  [] NMR (21145) x     [x] VASO (70396)  [x] Manual (60397) x 2    [] Other:  [] TA (89418)x     [] Mech Traction (85250)  [] ES(attended) (36651)     [] ES (un) (90567):      If BWC Please Indicate Time In/Out  CPT Code Time in Time out                                     GOALS:  Patient stated goal: Regain full use of R arm.  [x] Progressing: [] Met: [] Not Met: [] Adjusted    Therapist goals for Patient:   Short Term Goals: To be achieved in: 2 weeks  1. Independent in HEP and progression per patient tolerance, in order to prevent re-injury. [x] Progressing: [] Met: [] Not Met: [] Adjusted  2. Patient will have a decrease in pain to facilitate improvement in movement, function, and ADLs as indicated by Functional Deficits. [x] Progressing: [] Met: [] Not Met: [] Adjusted    Long Term Goals: To be achieved in: 4 weeks  1. Disability index score of 20% or less for the DASH to assist with reaching prior level of function. [x] Progressing: [] Met: [] Not Met: [] Adjusted  2. Patient will demonstrate increased R shoulder AROM to allow for proper joint functioning as indicated by patients Functional Deficits. [x] Progressing: [] Met: [] Not Met: [] Adjusted  3. Patient will demonstrate an increase in R shoulder strength to within 5# HHD compared to uninvolved L UE to allow for proper functional mobility as indicated by patients Functional Deficits. [x] Progressing: [] Met: [] Not Met: [] Adjusted  4. Patient will be able to perform all ADLs, self care tasks, and household chores using R UE without increased symptoms or restriction. [x] Progressing: [] Met: [] Not Met: [] Adjusted  5. Patient will be able to perform all work tasks using R UE without increased symptoms or restriction. [x] Progressing: [] Met: [] Not Met: [] Adjusted     Progression Towards Functional goals:  [x] Patient is progressing as expected towards functional goals listed. [] Progression is slowed due to complexities listed. [] Progression has been slowed due to co-morbidities. [] Plan just implemented, too soon to assess goals progression  [] Other:     ASSESSMENT:     Continued focus on gentle R shoulder ROM via manual techniques and self stretching and on gentle periscapular activation.   Patient tolerated more aggressive stretching and passive range today both with manual techniques and self stretching. Pt continues to be guarded with ER PROM but is struggling to stretch appropriately with HEP and walks around with arm guarded in flexion position out of sling. Return to Play: (if applicable)    []  Stage 1: Intro to Strength   []  Stage 2: Dynamic Strength and Intro to Plyometrics   []  Stage 3: Advanced Plyometrics and Intro to Throwing   []  Stage 4: Sport specific Training/Return to Sport     []  Ready to Return to Play, [employer]   []  Not Ready for Return to Sports   Comments:      Treatment/Activity Tolerance:  [x] Patient tolerated treatment well [] Patient limited by fatique  [] Patient limited by pain  [] Patient limited by other medical complications  [] Other:     Overall Progression Towards Functional goals/ Treatment Progress Update:  [x] Patient is progressing as expected towards functional goals listed. [] Progression is slowed due to complexities/Impairments listed. [] Progression has been slowed due to co-morbidities. [] Plan just implemented, too soon to assess goals progression <30days   [] Goals require adjustment due to lack of progress  [] Patient is not progressing as expected and requires additional follow up with physician  [] Other    Prognosis for POC: [x] Good [] Fair  [] Poor    Patient requires continued skilled intervention: [x] Yes  [] No      PLAN: Pt. Advised to increase Rt shoulder/ elbow mobility out of sling around the house due to guarded nature to decrease shoulder stiffness and improve overall mobility. Advised pt to schedule 3x week to get ROM going.    [x] Continue per plan of care [] Alter current plan (see comments)  [] Plan of care initiated [] Hold pending MD visit [] Discharge    Electronically signed by: Heath Kent, [facility], [id number]       Note: If patient does not return for scheduled/recommended follow up visits, this note will serve as a discharge from care along with the most recent update on progress.

## 2020-10-22 ENCOUNTER — HOSPITAL ENCOUNTER (OUTPATIENT)
Dept: PHYSICAL THERAPY | Age: 45
Setting detail: THERAPIES SERIES
Discharge: HOME | End: 2020-10-22
Payer: COMMERCIAL

## 2020-10-22 PROCEDURE — 97140 MANUAL THERAPY 1/> REGIONS: CPT | Performed by: SPECIALIST/TECHNOLOGIST

## 2020-10-22 PROCEDURE — 97110 THERAPEUTIC EXERCISES: CPT | Performed by: SPECIALIST/TECHNOLOGIST

## 2020-10-22 PROCEDURE — 97016 VASOPNEUMATIC DEVICE THERAPY: CPT | Performed by: SPECIALIST/TECHNOLOGIST

## 2020-10-22 NOTE — FLOWSHEET NOTE
[facility]    Physical Therapy Treatment Note/ Progress Report:     Date:  10/22/2020    Patient Name:  Mary Perla    :  1975  MRN: [id number]  Medical/Treatment Diagnosis Information:  · Diagnosis: Z47.89 Encounter for orthopedic aftercare; M75.111 Incomplete R RTC tear; M25.511 R shoulder pain  · Treatment Diagnosis: Z47.89 Encounter for orthopedic aftercare; M75.111 Incomplete R RTC tear; M25.511 R shoulder pain  Insurance/Certification information:  PT Insurance Information: [facility] [place] - 61 PT visits/0 used  Physician Information:  Referring Practitioner: Rudolph Gomez  Plan of care signed (Y/N):     Date of Patient follow up with Physician: 10/28     Progress Report: []  Yes  [x]  No     Functional Scale: 98% disability - Edith Smith   Date:  20    Date Range for reporting period:  Beginnin20  Ending:  NA    Progress report due (10 Rx/or 30 days whichever is less):      Recertification due (POC duration/ or 90 days whichever is less): 10/29/20     Visit # Insurance Allowable Auth Needed    7   10/22 [facility] [place] - 61 PT visits/0 used []Yes    [x]No     Pain level:  0/10 at start of session; 4-5/10 with movement    6-7 worst with too much activity     SUBJECTIVE: 15' minutes late again today. 5 weeks  S/P today. Pt reports that her Rt shoulder is making progress and getting little better every day. Is very apprehensive driving her car with her Rt shoulder so she drives slower. Not in sling at home but out in public she will use it  OBJECTIVE:    Observation:    10/1/20 Rt bicep with mild ecchymosis present. Pt observed keeping arm guarded against her side even when the sling is off.  10/20/20  Rt shoulder observed  in guarded state in supine position while on wedge   Test measurements:       RESTRICTIONS/PRECAUTIONS: Situs inversus. Heterotaxy syndrome. Type II diabetes. High BP.  Previous R & L knee scopes. [place] barger tear on esophagus. R shoulder scope, RTC repair & augmentation, SAD, biceps tenodesis 9/17/20. Exercises/Interventions:   Therapeutic Exercise  Min:  20' Wt / Resistance Sets/sec Reps Notes   Pendulums fwd/bwd, side/side, circles   30 each direction    Pulleys scaption   2 min    Table slides flexion/scaption   10 each direction    Supine/seated cane ER  10-15s 10x    Scap retractions - seated  5s 20    TB rows  Green  2 10x    Isometrics flex / ER/ IR  10\" 10x Added ER/IR 10/22   Supine cane flex  10\" 10x                  Manual Intervention  Min:  15'       R shoulder PROM - within surgical restrictions   15 min                                              NMR Re-education  Min:  0                                                                   Therapeutic Exercise and NMR EXR  [x] (95484) Provided verbal/tactile cueing for activities related to strengthening, flexibility, endurance, ROM  for improvements in scapular, scapulothoracic and UE control with self care, reaching, carrying, lifting, house/yardwork, driving/computer work.    [] (71478) Provided verbal/tactile cueing for activities related to improving balance, coordination, kinesthetic sense, posture, motor skill, proprioception  to assist with  scapular, scapulothoracic and UE control with self care, reaching, carrying, lifting, house/yardwork, driving/computer work. Therapeutic Activities:  Discussed HEP regarding increasing frequency of ROM and increasing  Frequency to 3x/ day and not allowing 10-12 hours in between HEP sessions.  Pt advised we need to increase PT frequency to 3x week until motion is on track with DM hx.   [x] (65095 or 13343) Provided verbal/tactile cueing for activities related to improving balance, coordination, kinesthetic sense, posture, motor skill, proprioception and motor activation to allow for proper function of scapular, scapulothoracic and UE control with self care, carrying, lifting, driving/computer work. Home Exercise Program:    [] (04145) Reviewed/Progressed HEP activities related to strengthening, flexibility, endurance, ROM of scapular, scapulothoracic and UE control with self care, reaching, carrying, lifting, house/yardwork, driving/computer work  [] (04974) Reviewed/Progressed HEP activities related to improving balance, coordination, kinesthetic sense, posture, motor skill, proprioception of scapular, scapulothoracic and UE control with self care, reaching, carrying, lifting, house/yardwork, driving/computer work      Manual Treatments:  PROM / STM / Oscillations-Mobs:  G-I, II, III, IV (PA's, Inf., Post.)  [x] (87849) Provided manual therapy to mobilize soft tissue/joints of cervical/CT, scapular GHJ and UE for the purpose of modulating pain, promoting relaxation,  increasing ROM, reducing/eliminating soft tissue swelling/inflammation/restriction, improving soft tissue extensibility and allowing for proper ROM for normal function with self care, reaching, carrying, lifting, house/yardwork, driving/computer work    Modalities:  Game Ready to R shoulder for pain/inflammation/edema - 15 minutes. Charges:  Timed Code Treatment Minutes: 45   Total Treatment Minutes: 60       [] EVAL (LOW) 29845 (typically 20 minutes face-to-face)  [] EVAL (MOD) 12529 (typically 30 minutes face-to-face)  [] EVAL (HIGH) 62213 (typically 45 minutes face-to-face)  [] RE-EVAL     [x] AA(90270) x 1    [] IONTO (54548)  [] NMR (79095) x     [x] VASO (78905)  [x] Manual (41078) x 2    [] Other:  [] TA (11582)x     [] [facility] (16934)  [] ES(attended) (75768)     [] ES (un) (89662): If BW Please Indicate Time In/Out  CPT Code Time in Time out                                     GOALS:  Patient stated goal: Regain full use of R arm. [x] Progressing: [] Met: [] Not Met: [] Adjusted    Therapist goals for Patient:   Short Term Goals: To be achieved in: 2 weeks  1.  Independent in HEP and progression per patient tolerance, in order to prevent re-injury. [x] Progressing: [] Met: [] Not Met: [] Adjusted  2. Patient will have a decrease in pain to facilitate improvement in movement, function, and ADLs as indicated by Functional Deficits. [x] Progressing: [] Met: [] Not Met: [] Adjusted    Long Term Goals: To be achieved in: 4 weeks  1. Disability index score of 20% or less for the DASH to assist with reaching prior level of function. [x] Progressing: [] Met: [] Not Met: [] Adjusted  2. Patient will demonstrate increased R shoulder AROM to allow for proper joint functioning as indicated by patients Functional Deficits. [x] Progressing: [] Met: [] Not Met: [] Adjusted  3. Patient will demonstrate an increase in R shoulder strength to within 5# HHD compared to uninvolved L UE to allow for proper functional mobility as indicated by patients Functional Deficits. [x] Progressing: [] Met: [] Not Met: [] Adjusted  4. Patient will be able to perform all ADLs, self care tasks, and household chores using R UE without increased symptoms or restriction. [x] Progressing: [] Met: [] Not Met: [] Adjusted  5. Patient will be able to perform all work tasks using R UE without increased symptoms or restriction. [x] Progressing: [] Met: [] Not Met: [] Adjusted     Progression Towards Functional goals:  [x] Patient is progressing as expected towards functional goals listed. [] Progression is slowed due to complexities listed. [] Progression has been slowed due to co-morbidities. [] Plan just implemented, too soon to assess goals progression  [] Other:     ASSESSMENT:     Continued focus on gentle R shoulder ROM via manual techniques and self stretching and on gentle periscapular activation. Patient tolerated more aggressive stretching and passive range today both with manual techniques and self stretching.  PT PROM much improved with less guarding with  Pt continues to be guarded with ER PROM with is improved overall with less capsular stiffness today. Tolerated isometrics with ER and IR with out  Reports any pain. Return to Play: (if applicable)    []  Stage 1: Intro to Strength   []  Stage 2: Dynamic Strength and Intro to Plyometrics   []  Stage 3: Advanced Plyometrics and Intro to Throwing   []  Stage 4: Sport specific Training/Return to Sport     []  Ready to Return to Play, [employer]   []  Not Ready for Return to Sports   Comments:      Treatment/Activity Tolerance:  [x] Patient tolerated treatment well [] Patient limited by fatique  [] Patient limited by pain  [] Patient limited by other medical complications  [] Other:     Overall Progression Towards Functional goals/ Treatment Progress Update:  [x] Patient is progressing as expected towards functional goals listed. [] Progression is slowed due to complexities/Impairments listed. [] Progression has been slowed due to co-morbidities. [] Plan just implemented, too soon to assess goals progression <30days   [] Goals require adjustment due to lack of progress  [] Patient is not progressing as expected and requires additional follow up with physician  [] Other    Prognosis for POC: [x] Good [] Fair  [] Poor    Patient requires continued skilled intervention: [x] Yes  [] No      PLAN: Pt. Advised to increase Rt shoulder/ elbow mobility out of sling around the house due to guarded nature to decrease shoulder stiffness and improve overall mobility. Advised pt to schedule 3x week to get ROM going. Decrease frequency with 2x/ week? [x] Continue per plan of care [] Alter current plan (see comments)  [] Plan of care initiated [] Hold pending MD visit [] Discharge    Electronically signed by: Aditi Couch, PTA, [id number]       Note: If patient does not return for scheduled/recommended follow up visits, this note will serve as a discharge from care along with the most recent update on progress.

## 2020-10-23 ENCOUNTER — HOSPITAL ENCOUNTER (OUTPATIENT)
Dept: PHYSICAL THERAPY | Age: 45
Setting detail: THERAPIES SERIES
Discharge: HOME | End: 2020-10-23
Payer: COMMERCIAL

## 2020-10-23 PROCEDURE — 97016 VASOPNEUMATIC DEVICE THERAPY: CPT | Performed by: SPECIALIST/TECHNOLOGIST

## 2020-10-23 PROCEDURE — 97140 MANUAL THERAPY 1/> REGIONS: CPT | Performed by: SPECIALIST/TECHNOLOGIST

## 2020-10-27 ENCOUNTER — HOSPITAL ENCOUNTER (OUTPATIENT)
Dept: PHYSICAL THERAPY | Age: 45
Setting detail: THERAPIES SERIES
Discharge: HOME | End: 2020-10-27
Payer: COMMERCIAL

## 2020-10-27 NOTE — PROGRESS NOTES
Physical Therapy    [place] [id number], [facility]    Physical Therapy  Cancellation/No-show Note  Patient Name:  Ana Torres  :  1975   Date:  10/27/2020  Cancelled visits to date: 1  No-shows to date: 0    For today's appointment patient:  [x]  Cancelled  []  Rescheduled appointment  []  No-show     Reason given by patient:  [x]  Patient ill  []  Conflicting appointment  []  No transportation    []  Conflict with work  []  No reason given  []  Other:     Comments:      Phone call information:   []  Phone call made today to patient at _ time at number provided:      []  Patient answered, conversation as follows:    []  Patient did not answer, message left as follows:  []  Phone call not made today  [x]  Phone call not needed - pt contacted us to cancel and provided reason for cancellation.      Electronically signed by:  Luc Hurtado [place], [id number]

## 2020-10-28 ENCOUNTER — HOSPITAL ENCOUNTER (OUTPATIENT)
Dept: PHYSICAL THERAPY | Age: 45
Setting detail: THERAPIES SERIES
Discharge: HOME | End: 2020-10-28
Payer: COMMERCIAL

## 2020-10-28 PROCEDURE — 97110 THERAPEUTIC EXERCISES: CPT | Performed by: SPECIALIST/TECHNOLOGIST

## 2020-10-28 PROCEDURE — G0283 ELEC STIM OTHER THAN WOUND: HCPCS | Performed by: SPECIALIST/TECHNOLOGIST

## 2020-10-28 PROCEDURE — 97140 MANUAL THERAPY 1/> REGIONS: CPT | Performed by: SPECIALIST/TECHNOLOGIST

## 2020-10-28 NOTE — FLOWSHEET NOTE
[facility]    Physical Therapy Treatment Note/ Progress Report:     Date:  10/28/2020    Patient Name:  Huong Rich    :  1975  MRN: [id number]  Medical/Treatment Diagnosis Information:  · Diagnosis: Z47.89 Encounter for orthopedic aftercare; M75.111 Incomplete R RTC tear; M25.511 R shoulder pain  · Treatment Diagnosis: Z47.89 Encounter for orthopedic aftercare; M75.111 Incomplete R RTC tear; M25.511 R shoulder pain  Insurance/Certification information:  PT Insurance Information: [facility] [place] - 61 PT visits/0 used  Physician Information:  Referring Practitioner: Rene Taveras  Plan of care signed (Y/N):     Date of Patient follow up with Physician: 10/28     Progress Report: []  Yes  [x]  No     Functional Scale: 98% disability - Mora Kaufman   Date:  20    Date Range for reporting period:  Beginnin20  Ending:  NA    Progress report due (10 Rx/or 30 days whichever is less):      Recertification due (POC duration/ or 90 days whichever is less): 10/29/20     Visit # Insurance Allowable Auth Needed    9  10/28 [employer] [place] - 61 PT visits/0 used []Yes    [x]No     Pain level:  0/10 at start of session; 4-5/10 with movement    6-7 worst with too much activity     SUBJECTIVE:  10' minutes late today. 5 weeks  S/P  Feels her Rt shoulder is much worse with constant aching and soreness throughout the entire Rt arm. Admits to not doing much since last session b/c her arm hurts more most of the time. Pt admits to simulating pulleys motion by repetitiously reaching over head to focus on stretching shoulder. Pt admits that she is eager to return to cardio for wt loss but is inconsistent with maintaining shoulder rehabilitation guidelines. OBJECTIVE:    Observation:    10/1/20 Rt bicep with mild ecchymosis present. Pt observed keeping arm guarded against her side even when the sling is off.    10/20/20  Rt shoulder observed  in guarded state in supine position while on wedge   Test measurements:       RESTRICTIONS/PRECAUTIONS: Situs inversus. Heterotaxy syndrome. Type II diabetes. High BP. Previous R & L knee scopes. [place] barger tear on esophagus. R shoulder scope, RTC repair & augmentation, SAD, biceps tenodesis 9/17/20. Exercises/Interventions:   Therapeutic Exercise  Min:  20' Wt / Resistance Sets/sec Reps Notes   Pendulums fwd/bwd, side/side, circles   30 each direction    Pulleys scaption/ flexion   2 min    Table slides flexion/scaption  10 each direction   Supine/seated cane ER  10-15s 10x    Scap retractions - seated  5s 20    TB rows  Green  2 10x    Isometrics flex / ER/ IR HEP 10\" 10x Added ER/IR 10/22   Supine cane flex  10\" 10x    Supine cane press   2 10x           Manual Intervention  Min:  23'       R shoulder PROM - within surgical restrictions   23 min GH  Grade 1/2 mobilizations with                                              NMR Re-education  Min:  0                                          TA  Pt education 3'   Pt advised to avoid actively reaching OH due to RTC repair with bicep                     Therapeutic Exercise and NMR EXR  [x] (78109) Provided verbal/tactile cueing for activities related to strengthening, flexibility, endurance, ROM  for improvements in scapular, scapulothoracic and UE control with self care, reaching, carrying, lifting, house/yardwork, driving/computer work.    [] (55181) Provided verbal/tactile cueing for activities related to improving balance, coordination, kinesthetic sense, posture, motor skill, proprioception  to assist with  scapular, scapulothoracic and UE control with self care, reaching, carrying, lifting, house/yardwork, driving/computer work. Therapeutic Activities:  Discussed HEP regarding increasing frequency of ROM and increasing  Frequency to 3x/ day and not allowing 10-12 hours in between HEP sessions.  Pt advised we need to increase PT frequency to 3x week until motion is on track with DM hx.   [x] (34396 or 16687) Provided verbal/tactile cueing for activities related to improving balance, coordination, kinesthetic sense, posture, motor skill, proprioception and motor activation to allow for proper function of scapular, scapulothoracic and UE control with self care, carrying, lifting, driving/computer work.      Home Exercise Program:    [] (30250) Reviewed/Progressed HEP activities related to strengthening, flexibility, endurance, ROM of scapular, scapulothoracic and UE control with self care, reaching, carrying, lifting, house/yardwork, driving/computer work  [] (44560) Reviewed/Progressed HEP activities related to improving balance, coordination, kinesthetic sense, posture, motor skill, proprioception of scapular, scapulothoracic and UE control with self care, reaching, carrying, lifting, house/yardwork, driving/computer work      Manual Treatments:  PROM / STM / Oscillations-Mobs:  G-I, II, III, IV (PA's, Inf., Post.)  [x] (68288) Provided manual therapy to mobilize soft tissue/joints of cervical/CT, scapular GHJ and UE for the purpose of modulating pain, promoting relaxation,  increasing ROM, reducing/eliminating soft tissue swelling/inflammation/restriction, improving soft tissue extensibility and allowing for proper ROM for normal function with self care, reaching, carrying, lifting, house/yardwork, driving/computer work    Modalities:  Game Ready to R shoulder for pain/inflammation/edema - 15 minutes W/PM  Charges:  Timed Code Treatment Minutes: 40'   Total Treatment Minutes: 54'       [] EVAL (LOW) 44110 (typically 20 minutes face-to-face)  [] EVAL (MOD) 82508 (typically 30 minutes face-to-face)  [] EVAL (HIGH) 95003 (typically 45 minutes face-to-face)  [] RE-EVAL     [x] PJ(77555) x   1  [] IONTO (01218)  [] NMR (19124) x     [x] VASO (65741)  [x] Manual (19753) x 2    [] Other:  [] TA (31835)x     [] Mech Traction (88343)  [] ES(attended) (08613) [x] ES (un) (50703): If BWC Please Indicate Time In/Out  CPT Code Time in Time out                                     GOALS:  Patient stated goal: Regain full use of R arm. [x] Progressing: [] Met: [] Not Met: [] Adjusted    Therapist goals for Patient:   Short Term Goals: To be achieved in: 2 weeks  1. Independent in HEP and progression per patient tolerance, in order to prevent re-injury. [x] Progressing: [] Met: [] Not Met: [] Adjusted  2. Patient will have a decrease in pain to facilitate improvement in movement, function, and ADLs as indicated by Functional Deficits. [x] Progressing: [] Met: [] Not Met: [] Adjusted    Long Term Goals: To be achieved in: 4 weeks  1. Disability index score of 20% or less for the DASH to assist with reaching prior level of function. [x] Progressing: [] Met: [] Not Met: [] Adjusted  2. Patient will demonstrate increased R shoulder AROM to allow for proper joint functioning as indicated by patients Functional Deficits. [x] Progressing: [] Met: [] Not Met: [] Adjusted  3. Patient will demonstrate an increase in R shoulder strength to within 5# HHD compared to uninvolved L UE to allow for proper functional mobility as indicated by patients Functional Deficits. [x] Progressing: [] Met: [] Not Met: [] Adjusted  4. Patient will be able to perform all ADLs, self care tasks, and household chores using R UE without increased symptoms or restriction. [x] Progressing: [] Met: [] Not Met: [] Adjusted  5. Patient will be able to perform all work tasks using R UE without increased symptoms or restriction. [x] Progressing: [] Met: [] Not Met: [] Adjusted     Progression Towards Functional goals:  [x] Patient is progressing as expected towards functional goals listed. [] Progression is slowed due to complexities listed. [] Progression has been slowed due to co-morbidities.   [] Plan just implemented, too soon to assess goals progression  [] Other:     ASSESSMENT: Continued focus on R shoulder ROM via manual techniques and self stretching and on gentle periscapular activation. Pt likes to carry her arm in elbow flexion and tight against her side with ambulation in the clinic. Rt shoulder due to bicep/ anterior arm stiffness and discomfort today compared to yesterday. Pt continues to be guarded with ER PROM  And Rt shoulder flexion with is improved overall with more capsular stiffness today. Pt denied pain with supine cane flexion and cane press. Reports pain with PROM but patient needs to progress ROM to do diabetic history  and lack of intensity with HEP and inconsistency with efforts while in PT session. Return to Play: (if applicable)    []  Stage 1: Intro to Strength   []  Stage 2: Dynamic Strength and Intro to Plyometrics   []  Stage 3: Advanced Plyometrics and Intro to Throwing   []  Stage 4: Sport specific Training/Return to Sport     []  Ready to Return to Play, [employer]   []  Not Ready for Return to Sports   Comments:      Treatment/Activity Tolerance:  [x] Patient tolerated treatment well [] Patient limited by fatique  [x] Patient limited by pain with Flexion PROM and ER  [] Patient limited by other medical complications  [] Other:     Overall Progression Towards Functional goals/ Treatment Progress Update:  [x] Patient is progressing as expected towards functional goals listed. [] Progression is slowed due to complexities/Impairments listed. [] Progression has been slowed due to co-morbidities.   [] Plan just implemented, too soon to assess goals progression <30days   [] Goals require adjustment due to lack of progress  [] Patient is not progressing as expected and requires additional follow up with physician  [] Other    Prognosis for POC: [x] Good [] Fair  [] Poor    Patient requires continued skilled intervention: [x] Yes  [] No      PLAN: Pt. Advised to increase Rt shoulder/ elbow mobility out of sling around the house due to guarded nature to decrease shoulder stiffness and improve overall mobility. Advised pt to schedule 3x week to get ROM moving and increase compliance to HEP with ROM mobility. [x] Continue per plan of care [] Alter current plan (see comments)  [] Plan of care initiated [] Hold pending MD visit [] Discharge    Electronically signed by: Guanaco Patrick, PTA, [id number]       Note: If patient does not return for scheduled/recommended follow up visits, this note will serve as a discharge from care along with the most recent update on progress.

## 2020-10-29 ENCOUNTER — APPOINTMENT (OUTPATIENT)
Dept: PHYSICAL THERAPY | Age: 45
End: 2020-10-29
Payer: COMMERCIAL

## 2020-10-30 ENCOUNTER — HOSPITAL ENCOUNTER (OUTPATIENT)
Dept: PHYSICAL THERAPY | Age: 45
Setting detail: THERAPIES SERIES
Discharge: HOME | End: 2020-10-30
Payer: COMMERCIAL

## 2020-10-30 PROCEDURE — 97140 MANUAL THERAPY 1/> REGIONS: CPT | Performed by: SPECIALIST/TECHNOLOGIST

## 2020-10-30 PROCEDURE — 97110 THERAPEUTIC EXERCISES: CPT | Performed by: SPECIALIST/TECHNOLOGIST

## 2020-10-30 PROCEDURE — G0283 ELEC STIM OTHER THAN WOUND: HCPCS | Performed by: SPECIALIST/TECHNOLOGIST

## 2020-10-30 NOTE — FLOWSHEET NOTE
[facility]    Physical Therapy Treatment Note/ Progress Report:     Date:  10/30/2020    Patient Name:  Ariadna Alston    :  1975  MRN: [id number]  Medical/Treatment Diagnosis Information:  · Diagnosis: Z47.89 Encounter for orthopedic aftercare; M75.111 Incomplete R RTC tear; M25.511 R shoulder pain  · Treatment Diagnosis: Z47.89 Encounter for orthopedic aftercare; M75.111 Incomplete R RTC tear; M25.511 R shoulder pain  Insurance/Certification information:  PT Insurance Information: [employer] [place] - 61 PT visits/0 used  Physician Information:  Referring Practitioner: Rene Villafana  Plan of care signed (Y/N):     Date of Patient follow up with Physician: 10/28     Progress Report: []  Yes  [x]  No     Functional Scale: 98% disability - Merril Fredo   Date:  20    Date Range for reporting period:  Beginnin20  Ending:  NA    Progress report due (10 Rx/or 30 days whichever is less):      Recertification due (POC duration/ or 90 days whichever is less): 10/29/20     Visit # Insurance Allowable Auth Needed    10  10/30 [facility] [place] - 61 PT visits/0 used []Yes    [x]No     Pain level:  5/10 at start of session and worsening with treatment session     SUBJECTIVE:  15' minutes late today. 6 weeks yesterday. S/P  Reports having some increased soreness and pain across the Rt shoulder since last session. Attempting to maintain HEP as able and admits to still using ice to help symptoms. Frustrated by not being able to work and her disability not being good along with her levels of pain with other issues presently. Has stopped reaching overhead with her HEP and is only wearing the sling to come to therapy or when out. OBJECTIVE:    Observation:    10/1/20 Rt bicep with mild ecchymosis present. Pt observed keeping arm guarded against her side even when the sling is off.    10/20/20  Rt shoulder observed  in guarded state in supine position while on wedge   Test measurements:       RESTRICTIONS/PRECAUTIONS: Situs inversus. Heterotaxy syndrome. Type II diabetes. High BP. Previous R & L knee scopes. [place] barger tear on esophagus. R shoulder scope, RTC repair & augmentation, SAD, biceps tenodesis 9/17/20. Exercises/Interventions:   Therapeutic Exercise  Min:  20' Wt / Resistance Sets/sec Reps Notes   Pendulums fwd/bwd, side/side, circles   30 each direction    Pulleys scaption/ flexion   2 min    Table slides flexion/scaption  10 each direction   Supine cane ER  10-15s 10x    Scap retractions - seated  5s 20    TB rows / ext Green  2 10x    Isometrics flex / ER/ IR HEP 10\" 10x Added ER/IR 10/22   Supine cane flex  10\" 10x    Supine cane press   2 10x    SL  ER 0# 2 10x    Prone rows/ Extensions  2 10x    Manual Intervention  Min:  23'       R shoulder PROM    23 min GH  Grade 1/2 mobilizations with                                              NMR Re-education  Min:  0                                                                   Therapeutic Exercise and NMR EXR  [x] (39307) Provided verbal/tactile cueing for activities related to strengthening, flexibility, endurance, ROM  for improvements in scapular, scapulothoracic and UE control with self care, reaching, carrying, lifting, house/yardwork, driving/computer work.    [] (60878) Provided verbal/tactile cueing for activities related to improving balance, coordination, kinesthetic sense, posture, motor skill, proprioception  to assist with  scapular, scapulothoracic and UE control with self care, reaching, carrying, lifting, house/yardwork, driving/computer work. Therapeutic Activities:  Discussed HEP regarding increasing frequency of ROM and increasing  Frequency to 3x/ day and not allowing 10-12 hours in between HEP sessions.  Pt advised we need to increase PT frequency to 3x week until motion is on track with DM hx.   [x] (73123 or 32195) Provided verbal/tactile cueing for activation. Rt shoulder due to bicep/ anterior arm stiffness and discomfort today compared to yesterday. Pt denied pain with prone rows and extensions but needed cues for proper exercise performance. Pt tolerated TB rows and extensions and demonstrated improved postural mechanics. Reports pain with PROM but patient needs to progress ROM to do diabetic history  and lack of intensity with HEP and inconsistency with efforts while in PT session. Return to Play: (if applicable)    []  Stage 1: Intro to Strength   []  Stage 2: Dynamic Strength and Intro to Plyometrics   []  Stage 3: Advanced Plyometrics and Intro to Throwing   []  Stage 4: Sport specific Training/Return to Sport     []  Ready to Return to Play, [employer]   []  Not Ready for Return to Sports   Comments:      Treatment/Activity Tolerance:  [x] Patient tolerated treatment well [x] Patient limited by fatique  [] Patient limited by pain    [] Patient limited by other medical complications   [] Other:     Overall Progression Towards Functional goals/ Treatment Progress Update:  [x] Patient is progressing as expected towards functional goals listed. [] Progression is slowed due to complexities/Impairments listed. [] Progression has been slowed due to co-morbidities. [] Plan just implemented, too soon to assess goals progression <30days   [] Goals require adjustment due to lack of progress  [] Patient is not progressing as expected and requires additional follow up with physician  [] Other    Prognosis for POC: [x] Good [] Fair  [] Poor    Patient requires continued skilled intervention: [x] Yes  [] No      PLAN: Pt. Advised to increase Rt shoulder/ elbow mobility out of sling around the house due to guarded nature to decrease shoulder stiffness and improve overall mobility. Advised pt to schedule 3x week to get ROM moving and increase compliance to HEP with ROM mobility.      [x] Continue per plan of care [] Alter current plan (see

## 2020-11-02 ENCOUNTER — HOSPITAL ENCOUNTER (OUTPATIENT)
Dept: PHYSICAL THERAPY | Age: 45
Setting detail: THERAPIES SERIES
End: 2020-11-02
Payer: COMMERCIAL

## 2020-11-04 ENCOUNTER — HOSPITAL ENCOUNTER (OUTPATIENT)
Dept: PHYSICAL THERAPY | Age: 45
Setting detail: THERAPIES SERIES
Discharge: HOME | End: 2020-11-04
Payer: COMMERCIAL

## 2020-11-04 ENCOUNTER — OFFICE VISIT (OUTPATIENT)
Dept: ORTHOPEDIC SURGERY | Age: 45
End: 2020-11-04

## 2020-11-04 PROCEDURE — 97140 MANUAL THERAPY 1/> REGIONS: CPT | Performed by: SPECIALIST/TECHNOLOGIST

## 2020-11-04 PROCEDURE — 99024 POSTOP FOLLOW-UP VISIT: CPT | Performed by: ORTHOPAEDIC SURGERY

## 2020-11-04 PROCEDURE — G0283 ELEC STIM OTHER THAN WOUND: HCPCS | Performed by: SPECIALIST/TECHNOLOGIST

## 2020-11-04 PROCEDURE — 97016 VASOPNEUMATIC DEVICE THERAPY: CPT | Performed by: SPECIALIST/TECHNOLOGIST

## 2020-11-04 PROCEDURE — 97110 THERAPEUTIC EXERCISES: CPT | Performed by: SPECIALIST/TECHNOLOGIST

## 2020-11-04 NOTE — PROGRESS NOTES
Chief Complaint    Shoulder Pain (right shoulder )      History of Present Illness:  Mary Perla is a 54 y.o. female. She is here today for follow-up for her right shoulder. Status post right shoulder rotator cuff repair. 6 weeks postoperative. Overall doing well at this time. Making progress with physical therapy. 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 Does not feel like she is ready return back to work at this time. Medical History:  Patient's medications, allergies, past medical, surgical, social and family histories were reviewed and updated as appropriate. Review of Systems:  Pertinent items are noted in HPI  Review of systems reviewed from Patient History Form dated on 11/4/20 and available in the patient's chart under the Media tab. Vital Signs: There were no vitals taken for this visit. General Exam:   Constitutional: Patient is adequately groomed with no evidence of malnutrition  DTRs: Deep tendon reflexes are intact  Mental Status: The patient is oriented to time, place and person. The patient's mood and affect are appropriate. Shoulder Examination:    Inspection: Surgical incisions well-healed about the right shoulder today    Palpation: No significant tenderness palpation about the shoulder today    Range of Motion: Active forward elevation is 90 degrees. Passively I get her to 130 degrees. External rotation is 50 degrees    Strength: Strength is 4 out of 5 with resisted external and internal rotation    Special Tests: Negative drop arm exam    Skin: There are no rashes, ulcerations or lesions. Additional Comments:       Additional Examinations:         Left Upper Extremity: Examination of the left upper extremity does not show any tenderness, deformity or injury. Range of motion is unremarkable. There is no gross instability. There are no rashes, ulcerations or lesions.   Strength and tone are normal.          Assessment : Status post right shoulder arthroscopy with rotator cuff repair    Impression:  Encounter Diagnosis   Name Primary?  S/P right rotator cuff repair Yes       Office Procedures:  No orders of the defined types were placed in this encounter. Treatment Plan: Overall doing well at this time. Going to continue with physical therapy twice a week to continue to work on range of motion and strengthening. I would like to see her back in clinic in 4 weeks. We will keep her off of work over the next 4 weeks.

## 2020-11-04 NOTE — FLOWSHEET NOTE
[facility]    Physical Therapy Treatment Note/ Progress Report:     Date:  2020    Patient Name:  Ariadna Alston    :  1975  MRN: [id number]  Medical/Treatment Diagnosis Information:  · Diagnosis: Z47.89 Encounter for orthopedic aftercare; M75.111 Incomplete R RTC tear; M25.511 R shoulder pain  · Treatment Diagnosis: Z47.89 Encounter for orthopedic aftercare; M75.111 Incomplete R RTC tear; M25.511 R shoulder pain  Insurance/Certification information:  PT Insurance Information: [employer] [place] - 61 PT visits/0 used  Physician Information:  Referring Practitioner: Rene Villafana  Plan of care signed (Y/N):     Date of Patient follow up with Physician: 10/28     Progress Report: []  Yes  [x]  No     Functional Scale: 98% disability - Merril Fredo   Date:  20    Date Range for reporting period:  Beginnin20  Ending:  NA    Progress report due (10 Rx/or 30 days whichever is less):      Recertification due (POC duration/ or 90 days whichever is less): 10/29/20     Visit # Insurance Allowable Auth Needed    11   [employer] [place] - 61 PT visits/0 used []Yes    [x]No     Pain level:  5/10 at start of session and worsening with treatment session     SUBJECTIVE:  15+' minutes late today. 7 weeks yesterday. S/P  Reports having some increased soreness and pain across the top of her Rt shoulder since last session. Has used some CPD oil on top of her Rt shoulder which has helped. Attempting to maintain HEP as able and admits to still using ice 2x/ wk to help symptoms. Majority of symptoms are across the anterior shoulder but admits she continues to guard her shoulder at her right side elevating her upper trap. OBJECTIVE:    Observation:    10/1/20 Rt bicep with mild ecchymosis present. Pt observed keeping arm guarded against her side even when the sling is off.    10/20/20  Rt shoulder observed  in guarded state in supine position while on wedge   11/4/20 RT shoulder not as guarded today with PROM. Flex  ~ 100  ABD ~ 105 ER 45 @90   IR 45  @90   Test measurements:       RESTRICTIONS/PRECAUTIONS: Situs inversus. Heterotaxy syndrome. Type II diabetes. High BP. Previous R & L knee scopes. [place] barger tear on esophagus. R shoulder scope, RTC repair & augmentation, SAD, biceps tenodesis 9/17/20. Exercises/Interventions:   Therapeutic Exercise  Min:  20' Wt / Resistance Sets/sec Reps Notes      Pulleys scaption/ flexion   4 min       Supine cane ER  10-15s 10x    Scap retractions - seated  5s 20    TB rows / Ext   ER/IR   High rows Green  Yellow  RED  2 10x    Isometrics flex / ER/ IR HEP 10\" 10x Added ER/IR 10/22   Supine cane flex  10\" 10x    Supine cane press   2 10x    SL  ER  7 ounces  2 10x    No money  yellow 2 10x    Prone rows/ Extensions 7ounces 2 10xea    Manual Intervention  Min:  13'       R shoulder PROM    23 min GH  Grade 1/2 mobilizations with                                              NMR Re-education  Min:  0       BB ER/IR  NPV                                                           Therapeutic Exercise and NMR EXR  [x] (09093) Provided verbal/tactile cueing for activities related to strengthening, flexibility, endurance, ROM  for improvements in scapular, scapulothoracic and UE control with self care, reaching, carrying, lifting, house/yardwork, driving/computer work.    [] (15267) Provided verbal/tactile cueing for activities related to improving balance, coordination, kinesthetic sense, posture, motor skill, proprioception  to assist with  scapular, scapulothoracic and UE control with self care, reaching, carrying, lifting, house/yardwork, driving/computer work. Therapeutic Activities:  Discussed HEP regarding increasing frequency of ROM and increasing  Frequency to 3x/ day and not allowing 10-12 hours in between HEP sessions.  Pt advised we need to increase PT frequency to 3x week until motion is on track with DM hx.   [x] (92010 or 85096) Provided verbal/tactile cueing for activities related to improving balance, coordination, kinesthetic sense, posture, motor skill, proprioception and motor activation to allow for proper function of scapular, scapulothoracic and UE control with self care, carrying, lifting, driving/computer work.      Home Exercise Program:    [] (91155) Reviewed/Progressed HEP activities related to strengthening, flexibility, endurance, ROM of scapular, scapulothoracic and UE control with self care, reaching, carrying, lifting, house/yardwork, driving/computer work  [] (22799) Reviewed/Progressed HEP activities related to improving balance, coordination, kinesthetic sense, posture, motor skill, proprioception of scapular, scapulothoracic and UE control with self care, reaching, carrying, lifting, house/yardwork, driving/computer work      Manual Treatments:  PROM / STM / Oscillations-Mobs:  G-I, II, III, IV (PA's, Inf., Post.)  [x] (64873) Provided manual therapy to mobilize soft tissue/joints of cervical/CT, scapular GHJ and UE for the purpose of modulating pain, promoting relaxation,  increasing ROM, reducing/eliminating soft tissue swelling/inflammation/restriction, improving soft tissue extensibility and allowing for proper ROM for normal function with self care, reaching, carrying, lifting, house/yardwork, driving/computer work    Modalities:  Game Ready to R shoulder for pain/inflammation/edema - 10 minutes W/PM  Charges:  Timed Code Treatment Minutes: 40'   Total Treatment Minutes: 50'       [] EVAL (LOW) 73218 (typically 20 minutes face-to-face)  [] EVAL (MOD) 17228 (typically 30 minutes face-to-face)  [] EVAL (HIGH) 96807 (typically 45 minutes face-to-face)  [] RE-EVAL     [x] QY(56570) x   1  [] IONTO (00632)  [] NMR (78311) x     [x] VASO (96298)  [x] Manual (19903) x 1    [] Other:  [] TA (78375)x     [] Mech Traction (72323)  [] ES(attended) (71027)     [x] ES (un) (58755): If [facility] Please Indicate Time In/Out  CPT Code Time in Time out                                     GOALS:  Patient stated goal: Regain full use of R arm. [x] Progressing: [] Met: [] Not Met: [] Adjusted    Therapist goals for Patient:   Short Term Goals: To be achieved in: 2 weeks  1. Independent in HEP and progression per patient tolerance, in order to prevent re-injury. [x] Progressing: [] Met: [] Not Met: [] Adjusted  2. Patient will have a decrease in pain to facilitate improvement in movement, function, and ADLs as indicated by Functional Deficits. [x] Progressing: [] Met: [] Not Met: [] Adjusted    Long Term Goals: To be achieved in: 4 weeks  1. Disability index score of 20% or less for the DASH to assist with reaching prior level of function. [x] Progressing: [] Met: [] Not Met: [] Adjusted  2. Patient will demonstrate increased R shoulder AROM to allow for proper joint functioning as indicated by patients Functional Deficits. [x] Progressing: [] Met: [] Not Met: [] Adjusted  3. Patient will demonstrate an increase in R shoulder strength to within 5# HHD compared to uninvolved L UE to allow for proper functional mobility as indicated by patients Functional Deficits. [x] Progressing: [] Met: [] Not Met: [] Adjusted  4. Patient will be able to perform all ADLs, self care tasks, and household chores using R UE without increased symptoms or restriction. [x] Progressing: [] Met: [] Not Met: [] Adjusted  5. Patient will be able to perform all work tasks using R UE without increased symptoms or restriction. [x] Progressing: [] Met: [] Not Met: [] Adjusted     Progression Towards Functional goals:  [x] Patient is progressing as expected towards functional goals listed. [] Progression is slowed due to complexities listed. [] Progression has been slowed due to co-morbidities.   [] Plan just implemented, too soon to assess goals progression  [] Other:     ASSESSMENT:     Pt had a good session on this moving and increase compliance to HEP with ROM mobility. [x] Continue per plan of care [] Alter current plan (see comments)  [] Plan of care initiated [] Hold pending MD visit [] Discharge    Electronically signed by: Gabriella Tirado, [facility], [id number]       Note: If patient does not return for scheduled/recommended follow up visits, this note will serve as a discharge from care along with the most recent update on progress.

## 2020-11-04 NOTE — LETTER
[address]  [address] [facility] [address]  Phone: [phone]  Fax: [phone]    Rodri Doshi MD        November 4, 2020     Patient: Karmen Almanza   YOB: 1975   Date of Visit: 11/4/2020       To Whom it May Concern:    Marquis Molina was seen in my clinic on 11/4/2020. She is status post right shoulder rotator cuff repair. We want to keep her off of work for another 4 weeks as I do not believe that she is ready return back to work at this time. I will reevaluate her in 4 weeks for return back to work  If you have any questions or concerns, please don't hesitate to call.     Sincerely,         Rodri Doshi MD

## 2020-11-06 ENCOUNTER — HOSPITAL ENCOUNTER (OUTPATIENT)
Dept: PHYSICAL THERAPY | Age: 45
Setting detail: THERAPIES SERIES
Discharge: HOME | End: 2020-11-06
Payer: COMMERCIAL

## 2020-11-06 NOTE — PROGRESS NOTES
Physical Therapy    [facility]    Physical Therapy  Cancellation/No-show Note  Patient Name:  Desire Cohen  :  1975   Date:  2020  Cancelled visits to date: 2  No-shows to date: 0    For today's appointment patient:  [x]  Cancelled  []  Rescheduled appointment  []  No-show     Reason given by patient:  []  Patient ill  []  Conflicting appointment  []  No transportation    []  Conflict with work  []  No reason given  [x]  Other:  Boyfriends family is in ICU   Comments:      Phone call information:   []  Phone call made today to patient at _ time at number provided:      []  Patient answered, conversation as follows:    []  Patient did not answer, message left as follows:  []  Phone call not made today  [x]  Phone call not needed - pt contacted us to cancel and provided reason for cancellation.      Electronically signed by:  Odalis Mallory, [id number]

## 2020-11-10 ENCOUNTER — TELEPHONE (OUTPATIENT)
Dept: ORTHOPEDIC SURGERY | Age: 45
End: 2020-11-10

## 2020-11-10 NOTE — TELEPHONE ENCOUNTER
FAXED (IN Epic) [facility]OR[facility] FOR 9/11/20 TO PRESENT ALONG WITH WORK NOTE TO MS. Stephen Castrejon @ [phone]

## 2020-11-11 ENCOUNTER — HOSPITAL ENCOUNTER (OUTPATIENT)
Dept: PHYSICAL THERAPY | Age: 45
Setting detail: THERAPIES SERIES
Discharge: HOME | End: 2020-11-11
Payer: COMMERCIAL

## 2020-11-11 PROCEDURE — 97110 THERAPEUTIC EXERCISES: CPT

## 2020-11-11 PROCEDURE — 97110 THERAPEUTIC EXERCISES: CPT | Performed by: SPECIALIST/TECHNOLOGIST

## 2020-11-11 PROCEDURE — 97140 MANUAL THERAPY 1/> REGIONS: CPT

## 2020-11-11 PROCEDURE — 97016 VASOPNEUMATIC DEVICE THERAPY: CPT | Performed by: SPECIALIST/TECHNOLOGIST

## 2020-11-11 NOTE — FLOWSHEET NOTE
[facility]    Physical Therapy Treatment Note/ Progress Report:     Date:  2020    Patient Name:  Analia Olmstead    :  1975  MRN: [id number]  Medical/Treatment Diagnosis Information:  · Diagnosis: Z47.89 Encounter for orthopedic aftercare; M75.111 Incomplete R RTC tear; M25.511 R shoulder pain  · Treatment Diagnosis: Z47.89 Encounter for orthopedic aftercare; M75.111 Incomplete R RTC tear; M25.511 R shoulder pain  Insurance/Certification information:  PT Insurance Information: [employer] [place] - 61 PT visits/0 used  Physician Information:  Referring Practitioner: Radha Mcmullen  Plan of care signed (Y/N):     Date of Patient follow up with Physician: 10/28     Progress Report: []  Yes  [x]  No     Functional Scale: 98% disability - Fish Villalba   Date:  20    Date Range for reporting period:  Beginnin20  Ending:  NA    Progress report due (10 Rx/or 30 days whichever is less):      Recertification due (POC duration/ or 90 days whichever is less): 10/29/20     Visit # Insurance Allowable Auth Needed    13   [employer] [place] - 61 PT visits/0 used []Yes    [x]No     Pain level:  0/10 at start of session; 5/10 with ROM/stretching exercises     SUBJECTIVE:  Rt shoulder doing well, but has consistent pulling across lateral and anterior shoulder. OBJECTIVE:    Observation:    10/1/20 Rt bicep with mild ecchymosis present. Pt observed keeping arm guarded against her side even when the sling is off.  10/20/20  Rt shoulder observed  in guarded state in supine position while on wedge   20 RT shoulder not as guarded today with PROM. Flex  ~ 100  ABD ~ 105 ER 45 @90   IR 45  @90   Test measurements:       RESTRICTIONS/PRECAUTIONS: Situs inversus. Heterotaxy syndrome. Type II diabetes. High BP. Previous R & L knee scopes. [place] barger tear on esophagus.  R shoulder scope, RTC repair & augmentation, SAD, biceps tenodesis 9/17/20. Exercises/Interventions:   Therapeutic Exercise  Min:  25 Wt / Resistance Sets/sec Reps Notes   Pendulums fwd/bwd, side/side, circles   30 each direction    Pulleys scaption/ flexion   3 min    Table slides flexion/scaption   10 each direction    Supine/seated cane ER  10-15s 10    Scap retractions - seated  5s 20    TB rows/pulldowns  TB ER/IR   TB lat pulldown (high row) green  Red  green  2 10    Isometrics flex / ER/ IR HEP 10\" 10 Added ER/IR 10/22   Supine cane flex  10s 10    Supine cane press   2 10    Sidelying ER  7 oz red ball  2 10    No monies yellow 2 10    Prone rows/extensions 14 oz yellow ball 2 10 each    Wall slides flexion  5s 10    SWB flexion - seated  5s 10           Manual Intervention  Min:  20       R shoulder PROM    20 min GH  Grade 1/2 mobilizations with                                              NMR Re-education  Min:  0       BB ER/IR  NPV                                                           Therapeutic Exercise and NMR EXR  [x] (96895) Provided verbal/tactile cueing for activities related to strengthening, flexibility, endurance, ROM  for improvements in scapular, scapulothoracic and UE control with self care, reaching, carrying, lifting, house/yardwork, driving/computer work. [x] (07896) Provided verbal/tactile cueing for activities related to improving balance, coordination, kinesthetic sense, posture, motor skill, proprioception  to assist with  scapular, scapulothoracic and UE control with self care, reaching, carrying, lifting, house/yardwork, driving/computer work. Therapeutic Activities:    [] (28799 or 22204) Provided verbal/tactile cueing for activities related to improving balance, coordination, kinesthetic sense, posture, motor skill, proprioception and motor activation to allow for proper function of scapular, scapulothoracic and UE control with self care, carrying, lifting, driving/computer work.      Home Exercise Program:    [x] (64863) Reviewed/Progressed HEP activities related to strengthening, flexibility, endurance, ROM of scapular, scapulothoracic and UE control with self care, reaching, carrying, lifting, house/yardwork, driving/computer work  [] (91493) Reviewed/Progressed HEP activities related to improving balance, coordination, kinesthetic sense, posture, motor skill, proprioception of scapular, scapulothoracic and UE control with self care, reaching, carrying, lifting, house/yardwork, driving/computer work      Manual Treatments:  PROM / STM / Oscillations-Mobs:  G-I, II, III, IV (PA's, Inf., Post.)  [x] (39706) Provided manual therapy to mobilize soft tissue/joints of cervical/CT, scapular GHJ and UE for the purpose of modulating pain, promoting relaxation,  increasing ROM, reducing/eliminating soft tissue swelling/inflammation/restriction, improving soft tissue extensibility and allowing for proper ROM for normal function with self care, reaching, carrying, lifting, house/yardwork, driving/computer work    Modalities:  Game Ready to R shoulder for pain/inflammation/edema - 10 minutes (personal preference). Charges:  Timed Code Treatment Minutes: 45   Total Treatment Minutes:        [] EVAL (LOW) 50284 (typically 20 minutes face-to-face)  [] EVAL (MOD) 48485 (typically 30 minutes face-to-face)  [] EVAL (HIGH) 57445 (typically 45 minutes face-to-face)  [] RE-EVAL     [x] XP(26108) x   1 ST 1 [x] IONTO (99665)  [] NMR (01607) x     [x] VASO (41203)  [x] Manual (30270) x ST    [] Other:  [] TA (30605)x     [] Mech Traction (81605)  [] ES(attended) (60629)     [] ES (un) (17770): If [facility] Please Indicate Time In/Out  CPT Code Time in Time out                                     GOALS:  Patient stated goal: Regain full use of R arm. [x] Progressing: [] Met: [] Not Met: [] Adjusted    Therapist goals for Patient:   Short Term Goals: To be achieved in: 2 weeks  1.  Independent in HEP and progression per patient tolerance, in order to prevent re-injury. [x] Progressing: [] Met: [] Not Met: [] Adjusted  2. Patient will have a decrease in pain to facilitate improvement in movement, function, and ADLs as indicated by Functional Deficits. [x] Progressing: [] Met: [] Not Met: [] Adjusted    Long Term Goals: To be achieved in: 4 weeks  1. Disability index score of 20% or less for the DASH to assist with reaching prior level of function. [x] Progressing: [] Met: [] Not Met: [] Adjusted  2. Patient will demonstrate increased R shoulder AROM to allow for proper joint functioning as indicated by patients Functional Deficits. [x] Progressing: [] Met: [] Not Met: [] Adjusted  3. Patient will demonstrate an increase in R shoulder strength to within 5# HHD compared to uninvolved L UE to allow for proper functional mobility as indicated by patients Functional Deficits. [x] Progressing: [] Met: [] Not Met: [] Adjusted  4. Patient will be able to perform all ADLs, self care tasks, and household chores using R UE without increased symptoms or restriction. [x] Progressing: [] Met: [] Not Met: [] Adjusted  5. Patient will be able to perform all work tasks using R UE without increased symptoms or restriction. [x] Progressing: [] Met: [] Not Met: [] Adjusted     Progression Towards Functional goals:  [x] Patient is progressing as expected towards functional goals listed. [] Progression is slowed due to complexities listed. [] Progression has been slowed due to co-morbidities. [] Plan just implemented, too soon to assess goals progression  [] Other:     ASSESSMENT:     ROM still restricted particularly when moving into OH range due to patient apprehension and guarding. Added supine cane flexion and wall walks flexion to HEP to further address restrictions in OH ranges outside of PT for improved carryover between sessions.     Return to Play: (if applicable)    []  Stage 1: Intro to Strength   []  Stage 2: Dynamic Strength and Intro to Plyometrics   [] Stage 3: Advanced Plyometrics and Intro to Throwing   []  Stage 4: Sport specific Training/Return to Sport     []  Ready to Return to Play, [employer]   []  Not Ready for Return to Sports   Comments:      Treatment/Activity Tolerance:  [x] Patient tolerated treatment well [x] Patient limited by fatique  [] Patient limited by pain    [] Patient limited by other medical complications   [] Other:     Overall Progression Towards Functional goals/ Treatment Progress Update:  [x] Patient is progressing as expected towards functional goals listed. [] Progression is slowed due to complexities/Impairments listed. [] Progression has been slowed due to co-morbidities. [] Plan just implemented, too soon to assess goals progression <30days   [] Goals require adjustment due to lack of progress  [] Patient is not progressing as expected and requires additional follow up with physician  [] Other    Prognosis for POC: [x] Good [] Fair  [] Poor    Patient requires continued skilled intervention: [x] Yes  [] No      PLAN:  Progress note NV. [x] Continue per plan of care [] Alter current plan (see comments)  [] Plan of care initiated [] Hold pending MD visit [] Discharge    Electronically signed by: Jonette Dance, [facility], [id number]       Note: If patient does not return for scheduled/recommended follow up visits, this note will serve as a discharge from care along with the most recent update on progress.

## 2020-11-13 ENCOUNTER — HOSPITAL ENCOUNTER (OUTPATIENT)
Dept: PHYSICAL THERAPY | Age: 45
Setting detail: THERAPIES SERIES
Discharge: HOME | End: 2020-11-13
Payer: COMMERCIAL

## 2020-11-13 PROCEDURE — 97016 VASOPNEUMATIC DEVICE THERAPY: CPT | Performed by: SPECIALIST/TECHNOLOGIST

## 2020-11-13 PROCEDURE — 97140 MANUAL THERAPY 1/> REGIONS: CPT | Performed by: SPECIALIST/TECHNOLOGIST

## 2020-11-13 PROCEDURE — 97110 THERAPEUTIC EXERCISES: CPT | Performed by: SPECIALIST/TECHNOLOGIST

## 2020-11-13 NOTE — FLOWSHEET NOTE
[facility]    Physical Therapy Treatment Note/ Progress Report:     Date:  2020    Patient Name:  Nehal Templeton    :  1975  MRN: [id number]  Medical/Treatment Diagnosis Information:  · Diagnosis: Z47.89 Encounter for orthopedic aftercare; M75.111 Incomplete R RTC tear; M25.511 R shoulder pain  · Treatment Diagnosis: Z47.89 Encounter for orthopedic aftercare; M75.111 Incomplete R RTC tear; M25.511 R shoulder pain  Insurance/Certification information:  PT Insurance Information: [facility] [employer] - 61 PT visits/0 used  Physician Information:  Referring Practitioner: Po Espinoza  Plan of care signed (Y/N):     Date of Patient follow up with Physician: 10/28     Progress Report: []  Yes  [x]  No     Functional Scale: 98% disability - Ping Geoffrey   Date:  20    Date Range for reporting period:  Beginnin20  Ending:  NA    Progress report due (10 Rx/or 30 days whichever is less):      Recertification due (POC duration/ or 90 days whichever is less): 10/29/20     Visit # Insurance Allowable Auth Needed    14   [employer] - 61 PT visits/0 used []Yes    [x]No     Pain level:  0/10 at start of session; 5/10 with ROM/stretching exercises     SUBJECTIVE:  Pt 11+ late to appt today. Pt reports having some Rt shoulder soreness but no significant amounts of pain. [place]      OBJECTIVE:    Observation:    10/1/20 Rt bicep with mild ecchymosis present. Pt observed keeping arm guarded against her side even when the sling is off.  10/20/20  Rt shoulder observed  in guarded state in supine position while on wedge   20 RT shoulder not as guarded today with PROM.  Flex  ~ 100  ABD ~ 105 ER 45 @90   IR 45  @90   Test measurements:      20     Leala Peed: 66%  Left Right    Shoulder Flex 180 142   Shoulder Abd (Scapular plane) 180 165   Shoulder ER 70 @ 90 55 @ 90   Shoulder  @ 90 48  @90                           Strength  Left Right   Shoulder Flex TBA    Shoulder Abd TBA    Shoulder ER TBA    Shoulder IR TBA      RESTRICTIONS/PRECAUTIONS: Situs inversus. Heterotaxy syndrome. Type II diabetes. High BP. Previous R & L knee scopes. [place] barger tear on esophagus. R shoulder scope, RTC repair & augmentation, SAD, biceps tenodesis 9/17/20. Exercises/Interventions:   Therapeutic Exercise  Min:  25 Wt / Resistance Sets/sec Reps Notes          Pulleys scaption/ flexion   3 min    Table slides flexion/scaption   10 each direction    Supine punch  7 ounces  2 10x    IR towel  NPV        TB rows/pulldowns  TB ER/IR   TB lat pulldown (high row) Green  [employer]  2 10    Isometrics flex / ER/ IR HEP 10\" 10 Added ER/IR 10/22   Supine cane flex/ ER  10s 10ea    Supine cane press   2 10 Wt NPV   Sidelying ER  7 oz red ball  2 10 Corrected mechanics   No monies yellow 2 10    Prone Rows/Extensions 14 oz yellow ball 2 10 each       SWB flexion - standing  5s 10           Manual Intervention  Min:  20       R shoulder PROM    20 min GH  Grade 1/2 mobilizations with                                              NMR Re-education  Min:  0       BB ER/IR   Yellow   10\" x5                                                           Therapeutic Exercise and NMR EXR  [x] (42613) Provided verbal/tactile cueing for activities related to strengthening, flexibility, endurance, ROM  for improvements in scapular, scapulothoracic and UE control with self care, reaching, carrying, lifting, house/yardwork, driving/computer work. [x] (53589) Provided verbal/tactile cueing for activities related to improving balance, coordination, kinesthetic sense, posture, motor skill, proprioception  to assist with  scapular, scapulothoracic and UE control with self care, reaching, carrying, lifting, house/yardwork, driving/computer work.     Therapeutic Activities:    [] (63538 or ) Provided verbal/tactile cueing for activities related to improving balance, coordination, kinesthetic sense, posture, motor skill, proprioception and motor activation to allow for proper function of scapular, scapulothoracic and UE control with self care, carrying, lifting, driving/computer work. Home Exercise Program:    [x] (78475) Reviewed/Progressed HEP activities related to strengthening, flexibility, endurance, ROM of scapular, scapulothoracic and UE control with self care, reaching, carrying, lifting, house/yardwork, driving/computer work  [] (28446) Reviewed/Progressed HEP activities related to improving balance, coordination, kinesthetic sense, posture, motor skill, proprioception of scapular, scapulothoracic and UE control with self care, reaching, carrying, lifting, house/yardwork, driving/computer work      Manual Treatments:  PROM / STM / Oscillations-Mobs:  G-I, II, III, IV (PA's, Inf., Post.)  [x] (29777) Provided manual therapy to mobilize soft tissue/joints of cervical/CT, scapular GHJ and UE for the purpose of modulating pain, promoting relaxation,  increasing ROM, reducing/eliminating soft tissue swelling/inflammation/restriction, improving soft tissue extensibility and allowing for proper ROM for normal function with self care, reaching, carrying, lifting, house/yardwork, driving/computer work    Modalities:  Game Ready to R shoulder for pain/inflammation/edema - 10 minutes (personal preference). Charges:  Timed Code Treatment Minutes: 45   Total Treatment Minutes: 55       [] EVAL (LOW) 05911 (typically 20 minutes face-to-face)  [] EVAL (MOD) 88285 (typically 30 minutes face-to-face)  [] EVAL (HIGH) 86575 (typically 45 minutes face-to-face)  [] RE-EVAL     [x] EE(70068) x    2                  [] IONTO (06711)  [] NMR (60315) x     [x] VASO (26151)  [x] Manual (33636) x   1  [] Other:  [] TA (16124)x     [] Mech Traction (36027)  [] ES(attended) (20785)     [] ES (un) (47753):      If BWC Please Indicate Time In/Out  CPT Code Time in Time out GOALS:  Patient stated goal: Regain full use of R arm. [x] Progressing: [] Met: [] Not Met: [] Adjusted    Therapist goals for Patient:   Short Term Goals: To be achieved in: 2 weeks  1. Independent in HEP and progression per patient tolerance, in order to prevent re-injury. [x] Progressing: [] Met: [] Not Met: [] Adjusted  2. Patient will have a decrease in pain to facilitate improvement in movement, function, and ADLs as indicated by Functional Deficits. [x] Progressing: [] Met: [] Not Met: [] Adjusted    Long Term Goals: To be achieved in: 4 weeks  1. Disability index score of 20% or less for the DASH to assist with reaching prior level of function. [x] Progressing: [] Met: [] Not Met: [] Adjusted  2. Patient will demonstrate increased R shoulder AROM to allow for proper joint functioning as indicated by patients Functional Deficits. [x] Progressing: [] Met: [] Not Met: [] Adjusted  3. Patient will demonstrate an increase in R shoulder strength to within 5# HHD compared to uninvolved L UE to allow for proper functional mobility as indicated by patients Functional Deficits. [x] Progressing: [] Met: [] Not Met: [] Adjusted  4. Patient will be able to perform all ADLs, self care tasks, and household chores using R UE without increased symptoms or restriction. [x] Progressing: [] Met: [] Not Met: [] Adjusted  5. Patient will be able to perform all work tasks using R UE without increased symptoms or restriction. [x] Progressing: [] Met: [] Not Met: [] Adjusted     Progression Towards Functional goals:  [x] Patient is progressing as expected towards functional goals listed. [] Progression is slowed due to complexities listed. [] Progression has been slowed due to co-morbidities.   [] Plan just implemented, too soon to assess goals progression  [] Other:     ASSESSMENT:     ROM still restricted particularly when moving into OH range due to end range stiffness in flexion and IR. Pt tolerated progressions with wts/ and performing ER/IR using body blade and coordination was challenged. Pt demonstrated improvements in PROM and overall less anterior shoulder pain but likes to suppport her Rt shoulder with manual stretching. Pt needs verbal cues to keep posterior scapular muscles low while avoiding shoulder hike to compensate. Return to Play: (if applicable)    []  Stage 1: Intro to Strength   []  Stage 2: Dynamic Strength and Intro to Plyometrics   []  Stage 3: Advanced Plyometrics and Intro to Throwing   []  Stage 4: Sport specific Training/Return to Sport     []  Ready to Return to Play, [employer]   []  Not Ready for Return to Sports   Comments:      Treatment/Activity Tolerance:  [x] Patient tolerated treatment well [x] Patient limited by fatique  [] Patient limited by pain    [] Patient limited by other medical complications   [] Other:     Overall Progression Towards Functional goals/ Treatment Progress Update:  [x] Patient is progressing as expected towards functional goals listed. [] Progression is slowed due to complexities/Impairments listed. [] Progression has been slowed due to co-morbidities. [] Plan just implemented, too soon to assess goals progression <30days   [] Goals require adjustment due to lack of progress  [] Patient is not progressing as expected and requires additional follow up with physician  [] Other    Prognosis for POC: [x] Good [] Fair  [] Poor    Patient requires continued skilled intervention: [x] Yes  [] No      PLAN:   Progress strength and IR ROM with towel stretch/ NPV  [x] Continue per plan of care [] Alter current plan (see comments)  [] Plan of care initiated [] Hold pending MD visit [] Discharge    Electronically signed by: Deepak Loredo, KYLE, [id number]       Note: If patient does not return for scheduled/recommended follow up visits, this note will serve as a discharge from care along with the most recent update on progress.

## 2020-11-16 ENCOUNTER — HOSPITAL ENCOUNTER (OUTPATIENT)
Dept: PHYSICAL THERAPY | Age: 45
Setting detail: THERAPIES SERIES
Discharge: HOME | End: 2020-11-16
Payer: COMMERCIAL

## 2020-11-16 PROCEDURE — 97110 THERAPEUTIC EXERCISES: CPT

## 2020-11-16 PROCEDURE — 97140 MANUAL THERAPY 1/> REGIONS: CPT

## 2020-11-16 NOTE — FLOWSHEET NOTE
[facility]      Physical Therapy Re-Certification Plan of Care/MD UPDATE      Dear Joao Guerrero,    We had the pleasure of treating the following patient for physical therapy services at [address] [place] [address]. A summary of our findings can be found in the updated assessment below. This includes our plan of care. If you have any questions or concerns regarding these findings, please do not hesitate to contact me at the office phone number checked above.   Thank you for the referral.     Physician Signature:________________________________Date:__________________  By signing above (or electronic signature), therapists plan is approved by physician    Date Range Of Visits: 20 to 20  Total Visits to Date: 13  Overall Response to Treatment:   [x]Patient is responding well to treatment and improvement is noted with regards  to goals   []Patient should continue to improve in reasonable time if they continue HEP   []Patient has plateaued and is no longer responding to skilled PT intervention    []Patient is getting worse and would benefit from return to referring MD   []Patient unable to adhere to initial POC   []Other:     Physical Therapy Treatment Note/ Progress Report:     Date:  2020    Patient Name:  Desire Cohen    :  1975  MRN: [id number]  Medical/Treatment Diagnosis Information:  · Diagnosis: Z47.89 Encounter for orthopedic aftercare; M75.111 Incomplete R RTC tear; M25.511 R shoulder pain  · Treatment Diagnosis: Z47.89 Encounter for orthopedic aftercare; M75.111 Incomplete R RTC tear; M25.511 R shoulder pain  Insurance/Certification information:  PT Insurance Information: Medical [place] - 61 PT visits/0 used  Physician Information:  Referring Practitioner: Yolis Kinsey of care signed (Y/N):     Date of Patient follow up with Physician:      Progress Report: []  Yes  [x] No     Functional Scale: 66% disability - Radha Jackson   Date:  20    Date Range for reporting period:  Beginnin20  Ending:  NA    Progress report due (10 Rx/or 30 days whichever is less):      Recertification due (POC duration/ or 90 days whichever is less): 20    Visit # Insurance Allowable Auth Needed    15   [facility] [place] - 60 PT visits/0 used []Yes    [x]No     Pain level:  0/10 at start of session; 5/10 with ROM/stretching exercises     SUBJECTIVE:  Patient 15 minutes late to session due to traffic per patient. States that she has been in bed all day because she has sciatica into her butt. States that pulling in the front of her R shoulder is gone. OBJECTIVE:    Observation:    10/1/20 Rt bicep with mild ecchymosis present. Pt observed keeping arm guarded against her side even when the sling is off.  10/20/20  Rt shoulder observed  in guarded state in supine position while on wedge   20 RT shoulder not as guarded today with PROM. Flex  ~ 100  ABD ~ 105 ER 45 @90   IR 45  @90   Test measurements:     (20): Ane [place]row: 66%   Left Right    Shoulder Flex 180 142   Shoulder Abd (Scapular plane) 180 165   Shoulder ER 70 @ 90 55 @ 90   Shoulder  @ 90 48  @90                           Strength  Left Right   Shoulder Flex TBA TBA   Shoulder Abd TBA TBA   Shoulder ER TBA TBA   Shoulder IR TBA TBA     RESTRICTIONS/PRECAUTIONS: Situs inversus. Heterotaxy syndrome. Type II diabetes. High BP. Previous R & L knee scopes. [place] barger tear on esophagus. R shoulder scope, RTC repair & augmentation, SAD, biceps tenodesis 20.     Exercises/Interventions:   Therapeutic Exercise  Min:  25 Wt / Resistance Sets/sec Reps Notes   Pulleys scaption/ flexion   3 min    Table slides flexion/scaption   10 each direction    Supine punch  7 ounces  2 10    IR towel stretch behind back  5s 10      TB rows/pulldowns  TB ER/IR   TB lat pulldown (high row) Green  [employer]  2 10 Isometrics flex / ER/ IR HEP 10\" 10 Added ER/IR 10/22   Supine cane flex/ER  10s 10ea    Supine cane press  2# 2 10    Sidelying ER  1#  2 10    No monies yellow 2 10    Prone rows/extensions 14 oz yellow ball 2 10 each    Wall slides flexion  5s 10    SWB flexion   5s 10    Cable column extension 5# 2 10    Cable column rows 5# 2 10           Patient education. 5 min Towel positioning seated, PPT supine to address sxs into buttock          Manual Intervention  Min:  20       R shoulder PROM    20 min GH  Grade 1/2 mobilizations with                                              NMR Re-education  Min:  0       BB ER/IR   Yellow   10\" x5                                                           Therapeutic Exercise and NMR EXR  [x] (45625) Provided verbal/tactile cueing for activities related to strengthening, flexibility, endurance, ROM  for improvements in scapular, scapulothoracic and UE control with self care, reaching, carrying, lifting, house/yardwork, driving/computer work. [x] (52997) Provided verbal/tactile cueing for activities related to improving balance, coordination, kinesthetic sense, posture, motor skill, proprioception  to assist with  scapular, scapulothoracic and UE control with self care, reaching, carrying, lifting, house/yardwork, driving/computer work. Therapeutic Activities:    [] (99743 or 23340) Provided verbal/tactile cueing for activities related to improving balance, coordination, kinesthetic sense, posture, motor skill, proprioception and motor activation to allow for proper function of scapular, scapulothoracic and UE control with self care, carrying, lifting, driving/computer work.      Home Exercise Program:    [x] (03746) Reviewed/Progressed HEP activities related to strengthening, flexibility, endurance, ROM of scapular, scapulothoracic and UE control with self care, reaching, carrying, lifting, house/yardwork, driving/computer work  [] (53725) Reviewed/Progressed HEP activities related to improving balance, coordination, kinesthetic sense, posture, motor skill, proprioception of scapular, scapulothoracic and UE control with self care, reaching, carrying, lifting, house/yardwork, driving/computer work      Manual Treatments:  PROM / STM / Oscillations-Mobs:  G-I, II, III, IV (PA's, Inf., Post.)  [x] (87816) Provided manual therapy to mobilize soft tissue/joints of cervical/CT, scapular GHJ and UE for the purpose of modulating pain, promoting relaxation,  increasing ROM, reducing/eliminating soft tissue swelling/inflammation/restriction, improving soft tissue extensibility and allowing for proper ROM for normal function with self care, reaching, carrying, lifting, house/yardwork, driving/computer work    Modalities:    Charges:  Timed Code Treatment Minutes: 45   Total Treatment Minutes: 45       [] EVAL (LOW) 26472 (typically 20 minutes face-to-face)  [] EVAL (MOD) 17738 (typically 30 minutes face-to-face)  [] EVAL (HIGH) 58693 (typically 45 minutes face-to-face)  [] RE-EVAL     [x] LW(51914) x 2                     [] IONTO (88300)  [] NMR (09523) x     [] VASO (33968)  [x] Manual (30203) x 1    [] Other:  [] TA (85773)x     [] Mech Traction (43817)  [] ES(attended) (01883)     [] ES (un) (88721): If BW Please Indicate Time In/Out  CPT Code Time in Time out                                     GOALS:  Patient stated goal: Regain full use of R arm. [x] Progressing: [] Met: [] Not Met: [] Adjusted    Therapist goals for Patient:   Short Term Goals: To be achieved in: 2 weeks  1. Independent in HEP and progression per patient tolerance, in order to prevent re-injury. [x] Progressing: [] Met: [] Not Met: [] Adjusted  2. Patient will have a decrease in pain to facilitate improvement in movement, function, and ADLs as indicated by Functional Deficits. [x] Progressing: [] Met: [] Not Met: [] Adjusted    Long Term Goals: To be achieved in: 4 weeks  1.  Disability index score of Intro to Plyometrics   []  Stage 3: Advanced Plyometrics and Intro to Throwing     []  Stage 4: Sport specific Training/Return to Sport     []  Ready to Return to Play, [employer]   []  Not Ready for Return to Sports   Comments:      Treatment/Activity Tolerance:  [x] Patient tolerated treatment well [x] Patient limited by fatique  [] Patient limited by pain    [] Patient limited by other medical complications   [] Other:     Overall Progression Towards Functional goals/ Treatment Progress Update:  [x] Patient is progressing as expected towards functional goals listed. [] Progression is slowed due to complexities/Impairments listed. [] Progression has been slowed due to co-morbidities. [] Plan just implemented, too soon to assess goals progression <30days   [] Goals require adjustment due to lack of progress  [] Patient is not progressing as expected and requires additional follow up with physician  [] Other    Prognosis for POC: [x] Good [] Fair  [] Poor    Patient requires continued skilled intervention: [x] Yes  [] No      PLAN:  Progress R shoulder ROM into OH ranges and R shoulder strength and stability as william. [x] Continue per plan of care [] Alter current plan (see comments)  [] Plan of care initiated [] Hold pending MD visit [] Discharge    Electronically signed by: [place], [id number]       Note: If patient does not return for scheduled/recommended follow up visits, this note will serve as a discharge from care along with the most recent update on progress.

## 2020-11-17 ENCOUNTER — APPOINTMENT (OUTPATIENT)
Dept: PHYSICAL THERAPY | Age: 45
End: 2020-11-17
Payer: COMMERCIAL

## 2020-11-18 ENCOUNTER — HOSPITAL ENCOUNTER (OUTPATIENT)
Dept: PHYSICAL THERAPY | Age: 45
Setting detail: THERAPIES SERIES
Discharge: HOME | End: 2020-11-18
Payer: COMMERCIAL

## 2020-11-18 PROCEDURE — 97110 THERAPEUTIC EXERCISES: CPT | Performed by: SPECIALIST/TECHNOLOGIST

## 2020-11-18 PROCEDURE — 97016 VASOPNEUMATIC DEVICE THERAPY: CPT | Performed by: SPECIALIST/TECHNOLOGIST

## 2020-11-18 PROCEDURE — 97140 MANUAL THERAPY 1/> REGIONS: CPT | Performed by: SPECIALIST/TECHNOLOGIST

## 2020-11-18 NOTE — FLOWSHEET NOTE
[facility]          Physical Therapy Treatment Note/ Progress Report:     Date:  2020    Patient Name:  Analia Olmstead    :  1975  MRN: [id number]  Medical/Treatment Diagnosis Information:  · Diagnosis: Z47.89 Encounter for orthopedic aftercare; M75.111 Incomplete R RTC tear; M25.511 R shoulder pain  · Treatment Diagnosis: Z47.89 Encounter for orthopedic aftercare; M75.111 Incomplete R RTC tear; M25.511 R shoulder pain  Insurance/Certification information:  PT Insurance Information: [facility] [place] - 61 PT visits/0 used  Physician Information:  Referring Practitioner: Radha Mcmullen  Plan of care signed (Y/N):     Date of Patient follow up with Physician:      Progress Report: []  Yes  [x]  No     Functional Scale: 66% disability - Fish Heron   Date:  20    Date Range for reporting period:  Beginnin20  Ending:  NA    Progress report due (10 Rx/or 30 days whichever is less): 23/15/87     Recertification due (POC duration/ or 90 days whichever is less): 20    Visit # Insurance Allowable Auth Needed    16   [facility] [place] - 60 PT visits/0 used []Yes    [x]No     Pain level:  0/10 at start of session; 5/10 with ROM/stretching exercises     SUBJECTIVE:  Pt. is doing much better overall. Rt shoulder is moving better. LB is doing better also and can sit more. OBJECTIVE:    Observation:    10/1/20 Rt bicep with mild ecchymosis present. Pt observed keeping arm guarded against her side even when the sling is off.  10/20/20  Rt shoulder observed  in guarded state in supine position while on wedge   20 RT shoulder not as guarded today with PROM. Flex  ~ 100  ABD ~ 105 ER 45 @90   IR 45  @90   Test measurements:     (20):     Sage Madalyn: 66%   Left Right    Shoulder Flex 180 142   Shoulder Abd (Scapular plane) 180 165   Shoulder ER 70 @ 90 55 @ 90   Shoulder  @ 90 48  @90                           Strength  Left Right   Shoulder Flex TBA TBA   Shoulder Abd TBA TBA   Shoulder ER TBA TBA   Shoulder IR TBA TBA     RESTRICTIONS/PRECAUTIONS: Situs inversus. Heterotaxy syndrome. Type II diabetes. High BP. Previous R & L knee scopes. [place] barger tear on esophagus. R shoulder scope, RTC repair & augmentation, SAD, biceps tenodesis 9/17/20. Exercises/Interventions:   Therapeutic Exercise  Min:  25 Wt / Resistance Sets/sec Reps Notes   Pulleys scaption/ flexion   3 min       Supine punch  7 ounces  2 10 ^ NPV   IR towel stretch behind back  5s 10      TB rows/pulldowns  TB ER/IR   TB lat pulldown (high row) Green  Red/ Green  Green  2 10           Supine cane flex/ER  10s 10ea    Supine cane press  2# 2 10    Sidelying ER   SL punch  1#  2 10    No monies RED 2 10    Prone rows 2# extensions   prone Habd 1#  x20 2 10 each    Wall slides flexion B 5s 10    SWB flexion   5s 10    Cable column extension 5# 2 10    Cable column rows 5# 2 10                  Manual Intervention  Min:  20       R shoulder PROM    20 min GH  Grade 1/2 mobilizations with                                              NMR Re-education  Min:  0       BB ER/IR   Yellow   10\" x5                                                           Therapeutic Exercise and NMR EXR  [x] (91853) Provided verbal/tactile cueing for activities related to strengthening, flexibility, endurance, ROM  for improvements in scapular, scapulothoracic and UE control with self care, reaching, carrying, lifting, house/yardwork, driving/computer work. [x] (44824) Provided verbal/tactile cueing for activities related to improving balance, coordination, kinesthetic sense, posture, motor skill, proprioception  to assist with  scapular, scapulothoracic and UE control with self care, reaching, carrying, lifting, house/yardwork, driving/computer work.     Therapeutic Activities:    [] (88668 or ) Provided verbal/tactile cueing for activities related to improving balance, coordination, kinesthetic sense, posture, motor skill, proprioception and motor activation to allow for proper function of scapular, scapulothoracic and UE control with self care, carrying, lifting, driving/computer work. Home Exercise Program:    [x] (07510) Reviewed/Progressed HEP activities related to strengthening, flexibility, endurance, ROM of scapular, scapulothoracic and UE control with self care, reaching, carrying, lifting, house/yardwork, driving/computer work  [] (46062) Reviewed/Progressed HEP activities related to improving balance, coordination, kinesthetic sense, posture, motor skill, proprioception of scapular, scapulothoracic and UE control with self care, reaching, carrying, lifting, house/yardwork, driving/computer work      Manual Treatments:  PROM / STM / Oscillations-Mobs:  G-I, II, III, IV (PA's, Inf., Post.)  [x] (70051) Provided manual therapy to mobilize soft tissue/joints of cervical/CT, scapular GHJ and UE for the purpose of modulating pain, promoting relaxation,  increasing ROM, reducing/eliminating soft tissue swelling/inflammation/restriction, improving soft tissue extensibility and allowing for proper ROM for normal function with self care, reaching, carrying, lifting, house/yardwork, driving/computer work    Modalities:  shirley Ready to R shoulder for pain/inflammation/edema - 15 minutes (personal preference). Charges:  Timed Code Treatment Minutes: 45   Total Treatment Minutes: 60       [] EVAL (LOW) 12798 (typically 20 minutes face-to-face)  [] EVAL (MOD) 30317 (typically 30 minutes face-to-face)  [] EVAL (HIGH) 54956 (typically 45 minutes face-to-face)  [] RE-EVAL     [x] PP(65449) x 2                     [] IONTO (84388)  [] NMR (49817) x     [x] VASO (34672)  [x] Manual (02418) x 1    [] Other:  [] TA (34416)x     [] Mech Traction (93632)  [] ES(attended) (40911)     [] ES (un) (32043):      If BWC Please Indicate Time In/Out  CPT Code Time in Time out GOALS:  Patient stated goal: Regain full use of R arm. [x] Progressing: [] Met: [] Not Met: [] Adjusted    Therapist goals for Patient:   Short Term Goals: To be achieved in: 2 weeks  1. Independent in HEP and progression per patient tolerance, in order to prevent re-injury. [x] Progressing: [] Met: [] Not Met: [] Adjusted  2. Patient will have a decrease in pain to facilitate improvement in movement, function, and ADLs as indicated by Functional Deficits. [x] Progressing: [] Met: [] Not Met: [] Adjusted    Long Term Goals: To be achieved in: 4 weeks  1. Disability index score of 20% or less for the DASH to assist with reaching prior level of function. [x] Progressing: [] Met: [] Not Met: [] Adjusted  2. Patient will demonstrate increased R shoulder AROM to allow for proper joint functioning as indicated by patients Functional Deficits. [x] Progressing: [] Met: [] Not Met: [] Adjusted  3. Patient will demonstrate an increase in R shoulder strength to within 5# HHD compared to uninvolved L UE to allow for proper functional mobility as indicated by patients Functional Deficits. [x] Progressing: [] Met: [] Not Met: [] Adjusted  4. Patient will be able to perform all ADLs, self care tasks, and household chores using R UE without increased symptoms or restriction. [x] Progressing: [] Met: [] Not Met: [] Adjusted  5. Patient will be able to perform all work tasks using R UE without increased symptoms or restriction. [x] Progressing: [] Met: [] Not Met: [] Adjusted     Progression Towards Functional goals:  [x] Patient is progressing as expected towards functional goals listed. [] Progression is slowed due to complexities listed. [] Progression has been slowed due to co-morbidities. [] Plan just implemented, too soon to assess goals progression  [] Other:     ASSESSMENT:    Rt shoulder ROM progressing, but patient somewhat behind with OH flexion and abduction ranges.  Has moments of

## 2020-11-19 ENCOUNTER — APPOINTMENT (OUTPATIENT)
Dept: PHYSICAL THERAPY | Age: 45
End: 2020-11-19
Payer: COMMERCIAL

## 2020-11-20 ENCOUNTER — HOSPITAL ENCOUNTER (OUTPATIENT)
Dept: PHYSICAL THERAPY | Age: 45
Setting detail: THERAPIES SERIES
Discharge: HOME | End: 2020-11-20
Payer: COMMERCIAL

## 2020-11-20 PROCEDURE — 97140 MANUAL THERAPY 1/> REGIONS: CPT

## 2020-11-20 PROCEDURE — 97110 THERAPEUTIC EXERCISES: CPT

## 2020-11-20 NOTE — FLOWSHEET NOTE
[facility]          Physical Therapy Treatment Note/ Progress Report:     Date:  2020    Patient Name:  Emaline Lefort    :  1975  MRN: [id number]  Medical/Treatment Diagnosis Information:  · Diagnosis: Z47.89 Encounter for orthopedic aftercare; M75.111 Incomplete R RTC tear; M25.511 R shoulder pain  · Treatment Diagnosis: Z47.89 Encounter for orthopedic aftercare; M75.111 Incomplete R RTC tear; M25.511 R shoulder pain  Insurance/Certification information:  PT Insurance Information: [facility] [place] - 61 PT visits/0 used  Physician Information:  Referring Practitioner: Wale Taveras  Plan of care signed (Y/N):     Date of Patient follow up with Physician:      Progress Report: []  Yes  [x]  No     Functional Scale: 66% disability - Liliana Hernandez   Date:  20    Date Range for reporting period:  Beginnin20  Ending:  NA    Progress report due (10 Rx/or 30 days whichever is less):      Recertification due (POC duration/ or 90 days whichever is less): 20    Visit # Insurance Allowable Auth Needed    17   [facility] [place] - 60 PT visits/0 used []Yes    [x]No     Pain level:  0/10 at start of session; 5/10 with ROM/stretching exercises     SUBJECTIVE:  Low back and buttock pain resolved. Patient admits she has only gotten out of bed today to make herself breakfast. Has not performed stretching exercises at all today. Patient did stretches 2x yesterday. OBJECTIVE:    Observation:    10/1/20 Rt bicep with mild ecchymosis present. Pt observed keeping arm guarded against her side even when the sling is off.  10/20/20  Rt shoulder observed  in guarded state in supine position while on wedge   20 RT shoulder not as guarded today with PROM. Flex  ~ 100  ABD ~ 105 ER 45 @90   IR 45  @90   Test measurements:     (20):     Melinda Grover: 66%   Left Right    Shoulder Flex 180 142   Shoulder Abd (Scapular Activities:    [] (85073 or 22316) Provided verbal/tactile cueing for activities related to improving balance, coordination, kinesthetic sense, posture, motor skill, proprioception and motor activation to allow for proper function of scapular, scapulothoracic and UE control with self care, carrying, lifting, driving/computer work. Home Exercise Program:    [x] (16574) Reviewed/Progressed HEP activities related to strengthening, flexibility, endurance, ROM of scapular, scapulothoracic and UE control with self care, reaching, carrying, lifting, house/yardwork, driving/computer work  [] (88007) Reviewed/Progressed HEP activities related to improving balance, coordination, kinesthetic sense, posture, motor skill, proprioception of scapular, scapulothoracic and UE control with self care, reaching, carrying, lifting, house/yardwork, driving/computer work      Manual Treatments:  PROM / STM / Oscillations-Mobs:  G-I, II, III, IV (PA's, Inf., Post.)  [x] (23343) Provided manual therapy to mobilize soft tissue/joints of cervical/CT, scapular GHJ and UE for the purpose of modulating pain, promoting relaxation,  increasing ROM, reducing/eliminating soft tissue swelling/inflammation/restriction, improving soft tissue extensibility and allowing for proper ROM for normal function with self care, reaching, carrying, lifting, house/yardwork, driving/computer work    Modalities:  shirley Ready to R shoulder for pain/inflammation/edema - 15 minutes (personal preference).   Charges:  Timed Code Treatment Minutes: 45   Total Treatment Minutes: 45       [] EVAL (LOW) 29641 (typically 20 minutes face-to-face)  [] EVAL (MOD) 68867 (typically 30 minutes face-to-face)  [] EVAL (HIGH) 69646 (typically 45 minutes face-to-face)  [] RE-EVAL     [x] AW(23704) x 2                     [] IONTO (62222)  [] NMR (73258) x     [] VASO (13556)  [x] Manual (88808) x 1    [] Other:  [] TA (86618)x     [] Mech Traction (28660)  [] ES(attended) (97754) [] ES (un) (45819): If BWC Please Indicate Time In/Out  CPT Code Time in Time out                                     GOALS:  Patient stated goal: Regain full use of R arm. [x] Progressing: [] Met: [] Not Met: [] Adjusted    Therapist goals for Patient:   Short Term Goals: To be achieved in: 2 weeks  1. Independent in HEP and progression per patient tolerance, in order to prevent re-injury. [x] Progressing: [] Met: [] Not Met: [] Adjusted  2. Patient will have a decrease in pain to facilitate improvement in movement, function, and ADLs as indicated by Functional Deficits. [x] Progressing: [] Met: [] Not Met: [] Adjusted    Long Term Goals: To be achieved in: 4 weeks  1. Disability index score of 20% or less for the DASH to assist with reaching prior level of function. [x] Progressing: [] Met: [] Not Met: [] Adjusted  2. Patient will demonstrate increased R shoulder AROM to allow for proper joint functioning as indicated by patients Functional Deficits. [x] Progressing: [] Met: [] Not Met: [] Adjusted  3. Patient will demonstrate an increase in R shoulder strength to within 5# HHD compared to uninvolved L UE to allow for proper functional mobility as indicated by patients Functional Deficits. [x] Progressing: [] Met: [] Not Met: [] Adjusted  4. Patient will be able to perform all ADLs, self care tasks, and household chores using R UE without increased symptoms or restriction. [x] Progressing: [] Met: [] Not Met: [] Adjusted  5. Patient will be able to perform all work tasks using R UE without increased symptoms or restriction. [x] Progressing: [] Met: [] Not Met: [] Adjusted     Progression Towards Functional goals:  [x] Patient is progressing as expected towards functional goals listed. [] Progression is slowed due to complexities listed. [] Progression has been slowed due to co-morbidities.   [] Plan just implemented, too soon to assess goals progression  [] Other:     ASSESSMENT:    Patient still requiring significant encouragement to push into OH ranges despite discomfort. Progressing R shoulder strengthening tasks as william. Return to Play: (if applicable)    []  Stage 1: Intro to Strength   []  Stage 2: Dynamic Strength and Intro to Plyometrics    []  Stage 3: Advanced Plyometrics and Intro to Throwing     []  Stage 4: Sport specific Training/Return to Sport     []  Ready to Return to Play, [employer]   []  Not Ready for Return to Sports   Comments:      Treatment/Activity Tolerance:  [x] Patient tolerated treatment well [x] Patient limited by fatique  [] Patient limited by pain    [] Patient limited by other medical complications   [] Other:     Overall Progression Towards Functional goals/ Treatment Progress Update:  [x] Patient is progressing as expected towards functional goals listed. [] Progression is slowed due to complexities/Impairments listed. [] Progression has been slowed due to co-morbidities. [] Plan just implemented, too soon to assess goals progression <30days   [] Goals require adjustment due to lack of progress  [] Patient is not progressing as expected and requires additional follow up with physician  [] Other    Prognosis for POC: [x] Good [] Fair  [] Poor    Patient requires continued skilled intervention: [x] Yes  [] No      PLAN:  Progress R shoulder ROM into OH ranges and R shoulder strength and stability as william. [x] Continue per plan of care [] Alter current plan (see comments)  [] Plan of care initiated [] Hold pending MD visit [] Discharge    Electronically signed by: Radha Mcnulty, PT, [id number]       Note: If patient does not return for scheduled/recommended follow up visits, this note will serve as a discharge from care along with the most recent update on progress.

## 2020-11-23 ENCOUNTER — HOSPITAL ENCOUNTER (OUTPATIENT)
Dept: PHYSICAL THERAPY | Age: 45
Setting detail: THERAPIES SERIES
Discharge: HOME | End: 2020-11-23
Payer: COMMERCIAL

## 2020-11-23 PROCEDURE — 97140 MANUAL THERAPY 1/> REGIONS: CPT

## 2020-11-23 NOTE — FLOWSHEET NOTE
Strength  Left Right   Shoulder Flex TBA TBA   Shoulder Abd TBA TBA   Shoulder ER TBA TBA   Shoulder IR TBA TBA     RESTRICTIONS/PRECAUTIONS: Situs inversus. Heterotaxy syndrome. Type II diabetes. High BP. Previous R & L knee scopes. [place] barger tear on esophagus. R shoulder scope, RTC repair & augmentation, SAD, biceps tenodesis 9/17/20. Exercises/Interventions:   Therapeutic Exercise  Min:  0 Wt / Resistance Sets/sec Reps Notes   Pulleys scaption/ flexion   3 min       Supine punch  7 ounces  2 10 ^ NPV   IR towel stretch behind back  5s 10      TB rows/pulldowns  TB ER/IR   TB lat pulldown (high row) Green  Red/ Green  Green  2 10    Supine cane flex/ER  10s 10each    Supine cane press  2# 2 10    Supine SA punch  2 10    Sidelying ER   SL punch  1#  2 10    No monies RED 2 10    Prone rows 2# extensions   prone Habd 1#  x20 2 10 each    Wall slides flexion  5s 10    SWB flexion   5s 10    Cable column extension 5# 2 10    Cable column rows 5# 2 10    TB IR/ER red 2 10 each           Manual Intervention  Min:  10       R shoulder PROM    10 min                                              NMR Re-education  Min:  0       Body [place]ade ER/IR   Yellow    10s 5                                                         Therapeutic Exercise and NMR EXR  [x] (49146) Provided verbal/tactile cueing for activities related to strengthening, flexibility, endurance, ROM  for improvements in scapular, scapulothoracic and UE control with self care, reaching, carrying, lifting, house/yardwork, driving/computer work. [x] (70601) Provided verbal/tactile cueing for activities related to improving balance, coordination, kinesthetic sense, posture, motor skill, proprioception  to assist with  scapular, scapulothoracic and UE control with self care, reaching, carrying, lifting, house/yardwork, driving/computer work.     Therapeutic Activities:    [] (26415 or ) Provided verbal/tactile cueing for activities related to improving balance, coordination, kinesthetic sense, posture, motor skill, proprioception and motor activation to allow for proper function of scapular, scapulothoracic and UE control with self care, carrying, lifting, driving/computer work. Home Exercise Program:    [] (53215) Reviewed/Progressed HEP activities related to strengthening, flexibility, endurance, ROM of scapular, scapulothoracic and UE control with self care, reaching, carrying, lifting, house/yardwork, driving/computer work  [] (99800) Reviewed/Progressed HEP activities related to improving balance, coordination, kinesthetic sense, posture, motor skill, proprioception of scapular, scapulothoracic and UE control with self care, reaching, carrying, lifting, house/yardwork, driving/computer work      Manual Treatments:  PROM / STM / Oscillations-Mobs:  G-I, II, III, IV (PA's, Inf., Post.)  [x] (44776) Provided manual therapy to mobilize soft tissue/joints of cervical/CT, scapular GHJ and UE for the purpose of modulating pain, promoting relaxation,  increasing ROM, reducing/eliminating soft tissue swelling/inflammation/restriction, improving soft tissue extensibility and allowing for proper ROM for normal function with self care, reaching, carrying, lifting, house/yardwork, driving/computer work    Modalities:      Charges:  Timed Code Treatment Minutes: 10   Total Treatment Minutes: 10       [] EVAL (LOW) 08710 (typically 20 minutes face-to-face)  [] EVAL (MOD) 01164 (typically 30 minutes face-to-face)  [] EVAL (HIGH) 70337 (typically 45 minutes face-to-face)  [] RE-EVAL     [] WD(68313) x                      [] IONTO (18414)  [] NMR (57590) x     [] VASO (84367)  [x] Manual (45484) x 1    [] Other:  [] TA (05829)x     [] Mech Traction (59456)  [] ES(attended) (74740)     [] ES (un) (29111): If BWC Please Indicate Time In/Out  CPT Code Time in Time out                                     GOALS:  Patient stated goal: Regain full use of R arm.   [x] Progressing: [] Met: [] Not Met: [] Adjusted    Therapist goals for Patient:   Short Term Goals: To be achieved in: 2 weeks  1. Independent in HEP and progression per patient tolerance, in order to prevent re-injury. [x] Progressing: [] Met: [] Not Met: [] Adjusted  2. Patient will have a decrease in pain to facilitate improvement in movement, function, and ADLs as indicated by Functional Deficits. [x] Progressing: [] Met: [] Not Met: [] Adjusted    Long Term Goals: To be achieved in: 4 weeks  1. Disability index score of 20% or less for the DASH to assist with reaching prior level of function. [x] Progressing: [] Met: [] Not Met: [] Adjusted  2. Patient will demonstrate increased R shoulder AROM to allow for proper joint functioning as indicated by patients Functional Deficits. [x] Progressing: [] Met: [] Not Met: [] Adjusted  3. Patient will demonstrate an increase in R shoulder strength to within 5# HHD compared to uninvolved L UE to allow for proper functional mobility as indicated by patients Functional Deficits. [x] Progressing: [] Met: [] Not Met: [] Adjusted  4. Patient will be able to perform all ADLs, self care tasks, and household chores using R UE without increased symptoms or restriction. [x] Progressing: [] Met: [] Not Met: [] Adjusted  5. Patient will be able to perform all work tasks using R UE without increased symptoms or restriction. [x] Progressing: [] Met: [] Not Met: [] Adjusted     Progression Towards Functional goals:  [x] Patient is progressing as expected towards functional goals listed. [] Progression is slowed due to complexities listed. [] Progression has been slowed due to co-morbidities. [] Plan just implemented, too soon to assess goals progression  [] Other:     ASSESSMENT:    Patient arrived to PT session 35 minutes late. All patient had time for was manual therapy techniques today.      Return to Play: (if applicable)    []  Stage 1: Intro to Strength   []  Stage 2: Dynamic Strength and Intro to Plyometrics    []  Stage 3: Advanced Plyometrics and Intro to Throwing     []  Stage 4: Sport specific Training/Return to Sport     []  Ready to Return to Play, [employer]   []  Not Ready for Return to Sports   Comments:      Treatment/Activity Tolerance:  [x] Patient tolerated treatment well [x] Patient limited by fatique  [] Patient limited by pain    [] Patient limited by other medical complications   [] Other:     Overall Progression Towards Functional goals/ Treatment Progress Update:  [x] Patient is progressing as expected towards functional goals listed. [] Progression is slowed due to complexities/Impairments listed. [] Progression has been slowed due to co-morbidities. [] Plan just implemented, too soon to assess goals progression <30days   [] Goals require adjustment due to lack of progress  [] Patient is not progressing as expected and requires additional follow up with physician  [] Other    Prognosis for POC: [x] Good [] Fair  [] Poor    Patient requires continued skilled intervention: [x] Yes  [] No      PLAN:  Progress R shoulder ROM into OH ranges and R shoulder strength and stability as william. [x] Continue per plan of care [] Alter current plan (see comments)  [] Plan of care initiated [] Hold pending MD visit [] Discharge    Electronically signed by: Linda Dominguez, [place], [id number]       Note: If patient does not return for scheduled/recommended follow up visits, this note will serve as a discharge from care along with the most recent update on progress.

## 2020-11-25 ENCOUNTER — APPOINTMENT (OUTPATIENT)
Dept: PHYSICAL THERAPY | Age: 45
End: 2020-11-25
Payer: COMMERCIAL

## 2020-11-30 ENCOUNTER — HOSPITAL ENCOUNTER (OUTPATIENT)
Dept: PHYSICAL THERAPY | Age: 45
Setting detail: THERAPIES SERIES
Discharge: HOME | End: 2020-11-30
Payer: COMMERCIAL

## 2020-11-30 PROCEDURE — 97140 MANUAL THERAPY 1/> REGIONS: CPT

## 2020-11-30 PROCEDURE — 97110 THERAPEUTIC EXERCISES: CPT

## 2020-11-30 NOTE — FLOWSHEET NOTE
[facility]          Physical Therapy Treatment Note/ Progress Report:     Date:  2020    Patient Name:  Isai Chavarria    :  1975  MRN: [id number]  Medical/Treatment Diagnosis Information:  · Diagnosis: Z47.89 Encounter for orthopedic aftercare; M75.111 Incomplete R RTC tear; M25.511 R shoulder pain  · Treatment Diagnosis: Z47.89 Encounter for orthopedic aftercare; M75.111 Incomplete R RTC tear; M25.511 R shoulder pain  Insurance/Certification information:  PT Insurance Information: [facility] [place] - 61 PT visits/0 used  Physician Information:  Referring Practitioner: Lesly Lopez of care signed (Y/N):     Date of Patient follow up with Physician:      Progress Report: []  Yes  [x]  No     Functional Scale: 66% disability - Pam Galicia   Date:  20    Date Range for reporting period:  Beginnin20  Ending:  NA    Progress report due (10 Rx/or 30 days whichever is less): 79     Recertification due (POC duration/ or 90 days whichever is less): 20    Visit # Insurance Allowable Auth Needed    19   [facility] [place] - 61 PT visits/0 used []Yes    [x]No     Pain level:  0/10 at start of session; 5/10 with ROM/stretching exercises     SUBJECTIVE:  Patient sees Dr. Lynsey Oneal for follow up this Wednesday. Patient hoping Dr. Lynsey Oneal will clear her to go back to work at [employer]. OBJECTIVE:    Observation:    10/1/20 Rt bicep with mild ecchymosis present. Pt observed keeping arm guarded against her side even when the sling is off.  10/20/20  Rt shoulder observed  in guarded state in supine position while on wedge   20 RT shoulder not as guarded today with PROM. Flex  ~ 100  ABD ~ 105 ER 45 @90   IR 45  @90   Test measurements:     (20):     Lexx Cabanyne: 66%   Left Right    Shoulder Flex 180 142   Shoulder Abd (Scapular plane) 180 165   Shoulder ER 70 @ 90 55 @ 90   Shoulder  @ 90 48 @90                           Strength  Left Right   Shoulder Flex TBA TBA   Shoulder Abd TBA TBA   Shoulder ER TBA TBA   Shoulder IR TBA TBA     RESTRICTIONS/PRECAUTIONS: Situs inversus. Heterotaxy syndrome. Type II diabetes. High BP. Previous R & L knee scopes. [place] barger tear on esophagus. R shoulder scope, RTC repair & augmentation, SAD, biceps tenodesis 9/17/20. Exercises/Interventions:   Therapeutic Exercise  Min:  25 Wt / Resistance Sets/sec Reps Notes   Pulleys scaption/ flexion   3 min       Supine punch  7 ounces  2 10 ^ NPV   IR towel stretch behind back  5s 10      TB rows/pulldowns  TB ER/IR   TB lat pulldown (high row) Green  Red/ Green  Green  2 10    Supine cane flex/ER  10s 10each    Supine cane press  2# 2 10    Supine SA punch 2# 2 10    Sidelying ER  Sidelying punch  1#  1#  2 10    No monies RED 2 10    Prone rows 2# extensions   Prone Habd 1#  x20 2 10 each    Wall slides flexion  5s 10    SWB flexion   5s 10    Cable column extension 5# 2 10    Cable column rows 5# 2 10    TB IR/ER red 2 10 each    Active flexion & scaption  1 10 each Mirror feedback          Manual Intervention  Min:  20       R shoulder PROM    20 min                                              NMR Re-education  Min:  0       Body Blade ER/IR   Yellow    10s 5                                                         Therapeutic Exercise and NMR EXR  [x] (88695) Provided verbal/tactile cueing for activities related to strengthening, flexibility, endurance, ROM  for improvements in scapular, scapulothoracic and UE control with self care, reaching, carrying, lifting, house/yardwork, driving/computer work. [x] (42976) Provided verbal/tactile cueing for activities related to improving balance, coordination, kinesthetic sense, posture, motor skill, proprioception  to assist with  scapular, scapulothoracic and UE control with self care, reaching, carrying, lifting, house/yardwork, driving/computer work.     Therapeutic Activities:    [] (82875 or 02795) Provided verbal/tactile cueing for activities related to improving balance, coordination, kinesthetic sense, posture, motor skill, proprioception and motor activation to allow for proper function of scapular, scapulothoracic and UE control with self care, carrying, lifting, driving/computer work. Home Exercise Program:    [] (64329) Reviewed/Progressed HEP activities related to strengthening, flexibility, endurance, ROM of scapular, scapulothoracic and UE control with self care, reaching, carrying, lifting, house/yardwork, driving/computer work  [] (91726) Reviewed/Progressed HEP activities related to improving balance, coordination, kinesthetic sense, posture, motor skill, proprioception of scapular, scapulothoracic and UE control with self care, reaching, carrying, lifting, house/yardwork, driving/computer work      Manual Treatments:  PROM / STM / Oscillations-Mobs:  G-I, II, III, IV (PA's, Inf., Post.)  [x] (03389) Provided manual therapy to mobilize soft tissue/joints of cervical/CT, scapular GHJ and UE for the purpose of modulating pain, promoting relaxation,  increasing ROM, reducing/eliminating soft tissue swelling/inflammation/restriction, improving soft tissue extensibility and allowing for proper ROM for normal function with self care, reaching, carrying, lifting, house/yardwork, driving/computer work    Modalities:  Game Ready to R shoulder for pain/inflammation/edema - 15 minutes (personal preference).     Charges:  Timed Code Treatment Minutes: 45   Total Treatment Minutes: 45       [] EVAL (LOW) 27643 (typically 20 minutes face-to-face)  [] EVAL (MOD) 38714 (typically 30 minutes face-to-face)  [] EVAL (HIGH) 72571 (typically 45 minutes face-to-face)  [] RE-EVAL     [x] RV(89337) x 2                     [] IONTO (34462)  [] NMR (61048) x     [] VASO (36543)  [x] Manual (33823) x 1    [] Other:  [] TA (84471)x     [] Mech Traction (15966)  [] ES(attended) (61330) [] ES (un) (90381): If BWC Please Indicate Time In/Out  CPT Code Time in Time out                                     GOALS:  Patient stated goal: Regain full use of R arm. [x] Progressing: [] Met: [] Not Met: [] Adjusted    Therapist goals for Patient:   Short Term Goals: To be achieved in: 2 weeks  1. Independent in HEP and progression per patient tolerance, in order to prevent re-injury. [x] Progressing: [] Met: [] Not Met: [] Adjusted  2. Patient will have a decrease in pain to facilitate improvement in movement, function, and ADLs as indicated by Functional Deficits. [x] Progressing: [] Met: [] Not Met: [] Adjusted    Long Term Goals: To be achieved in: 4 weeks  1. Disability index score of 20% or less for the DASH to assist with reaching prior level of function. [x] Progressing: [] Met: [] Not Met: [] Adjusted  2. Patient will demonstrate increased R shoulder AROM to allow for proper joint functioning as indicated by patients Functional Deficits. [x] Progressing: [] Met: [] Not Met: [] Adjusted  3. Patient will demonstrate an increase in R shoulder strength to within 5# HHD compared to uninvolved L UE to allow for proper functional mobility as indicated by patients Functional Deficits. [x] Progressing: [] Met: [] Not Met: [] Adjusted  4. Patient will be able to perform all ADLs, self care tasks, and household chores using R UE without increased symptoms or restriction. [x] Progressing: [] Met: [] Not Met: [] Adjusted  5. Patient will be able to perform all work tasks using R UE without increased symptoms or restriction. [x] Progressing: [] Met: [] Not Met: [] Adjusted     Progression Towards Functional goals:  [x] Patient is progressing as expected towards functional goals listed. [] Progression is slowed due to complexities listed. [] Progression has been slowed due to co-morbidities.   [] Plan just implemented, too soon to assess goals progression  [] Other:     ASSESSMENT:    Patient additional follow up with physician  [] Other    Prognosis for POC: [x] Good [] Fair  [] Poor    Patient requires continued skilled intervention: [x] Yes  [] No      PLAN:  Progress R shoulder ROM into OH ranges and R shoulder strength and stability as william. [x] Continue per plan of care [] Alter current plan (see comments)  [] Plan of care initiated [] Hold pending MD visit [] Discharge    Electronically signed by: [place], [id number]       Note: If patient does not return for scheduled/recommended follow up visits, this note will serve as a discharge from care along with the most recent update on progress.

## 2020-12-02 ENCOUNTER — HOSPITAL ENCOUNTER (OUTPATIENT)
Dept: PHYSICAL THERAPY | Age: 45
Setting detail: THERAPIES SERIES
Discharge: HOME | End: 2020-12-02
Payer: COMMERCIAL

## 2020-12-02 ENCOUNTER — OFFICE VISIT (OUTPATIENT)
Dept: ORTHOPEDIC SURGERY | Age: 45
End: 2020-12-02

## 2020-12-02 PROCEDURE — 97140 MANUAL THERAPY 1/> REGIONS: CPT | Performed by: SPECIALIST/TECHNOLOGIST

## 2020-12-02 PROCEDURE — 97110 THERAPEUTIC EXERCISES: CPT | Performed by: SPECIALIST/TECHNOLOGIST

## 2020-12-02 PROCEDURE — 99024 POSTOP FOLLOW-UP VISIT: CPT | Performed by: ORTHOPAEDIC SURGERY

## 2020-12-02 NOTE — FLOWSHEET NOTE
[facility]          Physical Therapy Treatment Note/ Progress Report:     Date:  2020    Patient Name:  Desire Cohen    :  1975  MRN: [id number]  Medical/Treatment Diagnosis Information:  · Diagnosis: Z47.89 Encounter for orthopedic aftercare; M75.111 Incomplete R RTC tear; M25.511 R shoulder pain  · Treatment Diagnosis: Z47.89 Encounter for orthopedic aftercare; M75.111 Incomplete R RTC tear; M25.511 R shoulder pain  Insurance/Certification information:  PT Insurance Information: [facility] [place] - 61 PT visits/0 used  Physician Information:  Referring Practitioner: Joao Guerrero  Plan of care signed (Y/N):     Date of Patient follow up with Physician:      Progress Report: []  Yes  [x]  No     Functional Scale: 66% disability - Mey Barbosa   Date:  20    Date Range for reporting period:  Beginnin20  Ending:  NA    Progress report due (10 Rx/or 30 days whichever is less):      Recertification due (POC duration/ or 90 days whichever is less): 20    Visit # Insurance Allowable Auth Needed    20    [facility] [place] - 61 PT visits/0 used []Yes    [x]No     Pain level:  0/10 at start of session; 5/10 with ROM/stretching exercises     SUBJECTIVE: Pt arrived 15 minutes late for appt today. Sees [facility]  11 weeks s/p tomorrow. Plan to go back to work but has to lift up to 25# and is hopeful for next week. OBJECTIVE:    Observation:    10/1/20 Rt bicep with mild ecchymosis present. Pt observed keeping arm guarded against her side even when the sling is off.  10/20/20  Rt shoulder observed  in guarded state in supine position while on wedge   20 RT shoulder not as guarded today with PROM. Flex  ~ 100  ABD ~ 105 ER 45 @90   IR 45  @90   Test measurements:     (20):     Heidy Ramirez: 66%   Left Right    Shoulder Flex 180 142   Shoulder Abd (Scapular plane) 180 165   Shoulder ER 70 @ 90 55 @ GOALS:  Patient stated goal: Regain full use of R arm. [x] Progressing: [] Met: [] Not Met: [] Adjusted    Therapist goals for Patient:   Short Term Goals: To be achieved in: 2 weeks  1. Independent in HEP and progression per patient tolerance, in order to prevent re-injury. [x] Progressing: [] Met: [] Not Met: [] Adjusted  2. Patient will have a decrease in pain to facilitate improvement in movement, function, and ADLs as indicated by Functional Deficits. [x] Progressing: [] Met: [] Not Met: [] Adjusted    Long Term Goals: To be achieved in: 4 weeks  1. Disability index score of 20% or less for the DASH to assist with reaching prior level of function. [x] Progressing: [] Met: [] Not Met: [] Adjusted  2. Patient will demonstrate increased R shoulder AROM to allow for proper joint functioning as indicated by patients Functional Deficits. [x] Progressing: [] Met: [] Not Met: [] Adjusted  3. Patient will demonstrate an increase in R shoulder strength to within 5# HHD compared to uninvolved L UE to allow for proper functional mobility as indicated by patients Functional Deficits. [x] Progressing: [] Met: [] Not Met: [] Adjusted  4. Patient will be able to perform all ADLs, self care tasks, and household chores using R UE without increased symptoms or restriction. [x] Progressing: [] Met: [] Not Met: [] Adjusted  5. Patient will be able to perform all work tasks using R UE without increased symptoms or restriction. [x] Progressing: [] Met: [] Not Met: [] Adjusted     Progression Towards Functional goals:  [x] Patient is progressing as expected towards functional goals listed. [] Progression is slowed due to complexities listed. [] Progression has been slowed due to co-morbidities. [] Plan just implemented, too soon to assess goals progression  [] Other:     ASSESSMENT:     Pt has continued stiffness with end range flexion and Ir ROM.  Pt eager to RTW but continues to have deficits with RTC and UE/ OH weakness. Pt program limited today due to time constraints. Continued to encourage her to really push into the range especially because she is feeling an uncomfortable tightness related to tissue restrictions rather than actual pain related to irritation/inflammation. Pt was cleared by physician to RTW  Patient is still very weak. Would suggest continued focus on PT to address remaining ROM and strength restrictions before clearance back to work, which requires her to lift up to 25#. Return to Play: (if applicable)     []  Stage 1: Intro to Strength   []  Stage 2: Dynamic Strength and Intro to Plyometrics    []  Stage 3: Advanced Plyometrics and Intro to Throwing     []  Stage 4: Sport specific Training/Return to Sport     []  Ready to Return to Play, [employer]   []  Not Ready for Return to Sports   Comments:      Treatment/Activity Tolerance:  [x] Patient tolerated treatment well [x] Patient limited by fatique  [] Patient limited by pain    [] Patient limited by other medical complications   [] Other:     Overall Progression Towards Functional goals/ Treatment Progress Update:  [x] Patient is progressing as expected towards functional goals listed. [] Progression is slowed due to complexities/Impairments listed. [] Progression has been slowed due to co-morbidities. [] Plan just implemented, too soon to assess goals progression <30days   [] Goals require adjustment due to lack of progress  [] Patient is not progressing as expected and requires additional follow up with physician  [] Other    Prognosis for POC: [x] Good [] Fair  [] Poor    Patient requires continued skilled intervention: [x] Yes  [] No      PLAN:  Progress R shoulder ROM into OH ranges and R shoulder strength and stability as william.   [x] Continue per plan of care [] Alter current plan (see comments)  [] Plan of care initiated [] Hold pending MD visit [] Discharge    Electronically signed by: Sg Tena PTA, [id number] Note: If patient does not return for scheduled/recommended follow up visits, this note will serve as a discharge from care along with the most recent update on progress.

## 2020-12-02 NOTE — LETTER
[address]  [address] [facility] [address]  Phone: [phone]  Fax: [phone]    Rudolph Gomez MD        December 2, 2020     Patient: Mary Perla   YOB: 1975   Date of Visit: 12/2/2020       To Whom it May Concern:    Francisco J Borges was seen in my clinic on 12/2/2020. She may return back to work effective December 7 with the limitation of an 8-hour day and no lifting heavier than 5 pounds with the right upper extremity. These restrictions will be in effect for 6 weeks    If you have any questions or concerns, please don't hesitate to call.     Sincerely,         Rudolph Gomez MD

## 2020-12-02 NOTE — PROGRESS NOTES
Chief Complaint    Follow-up (right shoulder)      History of Present Illness:  Nelly Davidson is a 54 y.o. female. She is here today for follow-up for her right shoulder. She is status post right shoulder arthroscopy with rotator cuff repair. Overall doing well at this time. Made some good progress with physical therapy since last time I did see her. She would like to return back to work next week. Medical History:  Patient's medications, allergies, past medical, surgical, social and family histories were reviewed and updated as appropriate. Review of Systems:  Pertinent items are noted in HPI  Review of systems reviewed from Patient History Form dated on 12/2/20 and available in the patient's chart under the Media tab. Vital Signs: There were no vitals taken for this visit. General Exam:   Constitutional: Patient is adequately groomed with no evidence of malnutrition  DTRs: Deep tendon reflexes are intact  Mental Status: The patient is oriented to time, place and person. The patient's mood and affect are appropriate. Shoulder Examination:    Inspection: Surgical incisions well-healed about the right shoulder today    Palpation: No significant tenderness palpation about the right shoulder    Range of Motion: Active forward elevation of the right shoulder is to 140 degrees. Passively we can get her to 160 degrees. External rotation is 60 degrees    Strength: Strength is 4+ out of 5 with resisted external rotation. 4 out of 5 with isolated supraspinatus testing    Special Tests: Negative drop arm exam.  Negative Dixie's    Skin: There are no rashes, ulcerations or lesions. Gait: Normal      Additional Comments:       Additional Examinations:         Left Upper Extremity: Examination of the left upper extremity does not show any tenderness, deformity or injury. Range of motion is unremarkable. There is no gross instability. There are no rashes, ulcerations or lesions.   Strength and tone are normal.    Radiology:         Assessment : Status post right shoulder arthroscopy with rotator cuff repair    Impression:  Encounter Diagnosis   Name Primary?  S/P right rotator cuff repair Yes       Office Procedures:  No orders of the defined types were placed in this encounter. Treatment Plan: We are going to allow her to return back to work effective December 7, 2020 with restriction of a 5 pound lifting restriction with the right upper extremity. These may be 10-hour days. I will see her back in clinic in 6 weeks for follow-up. I do want her to continue with physical therapy in the meantime.

## 2020-12-04 ENCOUNTER — HOSPITAL ENCOUNTER (OUTPATIENT)
Dept: PHYSICAL THERAPY | Age: 45
Setting detail: THERAPIES SERIES
Discharge: HOME | End: 2020-12-04
Payer: COMMERCIAL
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
## 2020-12-09 ENCOUNTER — HOSPITAL ENCOUNTER (OUTPATIENT)
Dept: PHYSICAL THERAPY | Age: 45
Setting detail: THERAPIES SERIES
Discharge: HOME | End: 2020-12-09
Payer: COMMERCIAL

## 2020-12-09 PROCEDURE — 97140 MANUAL THERAPY 1/> REGIONS: CPT | Performed by: SPECIALIST/TECHNOLOGIST

## 2020-12-09 PROCEDURE — 97110 THERAPEUTIC EXERCISES: CPT | Performed by: SPECIALIST/TECHNOLOGIST

## 2020-12-09 PROCEDURE — 97016 VASOPNEUMATIC DEVICE THERAPY: CPT | Performed by: SPECIALIST/TECHNOLOGIST

## 2020-12-09 NOTE — FLOWSHEET NOTE
driving/computer work. Therapeutic Activities:    [] (49712 or 67594) Provided verbal/tactile cueing for activities related to improving balance, coordination, kinesthetic sense, posture, motor skill, proprioception and motor activation to allow for proper function of scapular, scapulothoracic and UE control with self care, carrying, lifting, driving/computer work.      Home Exercise Program:    [] (13035) Reviewed/Progressed HEP activities related to strengthening, flexibility, endurance, ROM of scapular, scapulothoracic and UE control with self care, reaching, carrying, lifting, house/yardwork, driving/computer work  [] (73477) Reviewed/Progressed HEP activities related to improving balance, coordination, kinesthetic sense, posture, motor skill, proprioception of scapular, scapulothoracic and UE control with self care, reaching, carrying, lifting, house/yardwork, driving/computer work      Manual Treatments:  PROM / STM / Oscillations-Mobs:  G-I, II, III, IV (PA's, Inf., Post.)  [x] (67682) Provided manual therapy to mobilize soft tissue/joints of cervical/CT, scapular GHJ and UE for the purpose of modulating pain, promoting relaxation,  increasing ROM, reducing/eliminating soft tissue swelling/inflammation/restriction, improving soft tissue extensibility and allowing for proper ROM for normal function with self care, reaching, carrying, lifting, house/yardwork, driving/computer work    Modalities:  Game Ready to R shoulder for pain/inflammation/edema - 15 minutes   Charges:  Timed Code Treatment Minutes: 40   Total Treatment Minutes: 55       [] EVAL (LOW) 29032 (typically 20 minutes face-to-face)  [] EVAL (MOD) 33154 (typically 30 minutes face-to-face)  [] EVAL (HIGH) 17237 (typically 45 minutes face-to-face)  [] RE-EVAL     [x] LN(16470) x 2                     [] IONTO (19641)  [] NMR (91837) x     [x] VASO (42549)  [x] Manual (83714) x 1    [] Other:  [] TA (96041)x     [] [facility] (88959)  [] ES(attended) (22334)     [] ES (un) (64738): If [facility] Please Indicate Time In/Out  CPT Code Time in Time out                                     GOALS:  Patient stated goal: Regain full use of R arm. [x] Progressing: [] Met: [] Not Met: [] Adjusted    Therapist goals for Patient:   Short Term Goals: To be achieved in: 2 weeks  1. Independent in HEP and progression per patient tolerance, in order to prevent re-injury. [x] Progressing: [] Met: [] Not Met: [] Adjusted  2. Patient will have a decrease in pain to facilitate improvement in movement, function, and ADLs as indicated by Functional Deficits. [x] Progressing: [] Met: [] Not Met: [] Adjusted    Long Term Goals: To be achieved in: 4 weeks  1. Disability index score of 20% or less for the DASH to assist with reaching prior level of function. [x] Progressing: [] Met: [] Not Met: [] Adjusted  2. Patient will demonstrate increased R shoulder AROM to allow for proper joint functioning as indicated by patients Functional Deficits. [x] Progressing: [] Met: [] Not Met: [] Adjusted  3. Patient will demonstrate an increase in R shoulder strength to within 5# HHD compared to uninvolved L UE to allow for proper functional mobility as indicated by patients Functional Deficits. [x] Progressing: [] Met: [] Not Met: [] Adjusted  4. Patient will be able to perform all ADLs, self care tasks, and household chores using R UE without increased symptoms or restriction. [x] Progressing: [] Met: [] Not Met: [] Adjusted  5. Patient will be able to perform all work tasks using R UE without increased symptoms or restriction. [x] Progressing: [] Met: [] Not Met: [] Adjusted     Progression Towards Functional goals:  [x] Patient is progressing as expected towards functional goals listed. [] Progression is slowed due to complexities listed. [] Progression has been slowed due to co-morbidities.   [] Plan just implemented, too soon to assess goals progression  [] Other: ASSESSMENT:     Pt has continued stiffness with end range flexion and Ir ROM. Pt eager to RTW but continues to have deficits with RTC and UE/ OH weakness. Continued to encourage her to really push into the range especially because she is feeling an uncomfortable tightness related to tissue restrictions rather than actual pain related to irritation/inflammation. Pt tolerated progressions with ROWs/ LPD/ bicep/ triceps/ sl extension with no reports of pain. Needed verbal cues to avoid  Using her biceps or upper traps to perform. Patient is still very weak. Would suggest continued focus on PT to address remaining ROM and strength restrictions before clearance back to work, which requires her to lift up to 25#. Needs to increase program intensity    Return to Play: (if applicable)     []  Stage 1: Intro to Strength   []  Stage 2: Dynamic Strength and Intro to Plyometrics    []  Stage 3: Advanced Plyometrics and Intro to Throwing     []  Stage 4: Sport specific Training/Return to Sport     []  Ready to Return to Play, [employer]   []  Not Ready for Return to Sports   Comments:      Treatment/Activity Tolerance:  [x] Patient tolerated treatment well [x] Patient limited by fatique  [] Patient limited by pain    [] Patient limited by other medical complications   [] Other:     Overall Progression Towards Functional goals/ Treatment Progress Update:  [x] Patient is progressing as expected towards functional goals listed. [] Progression is slowed due to complexities/Impairments listed. [] Progression has been slowed due to co-morbidities.   [] Plan just implemented, too soon to assess goals progression <30days   [] Goals require adjustment due to lack of progress  [] Patient is not progressing as expected and requires additional follow up with physician  [] Other    Prognosis for POC: [x] Good [] Fair  [] Poor    Patient requires continued skilled intervention: [x] Yes  [] No      PLAN:  Progress R shoulder ROM into OH ranges and R shoulder strength and stability as william. [x] Continue per plan of care [] Alter current plan (see comments)  [] Plan of care initiated [] Hold pending MD visit [] Discharge    Electronically signed by: Bucky Rivera PTA, [id number]       Note: If patient does not return for scheduled/recommended follow up visits, this note will serve as a discharge from care along with the most recent update on progress.

## 2020-12-10 ENCOUNTER — TELEPHONE (OUTPATIENT)
Dept: ORTHOPEDIC SURGERY | Age: 45
End: 2020-12-10

## 2020-12-11 ENCOUNTER — TELEPHONE (OUTPATIENT)
Dept: ORTHOPEDIC SURGERY | Age: 45
End: 2020-12-11

## 2020-12-11 ENCOUNTER — HOSPITAL ENCOUNTER (OUTPATIENT)
Dept: PHYSICAL THERAPY | Age: 45
Setting detail: THERAPIES SERIES
Discharge: HOME | End: 2020-12-11
Payer: COMMERCIAL

## 2020-12-11 PROCEDURE — 97140 MANUAL THERAPY 1/> REGIONS: CPT | Performed by: SPECIALIST/TECHNOLOGIST

## 2020-12-11 PROCEDURE — 97110 THERAPEUTIC EXERCISES: CPT | Performed by: SPECIALIST/TECHNOLOGIST

## 2020-12-11 NOTE — FLOWSHEET NOTE
[facility]          Physical Therapy Treatment Note/ Progress Report:     Date:  2020    Patient Name:  Pedro Velazco    :  1975  MRN: [id number]  Medical/Treatment Diagnosis Information:  · Diagnosis: Z47.89 Encounter for orthopedic aftercare; M75.111 Incomplete R RTC tear; M25.511 R shoulder pain  · Treatment Diagnosis: Z47.89 Encounter for orthopedic aftercare; M75.111 Incomplete R RTC tear; M25.511 R shoulder pain  Insurance/Certification information:  PT Insurance Information: Medical [employer]man - 61 PT visits/0 used  Physician Information:  Referring Practitioner: Erin Murguia  Plan of care signed (Y/N):     Date of Patient follow up with Physician:      Progress Report: []  Yes  [x]  No     Functional Scale: 66% disability - Shameka Spencer   Date:  20    Date Range for reporting period:  Beginnin20  Ending:  NA    Progress report due (10 Rx/or 30 days whichever is less):      Recertification due (POC duration/ or 90 days whichever is less): 20    Visit # Insurance Allowable Auth Needed      [employer] - 61 PT visits/0 used []Yes    [x]No     Pain level:  0/10 at start of session; 5/10 with ROM/stretching exercises     SUBJECTIVE:  S/p 12 weeks tomorrow. Rt shoulder had some increased soreness after last session related to using machines etc. Felt like it was a good workout and necessary for RTW. OBJECTIVE:    Observation:    10/1/20 Rt bicep with mild ecchymosis present. Pt observed keeping arm guarded against her side even when the sling is off.  10/20/20  Rt shoulder observed  in guarded state in supine position while on wedge   20 RT shoulder not as guarded today with PROM. Flex  ~ 100  ABD ~ 105 ER 45 @90   IR 45  @90   Test measurements:     (20):     Deno Snellen: 66%   Left Right    Shoulder Flex 180 142   Shoulder Abd (Scapular plane) 180 165   Shoulder ER 70 @ 90 55 @ 90   Shoulder  @ 90 48  @90                           Strength  Left Right   Shoulder Flex TBA TBA   Shoulder Abd TBA TBA   Shoulder ER TBA TBA   Shoulder IR TBA TBA     RESTRICTIONS/PRECAUTIONS: Situs inversus. Heterotaxy syndrome. Type II diabetes. High BP. Previous R & L knee scopes. [place] barger tear on esophagus. R shoulder scope, RTC repair & augmentation, SAD, biceps tenodesis 9/17/20    Exercises/Interventions:   Therapeutic Exercise  Min:  25 Wt / Resistance Sets/sec Reps Notes   Pulleys scaption/ flexion   3 min       Supine punch  2#   2 10    IR towel stretch behind back/IR sleeper    Supine  Hammock     30\" x3 5s 10      TB rows/pulldowns  TB ER/IR   TB lat pulldown (high row)   Red/ Green   2 10    Supine cane flex/ER  10s 10each    Supine cane press  3# 2 10    Supine SA punch 2# 2 10    Sidelying ER  Sidelying punch/ ABD  1#  1#  3 10    No monies RED 2 10    Standing rows 3#      Prone Habd    Wall slides flexion  5s 10 HEP   Biceps/ Triceps 2#    Cable column extension   Rows  LPD   5#   20#  25# 2 10ea  TB IR/ER Active flexion & scaption HEP       Manual Intervention  Min:  20       R shoulder PROM    20 min                                              NMR Re-education  Min:  0       Body Blade ER/IR   Yellow    10s 5                                                         Therapeutic Exercise and NMR EXR  [x] (69784) Provided verbal/tactile cueing for activities related to strengthening, flexibility, endurance, ROM  for improvements in scapular, scapulothoracic and UE control with self care, reaching, carrying, lifting, house/yardwork, driving/computer work. [x] (26157) Provided verbal/tactile cueing for activities related to improving balance, coordination, kinesthetic sense, posture, motor skill, proprioception  to assist with  scapular, scapulothoracic and UE control with self care, reaching, carrying, lifting, house/yardwork, driving/computer work.     Therapeutic Activities: [] (54282 or 95714) Provided verbal/tactile cueing for activities related to improving balance, coordination, kinesthetic sense, posture, motor skill, proprioception and motor activation to allow for proper function of scapular, scapulothoracic and UE control with self care, carrying, lifting, driving/computer work. Home Exercise Program:    [] (84257) Reviewed/Progressed HEP activities related to strengthening, flexibility, endurance, ROM of scapular, scapulothoracic and UE control with self care, reaching, carrying, lifting, house/yardwork, driving/computer work  [] (13243) Reviewed/Progressed HEP activities related to improving balance, coordination, kinesthetic sense, posture, motor skill, proprioception of scapular, scapulothoracic and UE control with self care, reaching, carrying, lifting, house/yardwork, driving/computer work      Manual Treatments:  PROM / STM / Oscillations-Mobs:  G-I, II, III, IV (PA's, Inf., Post.)  [x] (26283) Provided manual therapy to mobilize soft tissue/joints of cervical/CT, scapular GHJ and UE for the purpose of modulating pain, promoting relaxation,  increasing ROM, reducing/eliminating soft tissue swelling/inflammation/restriction, improving soft tissue extensibility and allowing for proper ROM for normal function with self care, reaching, carrying, lifting, house/yardwork, driving/computer work    Modalities:  Game Ready to R shoulder for pain/inflammation/edema - 15 minutes   Charges:  Timed Code Treatment Minutes: 40   Total Treatment Minutes: 55       [] EVAL (LOW) 08366 (typically 20 minutes face-to-face)  [] EVAL (MOD) 99593 (typically 30 minutes face-to-face)  [] EVAL (HIGH) 17406 (typically 45 minutes face-to-face)  [] RE-EVAL     [x] YH(44691) x 2                     [] IONTO (59975)  [] NMR (96381) x     [x] VASO (48447)  [x] Manual (16385) x 1    [] Other:  [] TA (85041)x     [] [facility] (95629)  [] ES(attended) (76134)     [] ES (un) (83249):      If [facility] Please Indicate Time In/Out  CPT Code Time in Time out                                     GOALS:  Patient stated goal: Regain full use of R arm. [x] Progressing: [] Met: [] Not Met: [] Adjusted    Therapist goals for Patient:   Short Term Goals: To be achieved in: 2 weeks  1. Independent in HEP and progression per patient tolerance, in order to prevent re-injury. [x] Progressing: [] Met: [] Not Met: [] Adjusted  2. Patient will have a decrease in pain to facilitate improvement in movement, function, and ADLs as indicated by Functional Deficits. [x] Progressing: [] Met: [] Not Met: [] Adjusted    Long Term Goals: To be achieved in: 4 weeks  1. Disability index score of 20% or less for the DASH to assist with reaching prior level of function. [x] Progressing: [] Met: [] Not Met: [] Adjusted  2. Patient will demonstrate increased R shoulder AROM to allow for proper joint functioning as indicated by patients Functional Deficits. [x] Progressing: [] Met: [] Not Met: [] Adjusted  3. Patient will demonstrate an increase in R shoulder strength to within 5# HHD compared to uninvolved L UE to allow for proper functional mobility as indicated by patients Functional Deficits. [x] Progressing: [] Met: [] Not Met: [] Adjusted  4. Patient will be able to perform all ADLs, self care tasks, and household chores using R UE without increased symptoms or restriction. [x] Progressing: [] Met: [] Not Met: [] Adjusted  5. Patient will be able to perform all work tasks using R UE without increased symptoms or restriction. [x] Progressing: [] Met: [] Not Met: [] Adjusted     Progression Towards Functional goals:  [x] Patient is progressing as expected towards functional goals listed. [] Progression is slowed due to complexities listed. [] Progression has been slowed due to co-morbidities. [] Plan just implemented, too soon to assess goals progression  [] Other:     ASSESSMENT:     Pt has continued stiffness with  IR ROM. Pt eager to RTW but continues to have deficits with RTC and UE/ OH weakness. Continued to encourage her to really push into the range especially because she is feeling an uncomfortable tightness related to tissue restrictions rather than actual pain related to irritation/inflammation. Pt tolerated progressions with ROWs/ LPD/ bicep/ triceps/ sl extension with no reports of pain. Needed verbal cues to avoid  Using her biceps or upper traps to perform. Patient is still very weak. Would suggest continued focus on PT to address remaining ROM and strength restrictions before clearance back to work, which requires her to lift up to 25#. Needs to increase program intensity    Return to Play: (if applicable)     []  Stage 1: Intro to Strength   []  Stage 2: Dynamic Strength and Intro to Plyometrics    []  Stage 3: Advanced Plyometrics and Intro to Throwing     []  Stage 4: Sport specific Training/Return to Sport     []  Ready to Return to Play, [employer]   []  Not Ready for Return to Sports   Comments:      Treatment/Activity Tolerance:  [x] Patient tolerated treatment well [x] Patient limited by fatique  [] Patient limited by pain    [] Patient limited by other medical complications   [] Other:     Overall Progression Towards Functional goals/ Treatment Progress Update:  [x] Patient is progressing as expected towards functional goals listed. [] Progression is slowed due to complexities/Impairments listed. [] Progression has been slowed due to co-morbidities. [] Plan just implemented, too soon to assess goals progression <30days   [] Goals require adjustment due to lack of progress  [] Patient is not progressing as expected and requires additional follow up with physician  [] Other    Prognosis for POC: [x] Good [] Fair  [] Poor    Patient requires continued skilled intervention: [x] Yes  [] No      PLAN:  Progress R shoulder ROM into OH ranges and R shoulder strength and stability as william.   [x] Continue

## 2020-12-14 ENCOUNTER — HOSPITAL ENCOUNTER (OUTPATIENT)
Dept: PHYSICAL THERAPY | Age: 45
Setting detail: THERAPIES SERIES
Discharge: HOME | End: 2020-12-14
Payer: COMMERCIAL

## 2020-12-14 PROCEDURE — 97140 MANUAL THERAPY 1/> REGIONS: CPT

## 2020-12-14 PROCEDURE — 97110 THERAPEUTIC EXERCISES: CPT

## 2020-12-14 NOTE — FLOWSHEET NOTE
[facility]          Physical Therapy Treatment Note/ Progress Report:     Date:  2020    Patient Name:  Gayle Seip    :  1975  MRN: [id number]  Medical/Treatment Diagnosis Information:  · Diagnosis: Z47.89 Encounter for orthopedic aftercare; M75.111 Incomplete R RTC tear; M25.511 R shoulder pain  · Treatment Diagnosis: Z47.89 Encounter for orthopedic aftercare; M75.111 Incomplete R RTC tear; M25.511 R shoulder pain  Insurance/Certification information:  PT Insurance Information: [facility] [place] - 61 PT visits/0 used  Physician Information:  Referring Practitioner: Eri Painter  Plan of care signed (Y/N):     Date of Patient follow up with Physician:      Progress Report: []  Yes  [x]  No     Functional Scale: 66% disability - Korina Melendez   Date:  20    Date Range for reporting period:  Beginnin20  Ending:  NA    Progress report due (10 Rx/or 30 days whichever is less):      Recertification due (POC duration/ or 90 days whichever is less): 20    Visit # Insurance Allowable Auth Needed    23  [facility] [place] - 60 PT visits/0 used []Yes    [x]No     Pain level:  0/10 at start of session; 5/10 with ROM/stretching exercises     SUBJECTIVE:  Patient 5-10 minutes late to session today. States that she will be dropping her POC down to 2x per week instead of 3x per week. Patient frustrated that she has not been cleared to go back to work because she is not doing as well financially as she would like to be during this holiday season because she is not working for another month. OBJECTIVE:    Observation:    10/1/20 Rt bicep with mild ecchymosis present. Pt observed keeping arm guarded against her side even when the sling is off.  10/20/20  Rt shoulder observed  in guarded state in supine position while on wedge   20 RT shoulder not as guarded today with PROM.  Flex  ~ 100  ABD ~ 105 ER 45 @90   IR 45  @90   Test measurements:     (11/13/20): Lexx Doyne: 66%   Left Right    Shoulder Flex 180 142   Shoulder Abd (Scapular plane) 180 165   Shoulder ER 70 @ 90 55 @ 90   Shoulder  @ 90 48  @90                           Strength  Left Right   Shoulder Flex TBA TBA   Shoulder Abd TBA TBA   Shoulder ER TBA TBA   Shoulder IR TBA TBA     RESTRICTIONS/PRECAUTIONS: Situs inversus. Heterotaxy syndrome. Type II diabetes. High BP. Previous R & L knee scopes. [place] barger tear on esophagus. R shoulder scope, RTC repair & augmentation, SAD, biceps tenodesis 9/17/20    Exercises/Interventions:   Therapeutic Exercise  Min:  25 Wt / Resistance Sets/sec Reps Notes   Pulleys scaption/ flexion   3 min    Supine punch  2#   2 10    IR towel stretch behind back/IR Sleeper Stretch  Supine  Hammock      5s  15s  1 min 10  5  3      Supine cane flex/ER  10s 10each    Supine cane press  3# 2 10    Supine SA punch 2# 2 10    Sidelying ER  Sidelying punch/ABD  1#  1#  3 10    No monies RED 2 10    Standing rows 3#      Prone Habd    Wall slides flexion  5s 10 HEP   Wall wash OH flexion to abduction  1 10    Biceps/ Triceps 2# 2 10x    Cable column extension   Rows  Lat pulldown 5#   10#  25# 2 10 each TB IR/ER red 2 10 each    Active flexion & scaption HEP1 10 each Mirror feedback          Manual Intervention  Min:  20       R shoulder PROM    20 min                                              NMR Re-education  Min:  0       Body Blade ER/IR   Yellow    10s 5    Prone Ts   nv                                                  Therapeutic Exercise and NMR EXR  [x] (33537) Provided verbal/tactile cueing for activities related to strengthening, flexibility, endurance, ROM  for improvements in scapular, scapulothoracic and UE control with self care, reaching, carrying, lifting, house/yardwork, driving/computer work.     [x] (59641) Provided verbal/tactile cueing for activities related to improving balance, coordination, kinesthetic sense, posture, motor skill, proprioception  to assist with  scapular, scapulothoracic and UE control with self care, reaching, carrying, lifting, house/yardwork, driving/computer work. Therapeutic Activities:    [] (53336 or 38037) Provided verbal/tactile cueing for activities related to improving balance, coordination, kinesthetic sense, posture, motor skill, proprioception and motor activation to allow for proper function of scapular, scapulothoracic and UE control with self care, carrying, lifting, driving/computer work. Home Exercise Program:    [] (86020) Reviewed/Progressed HEP activities related to strengthening, flexibility, endurance, ROM of scapular, scapulothoracic and UE control with self care, reaching, carrying, lifting, house/yardwork, driving/computer work  [] (78378) Reviewed/Progressed HEP activities related to improving balance, coordination, kinesthetic sense, posture, motor skill, proprioception of scapular, scapulothoracic and UE control with self care, reaching, carrying, lifting, house/yardwork, driving/computer work      Manual Treatments:  PROM / STM / Oscillations-Mobs:  G-I, II, III, IV (PA's, Inf., Post.)  [x] (14381) Provided manual therapy to mobilize soft tissue/joints of cervical/CT, scapular GHJ and UE for the purpose of modulating pain, promoting relaxation,  increasing ROM, reducing/eliminating soft tissue swelling/inflammation/restriction, improving soft tissue extensibility and allowing for proper ROM for normal function with self care, reaching, carrying, lifting, house/yardwork, driving/computer work    Modalities:  Game Ready to R shoulder for pain/inflammation/edema - 15 minutes (personal preference).    Charges:  Timed Code Treatment Minutes: 45   Total Treatment Minutes: 45       [] EVAL (LOW) 50850 (typically 20 minutes face-to-face)  [] EVAL (MOD) 81116 (typically 30 minutes face-to-face)  [] EVAL (HIGH) 18390 (typically 45 minutes face-to-face)  [] RE-EVAL     [x] QL(61578) x 2                     [] IONTO (78630)  [] NMR (41507) x     [] VASO (84759)  [x] Manual (54789) x 1    [] Other:  [] TA (29238)x     [] Mech Traction (28949)  [] ES(attended) (51594)     [] ES (un) (75571): If [facility] Please Indicate Time In/Out  CPT Code Time in Time out                                     GOALS:  Patient stated goal: Regain full use of R arm. [x] Progressing: [] Met: [] Not Met: [] Adjusted    Therapist goals for Patient:   Short Term Goals: To be achieved in: 2 weeks  1. Independent in HEP and progression per patient tolerance, in order to prevent re-injury. [x] Progressing: [] Met: [] Not Met: [] Adjusted  2. Patient will have a decrease in pain to facilitate improvement in movement, function, and ADLs as indicated by Functional Deficits. [x] Progressing: [] Met: [] Not Met: [] Adjusted    Long Term Goals: To be achieved in: 4 weeks  1. Disability index score of 20% or less for the DASH to assist with reaching prior level of function. [x] Progressing: [] Met: [] Not Met: [] Adjusted  2. Patient will demonstrate increased R shoulder AROM to allow for proper joint functioning as indicated by patients Functional Deficits. [x] Progressing: [] Met: [] Not Met: [] Adjusted  3. Patient will demonstrate an increase in R shoulder strength to within 5# HHD compared to uninvolved L UE to allow for proper functional mobility as indicated by patients Functional Deficits. [x] Progressing: [] Met: [] Not Met: [] Adjusted  4. Patient will be able to perform all ADLs, self care tasks, and household chores using R UE without increased symptoms or restriction. [x] Progressing: [] Met: [] Not Met: [] Adjusted  5. Patient will be able to perform all work tasks using R UE without increased symptoms or restriction. [x] Progressing: [] Met: [] Not Met: [] Adjusted     Progression Towards Functional goals:  [x] Patient is progressing as expected towards functional goals listed. [] Progression is slowed due to complexities listed. [] Progression has been slowed due to co-morbidities. [] Plan just implemented, too soon to assess goals progression  [] Other:     ASSESSMENT:     R shoulder IR ROM across her low back improving. Encouraged patient to progress IR stretching by pulling up her back at this time. Patient notes increased pain with the task and unwilling to perform despite encouragement that it will be uncomfortable, but needs to be done in order to get back normal mobility. Also initiated wall wash to encourage patient to move into R shoulder abduction. This was very uncomfortable for patient. Continued to encourage her to really push into the range especially because she is feeling an uncomfortable tightness related to tissue restrictions rather than actual pain related to irritation/inflammation. Remaining mobility restrictions and significant weakness still limiting patient's overall ability to return to work safely and successfully. Continued PT necessary to address remaining ROM and strength restrictions before clearance back to work, which requires her to lift up to 25# repetitively. Needs to increase program intensity, however, patient's tardiness and unwillingness to push through some discomfort limit her progress.     Return to Play: (if applicable)     []  Stage 1: Intro to Strength   []  Stage 2: Dynamic Strength and Intro to Plyometrics    []  Stage 3: Advanced Plyometrics and Intro to Throwing     []  Stage 4: Sport specific Training/Return to Sport     []  Ready to Return to Play, [employer]   []  Not Ready for Return to Sports   Comments:      Treatment/Activity Tolerance:  [x] Patient tolerated treatment well [x] Patient limited by fatique  [] Patient limited by pain    [] Patient limited by other medical complications   [] Other:     Overall Progression Towards Functional goals/ Treatment Progress Update:  [x] Patient is progressing as expected towards functional goals listed. [] Progression is slowed due to complexities/Impairments listed. [] Progression has been slowed due to co-morbidities. [] Plan just implemented, too soon to assess goals progression <30days   [] Goals require adjustment due to lack of progress  [] Patient is not progressing as expected and requires additional follow up with physician  [] Other    Prognosis for POC: [x] Good [] Fair  [] Poor    Patient requires continued skilled intervention: [x] Yes  [] No       PLAN:  Progress R shoulder ROM into OH ranges and R shoulder strength and stability as william. Progress note NV. [x] Continue per plan of care [] Alter current plan (see comments)  [] Plan of care initiated [] Hold pending MD visit [] Discharge    Electronically signed by: Tim Lewis, [place], [id number]       Note: If patient does not return for scheduled/recommended follow up visits, this note will serve as a discharge from care along with the most recent update on progress.

## 2020-12-16 ENCOUNTER — APPOINTMENT (OUTPATIENT)
Dept: PHYSICAL THERAPY | Age: 45
End: 2020-12-16
Payer: COMMERCIAL

## 2020-12-18 ENCOUNTER — HOSPITAL ENCOUNTER (OUTPATIENT)
Dept: PHYSICAL THERAPY | Age: 45
Setting detail: THERAPIES SERIES
Discharge: HOME | End: 2020-12-18
Payer: COMMERCIAL

## 2020-12-18 PROCEDURE — 97110 THERAPEUTIC EXERCISES: CPT | Performed by: SPECIALIST/TECHNOLOGIST

## 2020-12-18 PROCEDURE — 97140 MANUAL THERAPY 1/> REGIONS: CPT | Performed by: SPECIALIST/TECHNOLOGIST

## 2020-12-18 PROCEDURE — 97016 VASOPNEUMATIC DEVICE THERAPY: CPT | Performed by: SPECIALIST/TECHNOLOGIST

## 2020-12-18 NOTE — FLOWSHEET NOTE
[facility]        Physical Therapy Re-Certification Plan of Care/MD UPDATE      Dear Alex Germain,    We had the pleasure of treating the following patient for physical therapy services at [address] [place] [address]. A summary of our findings can be found in the updated assessment below. This includes our plan of care. If you have any questions or concerns regarding these findings, please do not hesitate to contact me at the office phone number checked above.   Thank you for the referral.     Physician Signature:________________________________Date:__________________  By signing above (or electronic signature), therapists plan is approved by physician    Date Range Of Visits: 20 to 20  Total Visits to Date: 24  Overall Response to Treatment:   [x]Patient is responding well to treatment and improvement is noted with regards to goals   []Patient should continue to improve in reasonable time if they continue HEP   []Patient has plateaued and is no longer responding to skilled PT intervention    []Patient is getting worse and would benefit from return to referring MD   []Patient unable to adhere to initial POC   []Other:           Physical Therapy Treatment Note/ Progress Report:     Date:  2020    Patient Name:  Justus Colon    :  1975  MRN: [id number]  Medical/Treatment Diagnosis Information:  · Diagnosis: Z47.89 Encounter for orthopedic aftercare; M75.111 Incomplete R RTC tear; M25.511 R shoulder pain  · Treatment Diagnosis: Z47.89 Encounter for orthopedic aftercare; M75.111 Incomplete R RTC tear; M25.511 R shoulder pain  Insurance/Certification information:  PT Insurance Information: [facility] [place] - 61 PT visits /0 used  Physician Information:  Referring Practitioner: Tari Linton signed (Y/N):     Date of Patient follow up with Physician: 21     Progress Report: [x]  Yes  [] No     Functional Scale: 61% disability - Debary Osgood   Date:  20    Date Range for reporting period:  Beginnin20  Ending:  NA    Progress report due (10 Rx/or 30 days whichever is less):      Recertification due (POC duration/ or 90 days whichever is less): 21    Visit # Insurance Allowable Auth Needed    24  [facility] [place] - 60 PT visits/0 used []Yes    [x]No     Pain level:  0/10 at start of session; 5/10 with ROM/stretching exercises     SUBJECTIVE:  Pt reports increased fatigue from doing more on Wednesday with lifting etc. Admits to taking it easy yesterday as a result. Understands that she needs to pace herself more now with her RT arm. OBJECTIVE:    Observation:    10/1/20 Rt bicep with mild ecchymosis present. Pt observed keeping arm guarded against her side even when the sling is off.  10/20/20  Rt shoulder observed  in guarded state in supine position while on wedge   20 RT shoulder not as guarded today with PROM. Flex  ~ 100  ABD ~ 105 ER 45 @90   IR 45  @90   Test measurements:     (20): Left Right    Shoulder Flex 180 165   Shoulder Abd (Scapular plane) 180 175   Shoulder ER 70 @ 90 85 @ 90   Shoulder  @ 90 74  @90                           Strength  MMT dynamometer Left Right   Shoulder Flex 16.6# 16.7#   Shoulder Abd 13.7 9.1   Shoulder ER 15.7 9.4   Shoulder IR 16.0 11.0              RESTRICTIONS/PRECAUTIONS: Situs inversus. Heterotaxy syndrome. Type II diabetes. High BP. Previous R & L knee scopes. [place] barger tear on esophagus.  R shoulder scope, RTC repair & augmentation, SAD, biceps tenodesis 20    Exercises/Interventions:   Therapeutic Exercise  Min:  25 Wt / Resistance Sets/sec Reps Notes   Pulleys scaption/ flexion   3 min    Supine punch  2#   2 10    IR towel stretch behind back/IR Sleeper Stretch  Supine  Hammock      5s  15s  1 min 10  5  3      Supine cane flex/ER  10s 10each    Supine cane press  3# 2 10    Supine SA punch 2# 2 10    Sidelying ER  Sidelying punch/ABD  1#  1#  2 10    No monies RED 2 10       Wall slides flexion  5s 10 HEP   Wall wash OH flexion to abduction  1 10    Biceps/ Triceps 2# 2 10x    Cable column extension   Rows  Lat pulldown 5#  20#  25# 2 10 each TB IR/ER red 2 10 each    Active flexion & scaption HEP1 10 each Mirror feedback          Manual Intervention  Min:  20       R shoulder PROM    20 min    Progress and ROM taken today                                          NMR Re-education  Min:  0       Body Blade ER/IR   Yellow  HOLD  10s 5    Prone Ts   nv                                                  Therapeutic Exercise and NMR EXR  [x] (45050) Provided verbal/tactile cueing for activities related to strengthening, flexibility, endurance, ROM  for improvements in scapular, scapulothoracic and UE control with self care, reaching, carrying, lifting, house/yardwork, driving/computer work. [x] (16005) Provided verbal/tactile cueing for activities related to improving balance, coordination, kinesthetic sense, posture, motor skill, proprioception  to assist with  scapular, scapulothoracic and UE control with self care, reaching, carrying, lifting, house/yardwork, driving/computer work. Therapeutic Activities:    [] (99381 or 70435) Provided verbal/tactile cueing for activities related to improving balance, coordination, kinesthetic sense, posture, motor skill, proprioception and motor activation to allow for proper function of scapular, scapulothoracic and UE control with self care, carrying, lifting, driving/computer work.      Home Exercise Program:    [] (64194) Reviewed/Progressed HEP activities related to strengthening, flexibility, endurance, ROM of scapular, scapulothoracic and UE control with self care, reaching, carrying, lifting, house/yardwork, driving/computer work  [] (55693) Reviewed/Progressed HEP activities related to improving balance, coordination, kinesthetic sense, posture, motor skill, proprioception of scapular, scapulothoracic and UE control with self care, reaching, carrying, lifting, house/yardwork, driving/computer work      Manual Treatments:  PROM / STM / Oscillations-Mobs:  G-I, II, III, IV (Keysha, Inf., Post.)  [x] (89606) Provided manual therapy to mobilize soft tissue/joints of cervical/CT, scapular GHJ and UE for the purpose of modulating pain, promoting relaxation,  increasing ROM, reducing/eliminating soft tissue swelling/inflammation/restriction, improving soft tissue extensibility and allowing for proper ROM for normal function with self care, reaching, carrying, lifting, house/yardwork, driving/computer work    Modalities:  Game Ready to R shoulder for pain/inflammation/edema - 15 minutes (personal preference). Charges:  Timed Code Treatment Minutes: 45   Total Treatment Minutes: 45       [] EVAL (LOW) 28939 (typically 20 minutes face-to-face)  [] EVAL (MOD) 23010 (typically 30 minutes face-to-face)  [] EVAL (HIGH) 03212 (typically 45 minutes face-to-face)  [] RE-EVAL     [x] RANDEE(09806) x 2                     [] IONTO (84725)  [] NMR (82697) x     [] VASO (07081)  [x] Manual (77276) x 1    [] Other:  [] TA (94710)x     [] Mech Traction (37614)  [] ES(attended) (64752)     [] ES (un) (19719): If [facility] Please Indicate Time In/Out  CPT Code Time in Time out                                     GOALS:  Patient stated goal: Regain full use of R arm. [x] Progressing: [] Met: [] Not Met: [] Adjusted    Therapist goals for Patient:   Short Term Goals: To be achieved in: 2 weeks  1. Independent in HEP and progression per patient tolerance, in order to prevent re-injury. [x] Progressing: [] Met: [] Not Met: [] Adjusted  2. Patient will have a decrease in pain to facilitate improvement in movement, function, and ADLs as indicated by Functional Deficits. [x] Progressing: [] Met: [] Not Met: [] Adjusted    Long Term Goals: To be achieved in: 4 weeks  1.  Disability index score of 20% or less for the DASH to assist with reaching prior level of function. [x] Progressing: [] Met: [] Not Met: [] Adjusted  Comment:  61% disability on Quick Dash as of 12/18/20  2. Patient will demonstrate increased R shoulder AROM to allow for proper joint functioning as indicated by patients Functional Deficits. [x] Progressing: [] Met: [] Not Met: [] Adjusted  Comment:  See objective measures above. 3. Patient will demonstrate an increase in R shoulder strength to within 5# HHD compared to uninvolved L UE to allow for proper functional mobility as indicated by patients Functional Deficits. [x] Progressing: [] Met: [] Not Met: [] Adjusted  Comment:  See objective measures above. 4. Patient will be able to perform all ADLs, self care tasks, and household chores using R UE without increased symptoms or restriction. [x] Progressing: [] Met: [] Not Met: [] Adjusted  Comment:  Independent with most ADLs and self care tasks except for tasks that require reaching behind back and OH where patient is still restricted in both ROM and strength. 5. Patient will be able to perform all work tasks using R UE without increased symptoms or restriction. [x] Progressing: [] Met: [] Not Met: [] Adjusted Comment:  Patient not cleared to go back to work without restrictions due to continued strength deficits. Progression Towards Functional goals:  [x] Patient is progressing as expected towards functional goals listed. [] Progression is slowed due to complexities listed. [] Progression has been slowed due to co-morbidities. [] Plan just implemented, too soon to assess goals progression  [] Other:     ASSESSMENT:     Patient has been seen for 24 PT visits and she is approximately 12 weeks s/p R shoulder scope, RTC repair and augmentation, SAD, and biceps tenodesis on 9/17/20. R shoulder ROM is improving, but patient is still restricted at end ranges particularly OH. R shoulder IR ROM across her low back improving. skilled intervention: [x] Yes  [] No       PLAN:  Progress R shoulder ROM into OH ranges and R shoulder strength and stability as william. [x] Continue per plan of care [] Alter current plan (see comments)  [] Plan of care initiated [] Hold pending MD visit [] Discharge    Electronically signed by: Juan Archer, PTA, [id number]       Note: If patient does not return for scheduled/recommended follow up visits, this note will serve as a discharge from care along with the most recent update on progress.

## 2020-12-21 ENCOUNTER — HOSPITAL ENCOUNTER (OUTPATIENT)
Dept: PHYSICAL THERAPY | Age: 45
Setting detail: THERAPIES SERIES
Discharge: HOME | End: 2020-12-21
Payer: COMMERCIAL

## 2020-12-21 PROCEDURE — 97110 THERAPEUTIC EXERCISES: CPT

## 2020-12-21 PROCEDURE — 97140 MANUAL THERAPY 1/> REGIONS: CPT

## 2020-12-21 NOTE — FLOWSHEET NOTE
[facility]    Physical Therapy Treatment Note/ Progress Report:     Date:  2020    Patient Name:  Amari Ryan    :  1975  MRN: [id number]  Medical/Treatment Diagnosis Information:  · Diagnosis: Z47.89 Encounter for orthopedic aftercare; M75.111 Incomplete R RTC tear; M25.511 R shoulder pain  · Treatment Diagnosis: Z47.89 Encounter for orthopedic aftercare; M75.111 Incomplete R RTC tear; M25.511 R shoulder pain  Insurance/Certification information:  PT Insurance Information: [facility] [place] - 61 PT visits /0 used  Physician Information:  Referring Practitioner: Kayli Fragosor of care signed (Y/N):     Date of Patient follow up with Physician: 21     Progress Report: []  Yes  [x]  No     Functional Scale: 61% disability - Blossom Adler   Date:  20    Date Range for reporting period:  Beginnin20  Ending:  NA    Progress report due (10 Rx/or 30 days whichever is less): 0/15/95     Recertification due (POC duration/ or 90 days whichever is less): 21    Visit # Insurance Allowable Auth Needed    25 [facility] [place] - 60 PT visits/0 used []Yes    [x]No     Pain level:  0/10 at start of session; 5/10 with ROM/stretching exercises      SUBJECTIVE:  Pt reports increased fatigue from doing more on Wednesday with lifting etc. Admits to taking it easy yesterday as a result. Understands that she needs to pace herself more now with her RT arm. OBJECTIVE:    Observation:    10/1/20 Rt bicep with mild ecchymosis present. Pt observed keeping arm guarded against her side even when the sling is off.  10/20/20  Rt shoulder observed  in guarded state in supine position while on wedge   20 RT shoulder not as guarded today with PROM. Flex  ~ 100  ABD ~ 105 ER 45 @90   IR 45  @90   Test measurements:     (20):    Left Right    Shoulder Flex 180 165   Shoulder Abd (Scapular plane) 180 175   Shoulder ER 70 @ 90 85 @ 90   Shoulder  @ 90 74  @90                           Strength  MMT dynamometer Left Right   Shoulder Flex 16.6# 16.7#   Shoulder Abd 13.7 9.1   Shoulder ER 15.7 9.4   Shoulder IR 16.0 11.0       RESTRICTIONS/PRECAUTIONS: Situs inversus. Heterotaxy syndrome. Type II diabetes. High BP. Previous R & L knee scopes. [place] barger tear on esophagus. R shoulder scope, RTC repair & augmentation, SAD, biceps tenodesis 9/17/20    Exercises/Interventions:   Therapeutic Exercise  Min:  27 Wt / Resistance Sets/sec Reps Notes   Pulleys scaption   3 min    Supine punch  2#   2 10    IR towel stretch behind back/IR Sleeper Stretch  Supine  Hammock      3#    5s  15s   10  5  3 min      Supine cane flexion 3# 10s 10each    Supine cane press  3# 2 10    Supine SA punch 2# 2 10    Sidelying ER  Sidelying punch/ABD  2#  2#  2 10    No monies RED 2 10       Wall slides flexion  5s 10 HEP   Wall wash OH flexion to abduction  1 10    Biceps/ Triceps 2# 2 10    Cable column extension   Rows  Lat pulldown 10#  20#  25# 2 10 each TB IR/ER red 2 10 each    Active flexion & scaption HEP1 10 each Mirror feedback          Manual Intervention  Min:  25       R shoulder PROM    15 min    Cervical lateral glides & upglides   5 min Grade 3-4   UT/LS PIRS   5 min                                NMR Re-education  Min:  3       Body Blade ER/IR   Yellow  HOLD  10s 5    Prone Ts - stool support  5s 10 Manual assist; Cues for scap mechanics, RTC act   Wall ball rolls cw/ccw                                              Therapeutic Exercise and NMR EXR  [x] (72427) Provided verbal/tactile cueing for activities related to strengthening, flexibility, endurance, ROM  for improvements in scapular, scapulothoracic and UE control with self care, reaching, carrying, lifting, house/yardwork, driving/computer work.     [x] (20619) Provided verbal/tactile cueing for activities related to improving balance, coordination, kinesthetic sense, posture, motor skill, proprioception  to assist with  scapular, scapulothoracic and UE control with self care, reaching, carrying, lifting, house/yardwork, driving/computer work. Therapeutic Activities:    [] (44913 or 77101) Provided verbal/tactile cueing for activities related to improving balance, coordination, kinesthetic sense, posture, motor skill, proprioception and motor activation to allow for proper function of scapular, scapulothoracic and UE control with self care, carrying, lifting, driving/computer work. Home Exercise Program:    [] (01207) Reviewed/Progressed HEP activities related to strengthening, flexibility, endurance, ROM of scapular, scapulothoracic and UE control with self care, reaching, carrying, lifting, house/yardwork, driving/computer work  [] (60963) Reviewed/Progressed HEP activities related to improving balance, coordination, kinesthetic sense, posture, motor skill, proprioception of scapular, scapulothoracic and UE control with self care, reaching, carrying, lifting, house/yardwork, driving/computer work      Manual Treatments:  PROM / STM / Oscillations-Mobs:  G-I, II, III, IV (PA's, Inf., Post.)  [x] (45091) Provided manual therapy to mobilize soft tissue/joints of cervical/CT, scapular GHJ and UE for the purpose of modulating pain, promoting relaxation,  increasing ROM, reducing/eliminating soft tissue swelling/inflammation/restriction, improving soft tissue extensibility and allowing for proper ROM for normal function with self care, reaching, carrying, lifting, house/yardwork, driving/computer work    Modalities:  Game Ready to R shoulder for pain/inflammation/edema - 15 minutes (personal preference).    Charges:  Timed Code Treatment Minutes: 55   Total Treatment Minutes: 55       [] EVAL (LOW) 94681 (typically 20 minutes face-to-face)  [] EVAL (MOD) 52099 (typically 30 minutes face-to-face)  [] EVAL (HIGH) 71888 (typically 45 minutes face-to-face)  [] RE-EVAL     [x] WR(68084) x 2 strength. 5. Patient will be able to perform all work tasks using R UE without increased symptoms or restriction. [x] Progressing: [] Met: [] Not Met: [] Adjusted Comment:  Patient not cleared to go back to work without restrictions due to continued strength deficits. Progression Towards Functional goals:  [x] Patient is progressing as expected towards functional goals listed. [] Progression is slowed due to complexities listed. [] Progression has been slowed due to co-morbidities. [] Plan just implemented, too soon to assess goals progression  [] Other:     ASSESSMENT:     Patient reports uncomfortable pulling in R-sided neck with L cervical rotation. Unsure of what caused it. Addressed R-sided cervical pulling/tightness with cervical mobilization into L rotation and UT/LS PIRS as patient was ttp to R UT/LS. Patient notes improved sxs following. Patient still restricted with end range R shoulder flexion mobility limiting progression of OH strengthening at this time. Program also limited today due to chronic tardiness and poor exercise tolerance. Initiated prone T's today with cueing for proper scapular mechanics and RTC activation, but patient had significant difficulty and fatigued quickly. Return to Play: (if applicable)     []  Stage 1: Intro to Strength   []  Stage 2: Dynamic Strength and Intro to Plyometrics    []  Stage 3: Advanced Plyometrics and Intro to Throwing     []  Stage 4: Sport specific Training/Return to Sport     []  Ready to Return to Play, [employer]   []  Not Ready for Return to Sports   Comments:      Treatment/Activity Tolerance:  [x] Patient tolerated treatment well [x] Patient limited by fatique  [] Patient limited by pain    [] Patient limited by other medical complications   [] Other:     Overall Progression Towards Functional goals/ Treatment Progress Update:  [x] Patient is progressing as expected towards functional goals listed.     [] Progression is slowed

## 2020-12-23 ENCOUNTER — HOSPITAL ENCOUNTER (OUTPATIENT)
Dept: PHYSICAL THERAPY | Age: 45
Setting detail: THERAPIES SERIES
Discharge: HOME | End: 2020-12-23
Payer: COMMERCIAL

## 2020-12-23 NOTE — FLOWSHEET NOTE
[facility]    Physical Therapy  Cancellation/No-show Note  Patient Name:  Oj Saunders  :  1975   Date:  2020  Cancelled visits to date: 0  No-shows to date: 0    For today's appointment patient:  []  Cancelled  []  Rescheduled appointment  [x]  No-show     Reason given by patient:  []  Patient ill  []  Conflicting appointment  []  No transportation    []  Conflict with work  [x]  No reason given  []  Other:     Comments:      Phone call information:   []  Phone call made today to patient at _ time at number provided:      []  Patient answered, conversation as follows:    []  Patient did not answer, message left as follows:  [x]  Phone call not made today  []  Phone call not needed - pt contacted us to cancel and provided reason for cancellation.      Electronically signed by:  Adriana Reagan PT

## 2021-01-04 ENCOUNTER — HOSPITAL ENCOUNTER (OUTPATIENT)
Dept: PHYSICAL THERAPY | Age: 46
Setting detail: THERAPIES SERIES
Discharge: HOME | End: 2021-01-04
Payer: COMMERCIAL

## 2021-01-04 PROCEDURE — 97140 MANUAL THERAPY 1/> REGIONS: CPT

## 2021-01-04 PROCEDURE — 97110 THERAPEUTIC EXERCISES: CPT

## 2021-01-04 NOTE — FLOWSHEET NOTE
[facility]    Physical Therapy Treatment Note/ Progress Report:     Date:  2021    Patient Name:  Kimani Canales    :  1975  MRN: [id number]  Medical/Treatment Diagnosis Information:  · Diagnosis: Z47.89 Encounter for orthopedic aftercare; M75.111 Incomplete R RTC tear; M25.511 R shoulder pain  · Treatment Diagnosis: Z47.89 Encounter for orthopedic aftercare; M75.111 Incomplete R RTC tear; M25.511 R shoulder pain  Insurance/Certification information:  PT Insurance Information: [facility] [place] - 61 PT visits /0 used  Physician Information:  Referring Practitioner: Adonay Acuña of [facility] signed (Y/N):     Date of Patient follow up with Physician: 21     Progress Report: []  Yes  [x]  No     Functional Scale: 61% disability - Erroll Ek   Date:  20    Date Range for reporting period:  Beginnin20  Ending:  NA    Progress report due (10 Rx/or 30 days whichever is less): 57     Recertification due (POC duration/ or 90 days whichever is less): 21    Visit # Insurance Allowable Auth Needed    26 [facility] [place] - 60 PT visits/0 used []Yes    [x]No     Pain level:  0/10     SUBJECTIVE:   Patient arrived to PT session 10 minutes late. Patient sees Dr. Keny Waddell on . Patient really hoping she gets cleared to go back to work upon follow up with Dr. Keny Waddell. Patient was sick last week and unable to come to PT, but patient states that she was very diligent about doing her stretches and exercises at home while she was sick. OBJECTIVE:    Observation:    10/1/20 Rt bicep with mild ecchymosis present. Pt observed keeping arm guarded against her side even when the sling is off.  10/20/20  Rt shoulder observed  in guarded state in supine position while on wedge   20 RT shoulder not as guarded today with PROM.  Flex  ~ 100  ABD ~ 105 ER 45 @90    IR 45  @90   Test measurements: (12/18/20): Left Right    Shoulder Flex 180 165   Shoulder Abd (Scapular plane) 180 175   Shoulder ER 70 @ 90 85 @ 90   Shoulder  @ 90 74  @90                           Strength  MMT dynamometer Left Right   Shoulder Flex 16.6# 16.7#   Shoulder Abd 13.7 9.1   Shoulder ER 15.7 9.4   Shoulder IR 16.0 11.0       RESTRICTIONS/PRECAUTIONS: Situs inversus. Heterotaxy syndrome. Type II diabetes. High BP. Previous R & L knee scopes. [place] barger tear on esophagus.  R shoulder scope, RTC repair & augmentation, SAD, biceps tenodesis 9/17/20    Exercises/Interventions:   Therapeutic Exercise  Min:  15 Wt / Resistance Sets/sec Reps Notes   Pulleys scaption   3 min    IR towel stretch behind back/IR Sleeper Stretch  Supine  Hammock      3#    5s  15s   10  5  3 min      Supine cane flexion 3# 10s 10each    Supine cane press  3# 2 10    Supine SA punch 2# 2 10    Sidelying ER  Sidelying punch/ABD  2#  1#  2 10    Supine SA punch to OH flexion 2# 2 10    No monies RED 2 10       Wall slides flexion  5s 10 HEP   Wall wash OH flexion to abduction  1 10    Biceps/ Triceps 2# 2 10    Cable column extension   Rows  Lat pulldown 10#  20#  25# 2 10 each TB IR/ER red 2 10 each    Active flexion & scaption HEP1 10 each Mirror feedback          Manual Intervention  Min:  15       R shoulder PROM    15 min    Cervical lateral glides & upglides   np Grade 3-4   UT/LS PIRS   np                                NMR Re-education  Min:  5       Body Blade ER/IR   Yellow  HOLD  10s 5    Prone Ts - stool support  5s 10 Manual assist; Cues for scap mechanics, RTC act   Wall ball rolls cw/ccw Pink ball (light) 1 20 each direction    Wall shoulder taps  1 20                                    Therapeutic Exercise and NMR EXR  [x] (09946) Provided verbal/tactile cueing for activities related to strengthening, flexibility, endurance, ROM  for improvements in scapular, scapulothoracic and UE control with self care, reaching, carrying, lifting, minutes face-to-face)  [] EVAL (MOD) 62200 (typically 30 minutes face-to-face)  [] EVAL (HIGH) 72304 (typically 45 minutes face-to-face)  [] RE-EVAL     [x] IA(68768) x 1                     [] IONTO (05949)  [] NMR (67453) x     [] VASO (54524)  [x] Manual (47613) x 1    [] Other:  [] TA (67996)x     [] Mech Traction (87764)  [] ES(attended) (63353)     [] ES (un) (49350): If BWC Please Indicate Time In/Out  CPT Code Time in Time out                                     GOALS:  Patient stated goal: Regain full use of R arm. [x] Progressing: [] Met: [] Not Met: [] Adjusted    Therapist goals for Patient:   Short Term Goals: To be achieved in: 2 weeks  1. Independent in HEP and progression per patient tolerance, in order to prevent re-injury. [x] Progressing: [] Met: [] Not Met: [] Adjusted  2. Patient will have a decrease in pain to facilitate improvement in movement, function, and ADLs as indicated by Functional Deficits. [x] Progressing: [] Met: [] Not Met: [] Adjusted    Long Term Goals: To be achieved in: 4 weeks  1. Disability index score of 20% or less for the DASH to assist with reaching prior level of function. [x] Progressing: [] Met: [] Not Met: [] Adjusted  Comment:  61% disability on Quick Dash as of 12/18/20  2. Patient will demonstrate increased R shoulder AROM to allow for proper joint functioning as indicated by patients Functional Deficits. [x] Progressing: [] Met: [] Not Met: [] Adjusted  Comment:  See objective measures above. 3. Patient will demonstrate an increase in R shoulder strength to within 5# HHD compared to uninvolved L UE to allow for proper functional mobility as indicated by patients Functional Deficits. [x] Progressing: [] Met: [] Not Met: [] Adjusted  Comment:  See objective measures above. 4. Patient will be able to perform all ADLs, self care tasks, and household chores using R UE without increased symptoms or restriction.    [x] Progressing: [] Met: [] Not Met: [] Adjusted  Comment:  Independent with most ADLs and self care tasks except for tasks that require reaching behind back and OH where patient is still restricted in both ROM and strength. 5. Patient will be able to perform all work tasks using R UE without increased symptoms or restriction. [x] Progressing: [] Met: [] Not Met: [] Adjusted Comment:  Patient not cleared to go back to work without restrictions due to continued strength deficits. Progression Towards Functional goals:  [x] Patient is progressing as expected towards functional goals listed. [] Progression is slowed due to complexities listed. [] Progression has been slowed due to co-morbidities. [] Plan just implemented, too soon to assess goals progression  [] Other:     ASSESSMENT:     Patient missed PT last week because she was sick, however, her R shoulder ROM and mobility were much better today than it has been in the past.  Initiated wall shoulder taps to improve tolerance to R UE weight bearing, which is a functional complaint of patient's. Patient reports that she is unable to tolerate pushing up out of bed with her R UE due to continued discomfort. Continued focus on progression of R shoulder strengthening tasks. Patient still fatiguing very quickly. Return to Play: (if applicable)     []  Stage 1: Intro to Strength   []  Stage 2: Dynamic Strength and Intro to Plyometrics    []  Stage 3: Advanced Plyometrics and Intro to Throwing     []  Stage 4: Sport specific Training/Return to Sport     []  Ready to Return to Play, [employer]   []  Not Ready for Return to Sports   Comments:      Treatment/Activity Tolerance:  [x] Patient tolerated treatment well [x] Patient limited by fatique  [] Patient limited by pain    [] Patient limited by other medical complications   [] Other:     Overall Progression Towards Functional goals/ Treatment Progress Update:  [x] Patient is progressing as expected towards functional goals listed.     [] Progression is slowed due to complexities/Impairments listed. [] Progression has been slowed due to co-morbidities. [] Plan just implemented, too soon to assess goals progression <30days   [] Goals require adjustment due to lack of progress  [] Patient is not progressing as expected and requires additional follow up with physician  [] Other    Prognosis for POC: [x] Good [] Fair  [] Poor    Patient requires continued skilled intervention: [x] Yes  [] No       PLAN:  Progress R shoulder ROM into OH ranges and R shoulder strength and stability as william. [x] Continue per plan of care [] Alter current plan (see comments)  [] Plan of care initiated [] Hold pending MD visit [] Discharge    Electronically signed by: [place], [id number]       Note: If patient does not return for scheduled/recommended follow up visits, this note will serve as a discharge from care along with the most recent update on progress.

## 2021-01-06 ENCOUNTER — HOSPITAL ENCOUNTER (OUTPATIENT)
Dept: PHYSICAL THERAPY | Age: 46
Setting detail: THERAPIES SERIES
Discharge: HOME | End: 2021-01-06
Payer: COMMERCIAL

## 2021-01-06 PROCEDURE — 97140 MANUAL THERAPY 1/> REGIONS: CPT | Performed by: SPECIALIST/TECHNOLOGIST

## 2021-01-06 PROCEDURE — 97110 THERAPEUTIC EXERCISES: CPT | Performed by: SPECIALIST/TECHNOLOGIST

## 2021-01-06 PROCEDURE — 97112 NEUROMUSCULAR REEDUCATION: CPT | Performed by: SPECIALIST/TECHNOLOGIST

## 2021-01-06 NOTE — FLOWSHEET NOTE
[facility]    Physical Therapy Treatment Note/ Progress Report:     Date:  2021    Patient Name:  Chris Buchanan    :  1975  MRN: [id number]  Medical/Treatment Diagnosis Information:  · Diagnosis: Z47.89 Encounter for orthopedic aftercare; M75.111 Incomplete R RTC tear; M25.511 R shoulder pain  · Treatment Diagnosis: Z47.89 Encounter for orthopedic aftercare; M75.111 Incomplete R RTC tear; M25.511 R shoulder pain  Insurance/Certification information:  PT Insurance Information: [facility] [place] - 61 PT visits /0 used  Physician Information:  Referring Practitioner: Chikis Lowe of care signed (Y/N):     Date of Patient follow up with Physician: 21     Progress Report: []  Yes  [x]  No     Functional Scale: 61% disability - Myrtle Otero   Date:  20    Date Range for reporting period:  Beginnin20  Ending:  NA    Progress report due (10 Rx/or 30 days whichever is less):      Recertification due (POC duration/ or 90 days whichever is less): 21    Visit # Insurance Allowable Auth Needed    2   [facility] [place] - 60 PT visits/0 used []Yes    [x]No     Pain level:  0/10     SUBJECTIVE:   Patient arrived to PT session 10 minutes late. Patient sees Dr. Lorenso Dakins on . Pt reports    OBJECTIVE:    Observation:    10/1/20 Rt bicep with mild ecchymosis present. Pt observed keeping arm guarded against her side even when the sling is off.  10/20/20  Rt shoulder observed  in guarded state in supine position while on wedge   20 RT shoulder not as guarded today with PROM. Flex  ~ 100  ABD ~ 105 ER 45 @90    IR 45  @90   Test measurements:     (20):    Left Right    Shoulder Flex 180 165   Shoulder Abd (Scapular plane) 180 175   Shoulder ER 70 @ 90 85 @ 90   Shoulder  @ 90 74  @90                           Strength  MMT dynamometer Left Right   Shoulder Flex 16.6# 16.7#   Shoulder Abd 13.7 9.1   Shoulder ER 15.7 9.4   Shoulder IR 16.0 11.0       RESTRICTIONS/PRECAUTIONS: Situs inversus. Heterotaxy syndrome. Type II diabetes. High BP. Previous R & L knee scopes. [place] barger tear on esophagus. R shoulder scope, RTC repair & augmentation, SAD, biceps tenodesis 9/17/20    Exercises/Interventions:   Therapeutic Exercise  Min:  15' Wt / Resistance Sets/sec Reps Notes   Pulleys scaption   3 min          Supine cane press  3# 2 10    Supine SA punch 3# 2 10    Sidelying ER  Sidelying punch/ABD  3#  1#  2 10    Supine SA punch to OH flexion 2# 2 10    No monies RED 2 10       Wall slides flexion  5s 10 HEP   Wall wash OH flexion to abduction  1 10    Biceps/ Triceps  10# 2 10    Cable column Extension   Rows  Lat pulldown 10#  20#  25# 2 10 each    Active flexion & scaption HEP1 10 each Mirror feedback          Manual Intervention  Min:  15       R shoulder PROM    15 min    Cervical lateral glides & upglides   np Grade 3-4   UT/LS PIRS   np                                NMR Re-education  Min:  5       Body Blade ER/IR   Yellow  HOLD  10s 5    Prone Ts - stool support  5s 10 Manual assist; Cues for scap mechanics, RTC act   Wall ball rolls cw/ccw Pink ball (light) 1 20 each direction    Wall shoulder taps  1 20     Box carry  17#20# 2 lap   from table height  carry                            Therapeutic Exercise and NMR EXR  [x] (85351) Provided verbal/tactile cueing for activities related to strengthening, flexibility, endurance, ROM  for improvements in scapular, scapulothoracic and UE control with self care, reaching, carrying, lifting, house/yardwork, driving/computer work. [x] (96209) Provided verbal/tactile cueing for activities related to improving balance, coordination, kinesthetic sense, posture, motor skill, proprioception  to assist with  scapular, scapulothoracic and UE control with self care, reaching, carrying, lifting, house/yardwork, driving/computer work.     Therapeutic Activities:    [] (40543 or 57319) Provided verbal/tactile cueing for activities related to improving balance, coordination, kinesthetic sense, posture, motor skill, proprioception and motor activation to allow for proper function of scapular, scapulothoracic and UE control with self care, carrying, lifting, driving/computer work. Home Exercise Program:    [] (71663) Reviewed/Progressed HEP activities related to strengthening, flexibility, endurance, ROM of scapular, scapulothoracic and UE control with self care, reaching, carrying, lifting, house/yardwork, driving/computer work  [] (59284) Reviewed/Progressed HEP activities related to improving balance, coordination, kinesthetic sense, posture, motor skill, proprioception of scapular, scapulothoracic and UE control with self care, reaching, carrying, lifting, house/yardwork, driving/computer work      Manual Treatments:  PROM / STM / Oscillations-Mobs:  G-I, II, III, IV (PA's, Inf., Post.)  [x] (76754) Provided manual therapy to mobilize soft tissue/joints of cervical/CT, scapular GHJ and UE for the purpose of modulating pain, promoting relaxation,  increasing ROM, reducing/eliminating soft tissue swelling/inflammation/restriction, improving soft tissue extensibility and allowing for proper ROM for normal function with self care, reaching, carrying, lifting, house/yardwork, driving/computer work    Modalities:  Game Ready to R shoulder for pain/inflammation/edema - 15 minutes (personal preference).    Charges:  Timed Code Treatment Minutes: 40   Total Treatment Minutes: 40       [] EVAL (LOW) 81820 (typically 20 minutes face-to-face)  [] EVAL (MOD) 73175 (typically 30 minutes face-to-face)  [] EVAL (HIGH) 89061 (typically 45 minutes face-to-face)  [] RE-EVAL     [x] MX(41130) x 1                     [] IONTO (54341)  [x] NMR (50474) x  1   [] VASO (49347)  [x] Manual (80738) x 1    [] Other:  [] TA (71446)x     [] Mech Traction (49120)  [] ES(attended) (77085) [] ES (un) (09724): If BWC Please Indicate Time In/Out  CPT Code Time in Time out                                     GOALS:  Patient stated goal: Regain full use of R arm. [x] Progressing: [] Met: [] Not Met: [] Adjusted    Therapist goals for Patient:   Short Term Goals: To be achieved in: 2 weeks  1. Independent in HEP and progression per patient tolerance, in order to prevent re-injury. [x] Progressing: [] Met: [] Not Met: [] Adjusted  2. Patient will have a decrease in pain to facilitate improvement in movement, function, and ADLs as indicated by Functional Deficits. [x] Progressing: [] Met: [] Not Met: [] Adjusted    Long Term Goals: To be achieved in: 4 weeks  1. Disability index score of 20% or less for the DASH to assist with reaching prior level of function. [x] Progressing: [] Met: [] Not Met: [] Adjusted  Comment:  61% disability on Quick Dash as of 12/18/20  2. Patient will demonstrate increased R shoulder AROM to allow for proper joint functioning as indicated by patients Functional Deficits. [x] Progressing: [] Met: [] Not Met: [] Adjusted  Comment:  See objective measures above. 3. Patient will demonstrate an increase in R shoulder strength to within 5# HHD compared to uninvolved L UE to allow for proper functional mobility as indicated by patients Functional Deficits. [x] Progressing: [] Met: [] Not Met: [] Adjusted  Comment:  See objective measures above. 4. Patient will be able to perform all ADLs, self care tasks, and household chores using R UE without increased symptoms or restriction. [x] Progressing: [] Met: [] Not Met: [] Adjusted  Comment:  Independent with most ADLs and self care tasks except for tasks that require reaching behind back and OH where patient is still restricted in both ROM and strength. 5. Patient will be able to perform all work tasks using R UE without increased symptoms or restriction.   [x] Progressing: [] Met: [] Not Met: [] Adjusted Comment: Patient not cleared to go back to work without restrictions due to continued strength deficits. Progression Towards Functional goals:  [x] Patient is progressing as expected towards functional goals listed. [] Progression is slowed due to complexities listed. [] Progression has been slowed due to co-morbidities. [] Plan just implemented, too soon to assess goals progression  [] Other:     ASSESSMENT:     Rt shoulder ROM and mobility were much better today than it has been in the past.  Initiated  Functional work demands like 17- 20# box carry from waist height table around clinic. Patient reports that she is unable to tolerate pushing up out of bed with her R UE due to continued discomfort or laying on RT shoulder. Continued focus on progression of R shoulder strengthening tasks. Patient still fatiguing very quickly. Return to Play: (if applicable)     []  Stage 1: Intro to Strength   []  Stage 2: Dynamic Strength and Intro to Plyometrics    []  Stage 3: Advanced Plyometrics and Intro to Throwing     []  Stage 4: Sport specific Training/Return to Sport     []  Ready to Return to Play, [employer]   []  Not Ready for Return to Sports   Comments:      Treatment/Activity Tolerance:  [x] Patient tolerated treatment well [x] Patient limited by fatique  [] Patient limited by pain    [] Patient limited by other medical complications   [] Other:     Overall Progression Towards Functional goals/ Treatment Progress Update:  [x] Patient is progressing as expected towards functional goals listed. [] Progression is slowed due to complexities/Impairments listed. [] Progression has been slowed due to co-morbidities.   [] Plan just implemented, too soon to assess goals progression <30days   [] Goals require adjustment due to lack of progress  [] Patient is not progressing as expected and requires additional follow up with physician  [] Other    Prognosis for POC: [x] Good [] Fair  [] Poor    Patient requires continued skilled intervention: [x] Yes  [] No       PLAN:  Progress R shoulder ROM into OH ranges and R shoulder strength and stability as william. [x] Continue per plan of care [] Alter current plan (see comments)  [] Plan of care initiated [] Hold pending MD visit [] Discharge    Electronically signed by: German Ziegler PTA, [id number]       Note: If patient does not return for scheduled/recommended follow up visits, this note will serve as a discharge from care along with the most recent update on progress.

## 2021-01-08 ENCOUNTER — APPOINTMENT (OUTPATIENT)
Dept: PHYSICAL THERAPY | Age: 46
End: 2021-01-08
Payer: COMMERCIAL

## 2021-01-11 ENCOUNTER — APPOINTMENT (OUTPATIENT)
Dept: PHYSICAL THERAPY | Age: 46
End: 2021-01-11
Payer: COMMERCIAL

## 2021-01-13 ENCOUNTER — HOSPITAL ENCOUNTER (OUTPATIENT)
Dept: PHYSICAL THERAPY | Age: 46
Setting detail: THERAPIES SERIES
Discharge: HOME | End: 2021-01-13
Payer: COMMERCIAL

## 2021-01-13 ENCOUNTER — APPOINTMENT (OUTPATIENT)
Dept: PHYSICAL THERAPY | Age: 46
End: 2021-01-13
Payer: COMMERCIAL

## 2021-01-13 ENCOUNTER — OFFICE VISIT (OUTPATIENT)
Dept: ORTHOPEDIC SURGERY | Age: 46
End: 2021-01-13
Payer: COMMERCIAL

## 2021-01-13 DIAGNOSIS — Z98.890 S/P RIGHT ROTATOR CUFF REPAIR: Primary | ICD-10-CM

## 2021-01-13 PROCEDURE — G8484 FLU IMMUNIZE NO ADMIN: HCPCS | Performed by: ORTHOPAEDIC SURGERY

## 2021-01-13 PROCEDURE — 97112 NEUROMUSCULAR REEDUCATION: CPT

## 2021-01-13 PROCEDURE — 97140 MANUAL THERAPY 1/> REGIONS: CPT

## 2021-01-13 PROCEDURE — 97110 THERAPEUTIC EXERCISES: CPT

## 2021-01-13 PROCEDURE — 1036F TOBACCO NON-USER: CPT | Performed by: ORTHOPAEDIC SURGERY

## 2021-01-13 PROCEDURE — G8428 CUR MEDS NOT DOCUMENT: HCPCS | Performed by: ORTHOPAEDIC SURGERY

## 2021-01-13 PROCEDURE — G8417 CALC BMI ABV UP PARAM F/U: HCPCS | Performed by: ORTHOPAEDIC SURGERY

## 2021-01-13 PROCEDURE — 99213 OFFICE O/P EST LOW 20 MIN: CPT | Performed by: ORTHOPAEDIC SURGERY

## 2021-01-13 NOTE — FLOWSHEET NOTE
[facility]    Physical Therapy Treatment Note/ Progress Report:     Date:  2021    Patient Name:  Remedios Peter    :  1975  MRN: [id number]  Medical/Treatment Diagnosis Information:  · Diagnosis: Z47.89 Encounter for orthopedic aftercare; M75.111 Incomplete R RTC tear; M25.511 R shoulder pain  · Treatment Diagnosis: Z47.89 Encounter for orthopedic aftercare; M75.111 Incomplete R RTC tear; M25.511 R shoulder pain  Insurance/Certification information:  PT Insurance Information: [facility] [place] - 61 PT visits /0 used  Physician Information:  Referring Practitioner: Corry Brooks of [facility] signed (Y/N):     Date of Patient follow up with Physician: 21     Progress Report: []  Yes  [x]  No     Functional Scale: 61% disability - Fransisco Ace   Date:  20    Date Range for reporting period:  Beginnin20  Ending:  NA    Progress report due (10 Rx/or 30 days whichever is less): 26     Recertification due (POC duration/ or 90 days whichever is less): 21    Visit # Insurance Allowable Auth Needed    3   [facility] [place] - 60 PT visits/0 used []Yes    [x]No     Pain level:  0/10     SUBJECTIVE:   Patient arrived to PT session 15 minutes late. Patient sees Dr. Adria Chen immediately following session today. Hoping to get cleared to go back to work. OBJECTIVE:    Observation:    10/1/20 Rt bicep with mild ecchymosis present. Pt observed keeping arm guarded against her side even when the sling is off.  10/20/20  Rt shoulder observed  in guarded state in supine position while on wedge   20 RT shoulder not as guarded today with PROM. Flex  ~ 100  ABD ~ 105 ER 45 @90    IR 45  @90   Test measurements:     (21):   Left Right    Shoulder Flex 180 150/165   Shoulder Abd 180 118/175   Shoulder ER 70 @ 90/90 (passive); Reaches behind head to T4.  85 @ 90/90; 95 in scapular plane (passive);  Reaches behind head to T1. Shoulder  @ 90/90 (passive); Reaches behind back to T4.   74  @90/90; Reaches behind back to T12.                           Strength  MMT dynamometer Left Right   Shoulder Flex 17.1# 16.7# Compensates with GH elevation. Shoulder Abd 20.3# 9.6# Compensates with L trunk SBing. Shoulder ER 27.6# 20.6#   Shoulder IR 17.8# 13.5#       RESTRICTIONS/PRECAUTIONS: Situs inversus. Heterotaxy syndrome. Type II diabetes. High BP. Previous R & L knee scopes. [place] barger tear on esophagus. R shoulder scope, RTC repair & augmentation, SAD, biceps tenodesis 9/17/20    Exercises/Interventions:   Therapeutic Exercise  Min:  15 Wt / Resistance Sets/sec Reps Notes   Pulleys scaption   3 min    IR stretch behind back [id number]      Supine cane press  3# 2 10    Supine SA punch 3# 2 10    Sidelying ER  Sidelying punch/ABD  3#  1#  2 10    Supine SA punch to OH flexion 2# 2 10    No monies RED 2 10       Wall slides flexion  5s 10 HEP   Wall wash OH flexion to abduction  1 10    Biceps/ Triceps  10# 2 10    Cable column Extension   Rows  Lat pulldown 10#  20#  40# 2 10 each    Active flexion & scaption HEP1 10 each Mirror feedback          Tests and measures. 5 min Updated measures for physician follow up.           Manual Intervention  Min:  15       R shoulder PROM    15 min    Cervical lateral glides & upglides   np Grade 3-4   UT/LS PIRS   np                                NMR Re-education  Min:  15       Body Blade ER/IR   Yellow    10s 5    Prone Ts - stool support  5s 10 Manual assist; Cues for scap mechanics, RTC act   Wall ball rolls flex/abd cw/ccw Pink ball (light) 1 20 each direction    Wall shoulder taps High mat 1 20    Cone stacking OH  2x 10 cones     Box carry  17#20# 2 lap   from table height  carry                            Therapeutic Exercise and NMR EXR  [x] (78459) Provided verbal/tactile cueing for activities related to strengthening, flexibility, endurance, ROM  for improvements in scapular, scapulothoracic and UE control with self care, reaching, carrying, lifting, house/yardwork, driving/computer work. [x] (83573) Provided verbal/tactile cueing for activities related to improving balance, coordination, kinesthetic sense, posture, motor skill, proprioception  to assist with  scapular, scapulothoracic and UE control with self care, reaching, carrying, lifting, house/yardwork, driving/computer work. Therapeutic Activities:    [] (35169 or 17020) Provided verbal/tactile cueing for activities related to improving balance, coordination, kinesthetic sense, posture, motor skill, proprioception and motor activation to allow for proper function of scapular, scapulothoracic and UE control with self care, carrying, lifting, driving/computer work. Home Exercise Program:    [] (33239) Reviewed/Progressed HEP activities related to strengthening, flexibility, endurance, ROM of scapular, scapulothoracic and UE control with self care, reaching, carrying, lifting, house/yardwork, driving/computer work  [] (29816) Reviewed/Progressed HEP activities related to improving balance, coordination, kinesthetic sense, posture, motor skill, proprioception of scapular, scapulothoracic and UE control with self care, reaching, carrying, lifting, house/yardwork, driving/computer work      Manual Treatments:  PROM / STM / Oscillations-Mobs:  G-I, II, III, IV (PA's, Inf., Post.)  [x] (82253) Provided manual therapy to mobilize soft tissue/joints of cervical/CT, scapular GHJ and UE for the purpose of modulating pain, promoting relaxation,  increasing ROM, reducing/eliminating soft tissue swelling/inflammation/restriction, improving soft tissue extensibility and allowing for proper ROM for normal function with self care, reaching, carrying, lifting, house/yardwork, driving/computer work    Modalities:  Game Ready to R shoulder for pain/inflammation/edema - 15 minutes (personal preference). Charges:  Timed Code Treatment Minutes: 45   Total Treatment Minutes: 45       [] EVAL (LOW) 93731 (typically 20 minutes face-to-face)  [] EVAL (MOD) 87234 (typically 30 minutes face-to-face)  [] EVAL (HIGH) 29378 (typically 45 minutes face-to-face)  [] RE-EVAL     [x] ZD(54221) x 1                     [] IONTO (95460)  [x] NMR (09258) x  1   [] VASO (39697)  [x] Manual (13867) x 1    [] Other:  [] TA (31247)x     [] Mech Traction (06208)  [] ES(attended) (68295)     [] ES (un) (51085): If BW Please Indicate Time In/Out  CPT Code Time in Time out                                     GOALS:  Patient stated goal: Regain full use of R arm. [x] Progressing: [] Met: [] Not Met: [] Adjusted    Therapist goals for Patient:   Short Term Goals: To be achieved in: 2 weeks  1. Independent in HEP and progression per patient tolerance, in order to prevent re-injury. [x] Progressing: [] Met: [] Not Met: [] Adjusted  2. Patient will have a decrease in pain to facilitate improvement in movement, function, and ADLs as indicated by Functional Deficits. [x] Progressing: [] Met: [] Not Met: [] Adjusted    Long Term Goals: To be achieved in: 4 weeks  1. Disability index score of 20% or less for the DASH to assist with reaching prior level of function. [x] Progressing: [] Met: [] Not Met: [] Adjusted  Comment:  61% disability on Quick Dash as of 12/18/20  2. Patient will demonstrate increased R shoulder AROM to allow for proper joint functioning as indicated by patients Functional Deficits. [x] Progressing: [] Met: [] Not Met: [] Adjusted  Comment:  See objective measures above. 3. Patient will demonstrate an increase in R shoulder strength to within 5# HHD compared to uninvolved L UE to allow for proper functional mobility as indicated by patients Functional Deficits. [x] Progressing: [] Met: [] Not Met: [] Adjusted  Comment:  See objective measures above.   4. Patient will be able to perform all ADLs, self care tasks, and household chores using R UE without increased symptoms or restriction. [x] Progressing: [] Met: [] Not Met: [] Adjusted  Comment:  Independent with most ADLs and self care tasks except for tasks that require reaching behind back and OH where patient is still restricted in both ROM and strength. 5. Patient will be able to perform all work tasks using R UE without increased symptoms or restriction. [x] Progressing: [] Met: [] Not Met: [] Adjusted Comment:  Patient not cleared to go back to work without restrictions due to continued strength deficits. Progression Towards Functional goals:  [x] Patient is progressing as expected towards functional goals listed. [] Progression is slowed due to complexities listed. [] Progression has been slowed due to co-morbidities. [] Plan just implemented, too soon to assess goals progression  [] Other:     ASSESSMENT:     Updated R shoulder ROM and strength measures today. ROM is much improved in all directions. Some restrictions still particularly with end range abduction and IR/ER. Patient reports that she is still unable to tolerate pushing up out of bed with her R UE due to continued discomfort with weight bearing through the R shoulder. Continued focus on progression of R shoulder strengthening tasks today. Patient still very weak specifically in OH ranges. Patient compensating with trunk SBing and GH elevation when performing active R shoulder flexion and abduction above shoulder height. 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     Return to Play: (if applicable)     []  Stage 1: Intro to Strength   []  Stage 2: Dynamic Strength and Intro to Plyometrics    []  Stage 3: Advanced Plyometrics and Intro to Throwing     []  Stage 4: Sport specific Training/Return to Sport     []  Ready to Return to Play, Meets All Above Stages   []  Not Ready for Return to Sports   Comments:      Treatment/Activity Tolerance:  [x] Patient tolerated treatment well [x] Patient limited by fatique  [] Patient limited by pain    [] Patient limited by other medical complications   [] Other:     Overall Progression Towards Functional goals/ Treatment Progress Update:  [x] Patient is progressing as expected towards functional goals listed. [] Progression is slowed due to complexities/Impairments listed. [] Progression has been slowed due to co-morbidities. [] Plan just implemented, too soon to assess goals progression <30days   [] Goals require adjustment due to lack of progress  [] Patient is not progressing as expected and requires additional follow up with physician  [] Other    Prognosis for POC: [x] Good [] Fair  [] Poor    Patient requires continued skilled intervention: [x] Yes  [] No       PLAN:  Progress R shoulder ROM into OH ranges and R shoulder strength and stability as william. [x] Continue per plan of care [] Alter current plan (see comments)  [] Plan of care initiated [] Hold pending MD visit [] Discharge    Electronically signed by: King Mica PT, [id number]       Note: If patient does not return for scheduled/recommended follow up visits, this note will serve as a discharge from care along with the most recent update on progress.

## 2021-01-13 NOTE — LETTER
[address]  [address] [facility] [address]  Phone: [phone]  Fax: [phone]    Jamilah Meade MD        January 13, 2021     Patient: Hossein Castro   YOB: 1975   Date of Visit: 1/13/2021       To Whom it May Concern:    Purvi Gastelum was seen in my clinic on 1/13/2021. She may return back to work effective January 18, 2021 without restrictions. If you have any questions or concerns, please don't hesitate to call.     Sincerely,         Jamilah Meade MD

## 2021-01-13 NOTE — PROGRESS NOTES
Chief Complaint    Shoulder Problem (right shoulder )      History of Present Illness:  Yves Tovar is a 54 y.o. female. Follow-up for the right shoulder. She is 4 months status post right shoulder arthroscopy with arthroscopic rotator cuff repair and biceps tenotomy. She is doing okay at this time. Overall she states her shoulder feels much better than it did 6 weeks ago. She is continuing to do physical therapy. She has made progress in range of motion and strength but does continue to demonstrate some weakness. Medical History:  Patient's medications, allergies, past medical, surgical, social and family histories were reviewed and updated as appropriate. Review of Systems:  Pertinent items are noted in HPI  Review of systems reviewed from Patient History Form dated on 1/13/21 and available in the patient's chart under the Media tab. Vital Signs: There were no vitals taken for this visit. General Exam:   Constitutional: Patient is adequately groomed with no evidence of malnutrition  DTRs: Deep tendon reflexes are intact  Mental Status: The patient is oriented to time, place and person. The patient's mood and affect are appropriate. Shoulder Examination:    Inspection: No significant swelling erythema noted to the right shoulder today    Palpation: No significant tenderness palpation today    Range of Motion: Active forward elevation today is 120 degrees. Passively we can get her to 150 degrees. External rotation 70 degrees    Strength: She does demonstrate 4 out of 5 strength with isolated supraspinatus testing as well as resisted external rotation    Special Tests: Negative drop arm. Negative [place]'s. Negative speeds    Skin: There are no rashes, ulcerations or lesions.     Gait: Normal      Additional Comments:       Additional Examinations: Left Upper Extremity: Examination of the left upper extremity does not show any tenderness, deformity or injury. Range of motion is unremarkable. There is no gross instability. There are no rashes, ulcerations or lesions. Strength and tone are normal.        Assessment : Status post right shoulder arthroscopy with rotator cuff repair    Impression:  Encounter Diagnosis   Name Primary?  S/P right rotator cuff repair Yes       Office Procedures:  No orders of the defined types were placed in this encounter. Treatment Plan: Overall doing okay with the shoulder at this point time. She does wish to return back to work without restrictions we will let her return back to work next week.   I will see her back in 2 to 3 months for follow-up if having any problems with the shoulder, if doing well at that time she may start return back to all normal activities

## 2021-01-15 ENCOUNTER — HOSPITAL ENCOUNTER (OUTPATIENT)
Dept: PHYSICAL THERAPY | Age: 46
Setting detail: THERAPIES SERIES
Discharge: HOME | End: 2021-01-15
Payer: COMMERCIAL

## 2021-01-15 ENCOUNTER — APPOINTMENT (OUTPATIENT)
Dept: PHYSICAL THERAPY | Age: 46
End: 2021-01-15
Payer: COMMERCIAL

## 2021-01-15 NOTE — FLOWSHEET NOTE
[address], [facility]    Physical Therapy  Cancellation/No-show Note  Patient Name:  Brenden Dickey  :  1975   Date:  1/15/2021  Cancelled visits to date: 1  No-shows to date: 0    For today's appointment patient:  [x]  Cancelled  []  Rescheduled appointment  []  No-show     Reason given by patient:  [x]  Patient ill, pt admits to having food poisoning  []  Conflicting appointment  []  No transportation    []  Conflict with work  []  No reason given  []  Other:     Comments:      Phone call information:   []  Phone call made today to patient at _ time at number provided:      []  Patient answered, conversation as follows:    []  Patient did not answer, message left as follows:  []  Phone call not made today  [x]  Phone call not needed - pt contacted us to cancel and provided reason for cancellation.      Electronically signed by:  Bianca Olivier [place], [id number]

## 2021-01-19 ENCOUNTER — HOSPITAL ENCOUNTER (OUTPATIENT)
Dept: PHYSICAL THERAPY | Age: 46
Setting detail: THERAPIES SERIES
Discharge: HOME | End: 2021-01-19
Payer: COMMERCIAL

## 2021-01-19 PROCEDURE — 97110 THERAPEUTIC EXERCISES: CPT | Performed by: SPECIALIST/TECHNOLOGIST

## 2021-01-19 PROCEDURE — 97140 MANUAL THERAPY 1/> REGIONS: CPT | Performed by: SPECIALIST/TECHNOLOGIST

## 2021-01-19 PROCEDURE — 97016 VASOPNEUMATIC DEVICE THERAPY: CPT | Performed by: SPECIALIST/TECHNOLOGIST

## 2021-01-19 NOTE — FLOWSHEET NOTE
[facility]    Physical Therapy Treatment Note/ Progress Report:     Date:  2021    Patient Name:  Tye Saeed    :  1975  MRN: [id number]  Medical/Treatment Diagnosis Information:  · Diagnosis: Z47.89 Encounter for orthopedic aftercare; M75.111 Incomplete R RTC tear; M25.511 R shoulder pain  · Treatment Diagnosis: Z47.89 Encounter for orthopedic aftercare; M75.111 Incomplete R RTC tear; M25.511 R shoulder pain  Insurance/Certification information:  PT Insurance Information: [facility] [place] - 61 PT visits /0 used  Physician Information:  Referring Practitioner: Marry Rosario  renata signed (Y/N):     Date of Patient follow up with Physician: 21     Progress Report: []  Yes  [x]  No     Functional Scale: 61% disability - Jerrica Amezcua   Date:  20    Date Range for reporting period:  Beginnin20  Ending:  NA    Progress report due (10 Rx/or 30 days whichever is less): 3/78/48     Recertification due (POC duration/ or 90 days whichever is less): 21    Visit # Insurance Allowable Auth Needed    4   [employer] [place] - 60 PT visits/0 used []Yes    [x]No     Pain level:  0/10     SUBJECTIVE:    Returned to work today working 10 hour shift and worked with arms in front of her for most of the day but minimal time spent overhead. RT shoulder is doing well and attempting to stretch when she can. Pt cleared to RTW by [employer] with no restrictions. OBJECTIVE:    Observation:    10/1/20 Rt bicep with mild ecchymosis present. Pt observed keeping arm guarded against her side even when the sling is off.  10/20/20  Rt shoulder observed  in guarded state in supine position while on wedge   20 RT shoulder not as guarded today with PROM.  Flex  ~ 100  ABD ~ 105 ER 45 @90    IR 45  @90   Test measurements:     (21):   Left Right    Shoulder Flex 180 150/165   Shoulder Abd 180 118/175   Shoulder ER 70 @ 90/90 (passive); Reaches behind head to T4.  85 @ 90/90; 95 in scapular plane (passive); Reaches behind head to T1. Shoulder  @ 90/90 (passive); Reaches behind back to T4.   74  @90/90; Reaches behind back to T12.                           Strength  MMT dynamometer Left Right   Shoulder Flex 17.1# 16.7# Compensates with GH elevation. Shoulder Abd 20.3# 9.6# Compensates with L trunk SBing. Shoulder ER 27.6# 20.6#   Shoulder IR 17.8# 13.5#   1/19/21 Rt shoulder PROM WNL in all ranges but had increased stiffness until after mobilizations    RESTRICTIONS/PRECAUTIONS: Situs inversus. Heterotaxy syndrome. Type II diabetes. High BP. Previous R & L knee scopes. [place] barger tear on esophagus. R shoulder scope, RTC repair & augmentation, SAD, biceps tenodesis 9/17/20    Exercises/Interventions:   Therapeutic Exercise  Min:  15 Wt / Resistance Sets/sec Reps Notes   Pulleys scaption   3 min    IR stretch behind back [id number]      Supine cane press  3# 2 10    Supine SA punch 3# 2 10    Sidelying ER  Sidelying punch/ABD 4/ 3#  3#  2 10    Supine SA punch to OH flexion 2# 2 10    No monies RED 2 10       Wall slides flexion  5s 10 HEP   Wall wash OH flexion to abduction  1 10    Biceps 5#/ Triceps  10# 2 10    Cable column Extension   Rows  Lat pulldown 10#  40#  40# 2 10 each    Active flexion & scaption HEP1 10 each Mirror feedback                        Manual Intervention  Min:  15       R shoulder PROM    15 min                                NMR Re-education  Min:  15          Manual assist; Cues for scap mechanics, RTC act             from table height  carry                        Therapeutic Exercise and NMR EXR  [x] (29714) Provided verbal/tactile cueing for activities related to strengthening, flexibility, endurance, ROM  for improvements in scapular, scapulothoracic and UE control with self care, reaching, carrying, lifting, house/yardwork, driving/computer work.     [x] (49947) Provided verbal/tactile cueing for activities related to improving balance, coordination, kinesthetic sense, posture, motor skill, proprioception  to assist with  scapular, scapulothoracic and UE control with self care, reaching, carrying, lifting, house/yardwork, driving/computer work. Therapeutic Activities:    [] (62284 or 73817) Provided verbal/tactile cueing for activities related to improving balance, coordination, kinesthetic sense, posture, motor skill, proprioception and motor activation to allow for proper function of scapular, scapulothoracic and UE control with self care, carrying, lifting, driving/computer work. Home Exercise Program:    [] (53533) Reviewed/Progressed HEP activities related to strengthening, flexibility, endurance, ROM of scapular, scapulothoracic and UE control with self care, reaching, carrying, lifting, house/yardwork, driving/computer work  [] (75697) Reviewed/Progressed HEP activities related to improving balance, coordination, kinesthetic sense, posture, motor skill, proprioception of scapular, scapulothoracic and UE control with self care, reaching, carrying, lifting, house/yardwork, driving/computer work      Manual Treatments:  PROM / STM / Oscillations-Mobs:  G-I, II, III, IV (PA's, Inf., Post.)  [x] (91441) Provided manual therapy to mobilize soft tissue/joints of cervical/CT, scapular GHJ and UE for the purpose of modulating pain, promoting relaxation,  increasing ROM, reducing/eliminating soft tissue swelling/inflammation/restriction, improving soft tissue extensibility and allowing for proper ROM for normal function with self care, reaching, carrying, lifting, house/yardwork, driving/computer work    Modalities:  Game Ready to R shoulder for pain/inflammation/edema - 15 minutes (personal preference).    Charges:  Timed Code Treatment Minutes: 45   Total Treatment Minutes: 60       [] EVAL (LOW) 12632 (typically 20 minutes face-to-face)  [] EVAL (MOD) 73080 (typically 30 care tasks except for tasks that require reaching behind back and OH where patient is still restricted in both ROM and strength. 5. Patient will be able to perform all work tasks using R UE without increased symptoms or restriction. [x] Progressing: [] Met: [] Not Met: [] Adjusted Comment:  Patient not cleared to go back to work without restrictions due to continued strength deficits. Progression Towards Functional goals:  [x] Patient is progressing as expected towards functional goals listed. [] Progression is slowed due to complexities listed. [] Progression has been slowed due to co-morbidities. [] Plan just implemented, too soon to assess goals progression  [] Other:     ASSESSMENT:     Pt returns to therapy today after starting back to work and arms arContinued focus on progression of R shoulder strengthening tasks today. Patient still very weak specifically in OH ranges. Patient compensating with trunk SBing and GH elevation when performing active R shoulder flexion and abduction above shoulder height. Return to Play: (if applicable)     []  Stage 1: Intro to Strength   []  Stage 2: Dynamic Strength and Intro to Plyometrics    []  Stage 3: Advanced Plyometrics and Intro to Throwing     []  Stage 4: Sport specific Training/Return to Sport     []  Ready to Return to Play, [employer]   []  Not Ready for Return to Sports   Comments:      Treatment/Activity Tolerance:  [x] Patient tolerated treatment well [x] Patient limited by fatique  [] Patient limited by pain    [] Patient limited by other medical complications   [] Other:     Overall Progression Towards Functional goals/ Treatment Progress Update:  [x] Patient is progressing as expected towards functional goals listed. [] Progression is slowed due to complexities/Impairments listed. [] Progression has been slowed due to co-morbidities.   [] Plan just implemented, too soon to assess goals progression <30days   [] Goals require adjustment due to lack of progress  [] Patient is not progressing as expected and requires additional follow up with physician  [] Other    Prognosis for POC: [x] Good [] Fair  [] Poor    Patient requires continued skilled intervention: [x] Yes  [] No       PLAN:  Progress R shoulder ROM into [place] ranges and R shoulder strength and stability as william. [x] Continue per plan of care [] Alter current plan (see comments)  [] Plan of care initiated [] Hold pending MD visit [] Discharge    Electronically signed by: Leno Reis, PTA, [id number]       Note: If patient does not return for scheduled/recommended follow up visits, this note will serve as a discharge from care along with the most recent update on progress.

## 2021-01-21 ENCOUNTER — HOSPITAL ENCOUNTER (OUTPATIENT)
Dept: PHYSICAL THERAPY | Age: 46
Setting detail: THERAPIES SERIES
Discharge: HOME | End: 2021-01-21
Payer: COMMERCIAL

## 2021-01-21 PROCEDURE — 97140 MANUAL THERAPY 1/> REGIONS: CPT | Performed by: SPECIALIST/TECHNOLOGIST

## 2021-01-21 PROCEDURE — 97110 THERAPEUTIC EXERCISES: CPT | Performed by: SPECIALIST/TECHNOLOGIST

## 2021-01-21 NOTE — FLOWSHEET NOTE
[facility]    Physical Therapy Treatment Note/ Progress Report:     Date:  2021    Patient Name:  Alisson Zarate    :  1975  MRN: [id number]  Medical/Treatment Diagnosis Information:  · Diagnosis: Z47.89 Encounter for orthopedic aftercare; M75.111 Incomplete R RTC tear; M25.511 R shoulder pain  · Treatment Diagnosis: Z47.89 Encounter for orthopedic aftercare; M75.111 Incomplete R RTC tear; M25.511 R shoulder pain  Insurance/Certification information:  PT Insurance Information: [employer] [place] - 61 PT visits /0 used  Physician Information:  Referring Practitioner: Sharon Bethea of care signed (Y/N):     Date of Patient follow up with Physician:      Progress Report: []  Yes  [x]  No     Functional Scale: 61% disability - Jesus Miles   Date:  20    Date Range for reporting period:  Beginnin20  Ending:  NA    Progress report due (10 Rx/or 30 days whichever is less):      Recertification due (POC duration/ or 90 days whichever is less): 21    Visit # Insurance Allowable Auth Needed    4   [employer] [place] - 60 PT visits/0 used []Yes    [x]No     Pain level:  0/10     SUBJECTIVE:    Rt shoulder more sore with increased biceps discomfort related to work demands today in multiple dept. Is very fatigued attending pt sessions today. OBJECTIVE:    Observation:    10/1/20 Rt bicep with mild ecchymosis present. Pt observed keeping arm guarded against her side even when the sling is off.  10/20/20  Rt shoulder observed  in guarded state in supine position while on wedge   20 RT shoulder not as guarded today with PROM. Flex  ~ 100  ABD ~ 105 ER 45 @90    IR 45  @90   Test measurements:     (21):   Left Right    Shoulder Flex 180 150/165   Shoulder Abd 180 118/175   Shoulder ER 70 @ 90/90 (passive); Reaches behind head to T4.  85 @ 90/90; 95 in scapular plane (passive);  Reaches behind head to driving/computer work. Therapeutic Activities:    [] (94595 or 41607) Provided verbal/tactile cueing for activities related to improving balance, coordination, kinesthetic sense, posture, motor skill, proprioception and motor activation to allow for proper function of scapular, scapulothoracic and UE control with self care, carrying, lifting, driving/computer work. Home Exercise Program:    [] (15604) Reviewed/Progressed HEP activities related to strengthening, flexibility, endurance, ROM of scapular, scapulothoracic and UE control with self care, reaching, carrying, lifting, house/yardwork, driving/computer work  [] (90353) Reviewed/Progressed HEP activities related to improving balance, coordination, kinesthetic sense, posture, motor skill, proprioception of scapular, scapulothoracic and UE control with self care, reaching, carrying, lifting, house/yardwork, driving/computer work      Manual Treatments:  PROM / STM / Oscillations-Mobs:  G-I, II, III, IV (PA's, Inf., Post.)  [x] (32196) Provided manual therapy to mobilize soft tissue/joints of cervical/CT, scapular GHJ and UE for the purpose of modulating pain, promoting relaxation,  increasing ROM, reducing/eliminating soft tissue swelling/inflammation/restriction, improving soft tissue extensibility and allowing for proper ROM for normal function with self care, reaching, carrying, lifting, house/yardwork, driving/computer work    Modalities:  Charges:  Timed Code Treatment Minutes:  30   Total Treatment Minutes: 40       [] EVAL (LOW) 68000 (typically 20 minutes face-to-face)  [] EVAL (MOD) 14472 (typically 30 minutes face-to-face)  [] EVAL (HIGH) 74927 (typically 45 minutes face-to-face)  [] RE-EVAL     [x] FW(92554) x 1                     [] IONTO (88881)  [] NMR (02774) x    [] VASO (50705)  [x] Manual (36104) x 1    [] Other:  [] TA (40416)x     [] Mech Traction (26866)  [] ES(attended) (03377)     [] ES (un) (64187):      If [facility] Please Indicate Time In/Out  CPT Code Time in Time out                                     GOALS:  Patient stated goal: Regain full use of R arm. [x] Progressing: [] Met: [] Not Met: [] Adjusted    Therapist goals for Patient:   Short Term Goals: To be achieved in: 2 weeks  1. Independent in HEP and progression per patient tolerance, in order to prevent re-injury. [x] Progressing: [] Met: [] Not Met: [] Adjusted  2. Patient will have a decrease in pain to facilitate improvement in movement, function, and ADLs as indicated by Functional Deficits. [x] Progressing: [] Met: [] Not Met: [] Adjusted    Long Term Goals: To be achieved in: 4 weeks  1. Disability index score of 20% or less for the DASH to assist with reaching prior level of function. [x] Progressing: [] Met: [] Not Met: [] Adjusted  Comment:  61% disability on Quick Dash as of 12/18/20  2. Patient will demonstrate increased R shoulder AROM to allow for proper joint functioning as indicated by patients Functional Deficits. [x] Progressing: [] Met: [] Not Met: [] Adjusted  Comment:  See objective measures above. 3. Patient will demonstrate an increase in R shoulder strength to within 5# HHD compared to uninvolved L UE to allow for proper functional mobility as indicated by patients Functional Deficits. [x] Progressing: [] Met: [] Not Met: [] Adjusted  Comment:  See objective measures above. 4. Patient will be able to perform all ADLs, self care tasks, and household chores using R UE without increased symptoms or restriction. [x] Progressing: [] Met: [] Not Met: [] Adjusted  Comment:  Independent with most ADLs and self care tasks except for tasks that require reaching behind back and OH where patient is still restricted in both ROM and strength. 5. Patient will be able to perform all work tasks using R UE without increased symptoms or restriction.   [x] Progressing: [] Met: [] Not Met: [] Adjusted Comment:  Patient not cleared to go back to work without restrictions due to continued strength deficits. Progression Towards Functional goals:  [x] Patient is progressing as expected towards functional goals listed. [] Progression is slowed due to complexities listed. [] Progression has been slowed due to co-morbidities. [] Plan just implemented, too soon to assess goals progression  [] Other:     ASSESSMENT:     Pt returns to therapy today after starting back to work and biceps is very sore from reaching and pulling on objects (no wt behind it). Pt reports increased tenderness across the anterior biceps/ arm as a result. Pt very upset today with work situation and her arm being very sore so she wasn't willing to focus much on strength. Focus was IASTM to bicep and general ROM emphasis    Return to Play: (if applicable)     []  Stage 1: Intro to Strength   []  Stage 2: Dynamic Strength and Intro to Plyometrics    []  Stage 3: Advanced Plyometrics and Intro to Throwing     []  Stage 4: Sport specific Training/Return to Sport     []  Ready to Return to Play, [employer]   []  Not Ready for Return to Sports   Comments:      Treatment/Activity Tolerance:  [x] Patient tolerated treatment well [x] Patient limited by fatique  [] Patient limited by pain    [] Patient limited by other medical complications   [] Other:     Overall Progression Towards Functional goals/ Treatment Progress Update:  [x] Patient is progressing as expected towards functional goals listed. [] Progression is slowed due to complexities/Impairments listed. [] Progression has been slowed due to co-morbidities.   [] Plan just implemented, too soon to assess goals progression <30days   [] Goals require adjustment due to lack of progress  [] Patient is not progressing as expected and requires additional follow up with physician  [] Other    Prognosis for POC: [x] Good [] Fair  [] Poor    Patient requires continued skilled intervention: [x] Yes  [] No       PLAN:  Progress R shoulder ROM into [place] ranges and R shoulder strength and stability as william. [x] Continue per plan of care [] Alter current plan (see comments)  [] Plan of care initiated [] Hold pending MD visit [] Discharge    Electronically signed by: Susann Hodgkin, PTA, [id number]       Note: If patient does not return for scheduled/recommended follow up visits, this note will serve as a discharge from care along with the most recent update on progress.

## 2021-01-26 ENCOUNTER — APPOINTMENT (OUTPATIENT)
Dept: PHYSICAL THERAPY | Age: 46
End: 2021-01-26
Payer: COMMERCIAL

## 2021-01-29 ENCOUNTER — APPOINTMENT (OUTPATIENT)
Dept: PHYSICAL THERAPY | Age: 46
End: 2021-01-29
Payer: COMMERCIAL

## 2021-02-01 ENCOUNTER — HOSPITAL ENCOUNTER (OUTPATIENT)
Dept: PHYSICAL THERAPY | Age: 46
Setting detail: THERAPIES SERIES
Discharge: HOME | End: 2021-02-01
Payer: COMMERCIAL

## 2021-02-01 NOTE — FLOWSHEET NOTE
[address], [facility]    Physical Therapy  Cancellation/No-show Note  Patient Name:  Jaqui Zavala  :  1975   Date:  2021  Cancelled visits to date: 0  No-shows to date: 0    For today's appointment patient:  []  Cancelled  []  Rescheduled appointment  [x]  No-show     Reason given by patient:  []  Patient ill  []  Conflicting appointment  []  No transportation    []  Conflict with work  [x]  No reason given  []  Other:     Comments:      Phone call information:   []  Phone call made today to patient at _ time at number provided:      []  Patient answered, conversation as follows:    []  Patient did not answer, message left as follows:  [x]  Phone call not made today  []  Phone call not needed - pt contacted us to cancel and provided reason for cancellation.      Electronically signed by:  Omid Arciniega, PT

## 2021-02-03 ENCOUNTER — HOSPITAL ENCOUNTER (OUTPATIENT)
Dept: PHYSICAL THERAPY | Age: 46
Setting detail: THERAPIES SERIES
Discharge: HOME | End: 2021-02-03
Payer: COMMERCIAL

## 2021-02-03 NOTE — PROGRESS NOTES
[facility]    Physical Therapy  Cancellation/No-show Note  Patient Name:  Hossein Castro  :  1975   Date:  2/3/2021  Cancelled visits to date: 2  No-shows to date: 2    For today's appointment patient:  [x]  Cancelled  []  Rescheduled appointment  [x]  No-show     Reason given by patient:  []  Patient ill  []  Conflicting appointment  []  No transportation    []  Conflict with work  []  No reason given  []  Other:     Comments:      Phone call information:   []  Phone call made today to patient at _ time at number provided:      []  Patient answered, conversation as follows:    []  Patient did not answer, message left as follows:  [x]  Phone call not made today  []  Phone call not needed - pt contacted us to cancel and provided reason for cancellation.      Electronically signed by:  Odalis Slaughter, [id number]

## 2021-03-10 ENCOUNTER — HOSPITAL ENCOUNTER (OUTPATIENT)
Dept: PHYSICAL THERAPY | Age: 46
Setting detail: THERAPIES SERIES
Discharge: HOME | End: 2021-03-10
Payer: COMMERCIAL

## 2021-03-10 PROCEDURE — 97110 THERAPEUTIC EXERCISES: CPT

## 2021-03-10 PROCEDURE — 97140 MANUAL THERAPY 1/> REGIONS: CPT

## 2021-03-10 NOTE — PLAN OF CARE
[address], [facility]      Physical Therapy Re-Certification Plan of Care/MD UPDATE      Dear Dr. Maya Hylton,    We had the pleasure of treating the following patient for physical therapy services at [address] [place] [address]. A summary of our findings can be found in the updated assessment below. This includes our plan of care. If you have any questions or concerns regarding these findings, please do not hesitate to contact me at the office phone number checked above.   Thank you for the referral.     Physician Signature:________________________________Date:__________________  By signing above (or electronic signature), therapists plan is approved by physician    Date Range Of Visits: 21 to 3/10/21  Total Visits to Date:  in   Overall Response to Treatment:   []Patient is responding well to treatment and improvement is noted with regards  to goals   []Patient should continue to improve in reasonable time if they continue HEP   []Patient has plateaued and is no longer responding to skilled PT intervention    []Patient is getting worse and would benefit from return to referring MD   []Patient unable to adhere to initial POC   [x]Other: Returns back to PT due to R anterior shoulder pain with repetitive R UE work tasks    Physical Therapy Treatment Note/ Progress Report:     Date:  3/10/2021    Patient Name:  Derrick Kendall    :  1975  MRN: [id number]  Medical/Treatment Diagnosis Information:  · Diagnosis: Z47.89 Encounter for orthopedic aftercare; M75.111 Incomplete R RTC tear; M25.511 R shoulder pain  · Treatment Diagnosis: Z47.89 Encounter for orthopedic aftercare; M75.111 Incomplete R RTC tear; M25.511 R shoulder pain  Insurance/Certification information:  PT Insurance Information: [facility] [place] - 61 PT visits /0 used  Physician Information:  Referring Practitioner: Frank Chen of care signed (Y/N): Date of Patient follow up with Physician:      Progress Report: [x]  Yes  []  No     Functional Scale: 61% disability - Mitzy Dye TBA  Date:  3/10/21    Date Range for reporting period:  Beginnin20  Ending:  NA    Progress report due (10 Rx/or 30 days whichever is less):      Recertification due (POC duration/ or 90 days whichever is less): 4/10/21    Visit # Insurance Allowable Auth Needed    5 + 25 in  [facility] [place] - 60 PT visits/0 used []Yes    [x]No     Pain level:  0/10     SUBJECTIVE:    Patient having pain in the front of her R shoulder with performance of repetitive activities at work. 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 OBJECTIVE:    Observation:    Test measurements:     (3/10/21):    Left Right   Shoulder Flex 180 140/165   Shoulder Abd 180 123/175   Shoulder ER 70 @ 90/90 (passive); Reaches behind head to T4.  85 @ 90/90; 95 in scapular plane (passive); Reaches behind head to T1. Shoulder  @ 90/90 (passive); Reaches behind back to T4.   74  @90/90; Reaches behind back to T12.                           Strength  MMT dynamometer Left Right   Shoulder Flex 17.1# 15.5# Compensates with GH elevation. Shoulder Abd 20.3# 16.5# Compensates with L trunk SBing. Shoulder ER 27.6# 12.7#   Shoulder IR 17.8# 21.3#       RESTRICTIONS/PRECAUTIONS: Situs inversus. Heterotaxy syndrome. Type II diabetes. High BP. Previous R & L knee scopes. [place] barger tear on esophagus.  R shoulder scope, RTC repair & augmentation, SAD, biceps tenodesis 20    Exercises/Interventions:   Therapeutic Exercise  Min:  32 Wt / Resistance Sets/sec Reps Notes   Pulleys scaption   3 min As needed   Cross body stretch  20-30s 3-5    Doorway stretch  20-30s 3-5    IR stretch behind back w/[id number]   Supine SA punch 4# 2 15    Sidelying ER  Sidelying punch 3#   2 15 each       Cable column extension   Cable column rows  Cable column lat pulldown 10#  10#  40# 2 15 each Increased repetitions to mimic work duties requiring repetitive UE tasks   TB IR/ER green 2 15 each           Tests and measures. 5 min Progress note. Patient education. 5 min Updated HEP. Manual Intervention  Min:  15       R shoulder PROM/stretching       R GH joint mobs - posterior glide, inferior glideGr. II-III                            NMR Re-education  Min:  3       Wall ball rolls flex/abd cw/ccw Pink ball (light) 1 20 each direction    Wall push ups  2 10                      Therapeutic Exercise and NMR EXR  [x] (75466) Provided verbal/tactile cueing for activities related to strengthening, flexibility, endurance, ROM  for improvements in scapular, scapulothoracic and UE control with self care, reaching, carrying, lifting, house/yardwork, driving/computer work. [x] (53337) Provided verbal/tactile cueing for activities related to improving balance, coordination, kinesthetic sense, posture, motor skill, proprioception  to assist with  scapular, scapulothoracic and UE control with self care, reaching, carrying, lifting, house/yardwork, driving/computer work. Therapeutic Activities:    [] (56718 or 30490) Provided verbal/tactile cueing for activities related to improving balance, coordination, kinesthetic sense, posture, motor skill, proprioception and motor activation to allow for proper function of scapular, scapulothoracic and UE control with self care, carrying, lifting, driving/computer work.      Home Exercise Program:    [x] (33773) Reviewed/Progressed HEP activities related to strengthening, flexibility, endurance, ROM of scapular, scapulothoracic and UE control with self care, reaching, carrying, lifting, house/yardwork, driving/computer work  [x] (29300) Reviewed/Progressed HEP activities related to improving balance, coordination, kinesthetic sense, posture, motor skill, proprioception of scapular, scapulothoracic and UE control with self care, reaching, carrying, lifting, house/yardwork, driving/computer work      Manual Treatments:  PROM / STM / Oscillations-Mobs:  G-I, II, III, IV (PA's, Inf., Post.)  [x] (81689) Provided manual therapy to mobilize soft tissue/joints of cervical/CT, scapular GHJ and UE for the purpose of modulating pain, promoting relaxation,  increasing ROM, reducing/eliminating soft tissue swelling/inflammation/restriction, improving soft tissue extensibility and allowing for proper ROM for normal function with self care, reaching, carrying, lifting, house/yardwork, driving/computer work    Modalities:  Ice bag to go. 3/10Charges:  Timed Code Treatment Minutes:  50   Total Treatment Minutes: 50       [] EVAL (LOW) 36812 (typically 20 minutes face-to-face)  [] EVAL (MOD) 36277 (typically 30 minutes face-to-face)  [] EVAL (HIGH) 20575 (typically 45 minutes face-to-face)  [] RE-EVAL     [x] FQ(05801) x 2                     [] IONTO (78035)  [] NMR (85014) x    [] VASO (86831)  [x] Manual (71733) x 1    [] Other:  [] TA (69917)x     [] Mech Traction (71454)  [] ES(attended) (14266)     [] ES (un) (10796): If BWC Please Indicate Time In/Out  CPT Code Time in Time out                                     GOALS:  Patient stated goal: Regain full use of R arm. [x] Progressing: [] Met: [] Not Met: [] Adjusted    Therapist goals for Patient:   Short Term Goals: To be achieved in: 2 weeks  1. Independent in HEP and progression per patient tolerance, in order to prevent re-injury. [x] Progressing: [] Met: [] Not Met: [] Adjusted  2. Patient will have a decrease in pain to facilitate improvement in movement, function, and ADLs as indicated by Functional Deficits. [x] Progressing: [] Met: [] Not Met: [] Adjusted  Comment:  Pain present in anterior R shoulder and proximal biceps tendon with repetitive R UE activities. Long Term Goals: To be achieved in: 4 weeks  1.  Disability index score of 20% or less for the DASH to assist with reaching prior level of function. [x] Progressing: [] Met: [] Not Met: [] Adjusted  Comment:  TBA  2. Patient will demonstrate increased R shoulder AROM to allow for proper joint functioning as indicated by patients Functional Deficits. [x] Progressing: [] Met: [] Not Met: [] Adjusted  Comment:  See objective measures above. 3. Patient will demonstrate an increase in R shoulder strength to within 5# HHD compared to uninvolved L UE to allow for proper functional mobility as indicated by patients Functional Deficits. [x] Progressing: [] Met: [] Not Met: [] Adjusted  Comment:  See objective measures above. 4. Patient will be able to perform all ADLs, self care tasks, and household chores using R UE without increased symptoms or restriction. [] Progressing: [x] Met: [] Not Met: [] Adjusted    5. Patient will be able to perform all work tasks using R UE without increased symptoms or restriction. [x] Progressing: [] Met: [] Not Met: [] Adjusted Comment:  Increased R anterior shoulder and proximal biceps tendon pain with performance of repetitive R UE activities at work. Progression Towards Functional goals:  [x] Patient is progressing as expected towards functional goals listed. [] Progression is slowed due to complexities listed. [] Progression has been slowed due to co-morbidities. 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 Patient reports increased anterior R shoulder pain over her proximal biceps tendon after repetitive R UE activity at work. Patient admits she has done no exercise since she was seen in PT last on 1/21/21. Patient demonstrates only minor losses in R active shoulder ROM particularly with OH flexion and abduction. R shoulder passive ROM is the same as previously measured with IR behind back still limited the same as it was when patient was last seen.  R shoulder strength is still severely limited with compensatory strategies present particularly when patient raises her R arm into OH ranges including use of scapular elevation and trunk SBing to achieve shoulder flexion and abduction respectively. Educated patient that strength deficits are likely the reason why she is still having so much pain with repetitive work activities. Updated HEP with stretching tasks to perform daily to maintain R shoulder mobility. Provided patient with strength regimen to perform at the gym 3-4x per week in addition to PT to quickly progress R shoulder strength levels to decrease pain and improve tolerance to work tasks. Return to Play: (if applicable)     []  Stage 1: Intro to Strength   []  Stage 2: Dynamic Strength and Intro to Plyometrics    []  Stage 3: Advanced Plyometrics and Intro to Throwing     []  Stage 4: Sport specific Training/Return to Sport     []  Ready to Return to Play, [employer]   []  Not Ready for Return to Sports   Comments:      Treatment/Activity Tolerance:  [x] Patient tolerated treatment well [x] Patient limited by fatique  [] Patient limited by pain    [] Patient limited by other medical complications   [] Other:     Overall Progression Towards Functional goals/ Treatment Progress Update:  [x] Patient is progressing as expected towards functional goals listed. [] Progression is slowed due to complexities/Impairments listed. [] Progression has been slowed due to co-morbidities. [] Plan just implemented, too soon to assess goals progression <30days   [] Goals require adjustment due to lack of progress  [] Patient is not progressing as expected and requires additional follow up with physician  [] Other    Prognosis for POC: [x] Good [] Fair  [] Poor    Patient requires continued skilled intervention: [x] Yes  [] No       PLAN:  Progress R shoulder strength and stability as william.    [x] Continue per plan of care [] Alter current plan (see comments)  [] Plan of care initiated [] Hold pending MD visit [] Discharge    Electronically signed by: [place], [id number]       Note: If patient does not return for scheduled/recommended follow up visits, this note will serve as a discharge from care along with the most recent update on progress.

## 2021-03-17 ENCOUNTER — APPOINTMENT (OUTPATIENT)
Dept: PHYSICAL THERAPY | Age: 46
End: 2021-03-17
Payer: COMMERCIAL

## 2021-03-19 ENCOUNTER — HOSPITAL ENCOUNTER (OUTPATIENT)
Dept: PHYSICAL THERAPY | Age: 46
Setting detail: THERAPIES SERIES
Discharge: HOME | End: 2021-03-19
Payer: COMMERCIAL

## 2021-03-19 NOTE — FLOWSHEET NOTE
[address], [facility]    Physical Therapy  Cancellation/No-show Note  Patient Name:  Devora Adams  :  1975   Date:  3/19/2021  Cancelled visits to date: 1  No-shows to date: 1    For today's appointment patient:  []  Cancelled  []  Rescheduled appointment  [x]  No-show     Reason given by patient:  []  Patient ill  []  Conflicting appointment  []  No transportation    []  Conflict with work  []  No reason given  []  Other:     Comments:      Phone call information:   []  Phone call made today to patient at _ time at number provided:      []  Patient answered, conversation as follows:    []  Patient did not answer, message left as follows:  [x]  Phone call not made today  [x]  Phone call not needed - pt contacted us to cancel and provided reason for cancellation.      Electronically signed by:  Odalis Abreu, [id number]

## 2021-03-24 ENCOUNTER — APPOINTMENT (OUTPATIENT)
Dept: PHYSICAL THERAPY | Age: 46
End: 2021-03-24
Payer: COMMERCIAL

## 2021-03-26 ENCOUNTER — APPOINTMENT (OUTPATIENT)
Dept: PHYSICAL THERAPY | Age: 46
End: 2021-03-26
Payer: COMMERCIAL

## 2021-05-22 ENCOUNTER — HOSPITAL ENCOUNTER (EMERGENCY)
Age: 46
Discharge: HOME | End: 2021-05-22
Payer: COMMERCIAL

## 2021-05-22 ENCOUNTER — APPOINTMENT (OUTPATIENT)
Dept: GENERAL RADIOLOGY | Age: 46
End: 2021-05-22
Payer: COMMERCIAL

## 2021-05-22 VITALS
RESPIRATION RATE: 18 BRPM | OXYGEN SATURATION: 98 % | BODY MASS INDEX: 32.28 KG/M2 | SYSTOLIC BLOOD PRESSURE: 143 MMHG | TEMPERATURE: 98.1 F | HEIGHT: 61 IN | WEIGHT: 171 LBS | DIASTOLIC BLOOD PRESSURE: 78 MMHG | HEART RATE: 81 BPM

## 2021-05-22 DIAGNOSIS — S69.90XA THUMB INJURY, INITIAL ENCOUNTER: ICD-10-CM

## 2021-05-22 DIAGNOSIS — S90.31XA CONTUSION OF RIGHT FOOT, INITIAL ENCOUNTER: Primary | ICD-10-CM

## 2021-05-22 PROCEDURE — 6370000000 HC RX 637 (ALT 250 FOR IP): Performed by: PHYSICIAN ASSISTANT

## 2021-05-22 PROCEDURE — 99283 EMERGENCY DEPT VISIT LOW MDM: CPT

## 2021-05-22 PROCEDURE — 29125 APPL SHORT ARM SPLINT STATIC: CPT

## 2021-05-22 PROCEDURE — 73630 X-RAY EXAM OF FOOT: CPT

## 2021-05-22 PROCEDURE — 73130 X-RAY EXAM OF HAND: CPT

## 2021-05-22 RX ORDER — ONDANSETRON 4 MG/1
4 TABLET, ORALLY DISINTEGRATING ORAL ONCE
Status: COMPLETED | OUTPATIENT
Start: 2021-05-22 | End: 2021-05-22

## 2021-05-22 RX ORDER — HYDROCODONE BITARTRATE AND ACETAMINOPHEN 5; 325 MG/1; MG/1
2 TABLET ORAL ONCE
Status: COMPLETED | OUTPATIENT
Start: 2021-05-22 | End: 2021-05-22

## 2021-05-22 RX ORDER — HYDROCODONE BITARTRATE AND ACETAMINOPHEN 5; 325 MG/1; MG/1
TABLET ORAL
Status: DISCONTINUED
Start: 2021-05-22 | End: 2021-05-22 | Stop reason: HOSPADM

## 2021-05-22 RX ADMIN — HYDROCODONE BITARTRATE AND ACETAMINOPHEN 2 TABLET: 5; 325 TABLET ORAL at 12:36

## 2021-05-22 RX ADMIN — ONDANSETRON 4 MG: 4 TABLET, ORALLY DISINTEGRATING ORAL at 12:36

## 2021-05-22 ASSESSMENT — PAIN SCALES - GENERAL
PAINLEVEL_OUTOF10: 8
PAINLEVEL_OUTOF10: 8

## 2021-05-22 ASSESSMENT — ENCOUNTER SYMPTOMS
VOMITING: 0
NAUSEA: 0
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
## 2021-05-27 ENCOUNTER — TELEPHONE (OUTPATIENT)
Dept: ORTHOPEDIC SURGERY | Age: 46
End: 2021-05-27

## 2021-05-27 NOTE — TELEPHONE ENCOUNTER
Appointment Request     Patient requesting earlier appointment: Yes  Appointment offered to patient: Yes  Patient Contact Number: [phone]    Patient wanting to know if she can be seen sooner at the [facility] location for her left hand.

## 2021-06-02 ENCOUNTER — OFFICE VISIT (OUTPATIENT)
Dept: ORTHOPEDIC SURGERY | Age: 46
End: 2021-06-02
Payer: COMMERCIAL

## 2021-06-02 VITALS — BODY MASS INDEX: 32.28 KG/M2 | WEIGHT: 171 LBS | RESPIRATION RATE: 18 BRPM | HEIGHT: 61 IN

## 2021-06-02 DIAGNOSIS — S63.642A SPRAIN OF METACARPOPHALANGEAL (MCP) JOINT OF LEFT THUMB, INITIAL ENCOUNTER: Primary | ICD-10-CM

## 2021-06-02 PROCEDURE — 99203 OFFICE O/P NEW LOW 30 MIN: CPT | Performed by: ORTHOPAEDIC SURGERY

## 2021-06-02 PROCEDURE — 1036F TOBACCO NON-USER: CPT | Performed by: ORTHOPAEDIC SURGERY

## 2021-06-02 PROCEDURE — G8427 DOCREV CUR MEDS BY ELIG CLIN: HCPCS | Performed by: ORTHOPAEDIC SURGERY

## 2021-06-02 PROCEDURE — G8417 CALC BMI ABV UP PARAM F/U: HCPCS | Performed by: ORTHOPAEDIC SURGERY
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
## 2022-03-16 NOTE — PROGRESS NOTES
Name_______________________________________Printed:____________________  Date and time of surgery___03/21____1130_________________Arrival Time:___1000__[facility]___________   1. The instructions given regarding when and if a patient needs to stop oral intake prior to surgery varies. Follow the specific instructions you were given                  _X__Nothing to eat or to drink after Midnight the night before.                   ____Carbo loading or ERAS instructions will be given to select patients-if you have been given those instructions -please do the following                           The evening before your surgery after dinner before midnight drink 40 ounces of gatorade. If you are diabetic use sugar free. The morning of surgery drink 40 ounces of water. This needs to be finished 3 hours prior to your surgery start time. 2. Take the following pills with a small sip of water on the morning of surgery____Amlodipine_______________________________________________                  Do not take blood pressure medications ending in pril or sartan the ojsé prior to surgery or the morning of surgery_   3. Aspirin, Ibuprofen, Advil, Naproxen, Vitamin E and other Anti-inflammatory products and supplements should be stopped for 5 -7days before surgery or as directed by your physician. 4. Check with your Doctor regarding stopping Plavix, Coumadin,Eliquis, Lovenox,Effient,Pradaxa,Xarelto, Fragmin or other blood thinners and follow their instructions. 5. Do not smoke, and do not drink any alcoholic beverages 24 hours prior to surgery. This includes NA Beer. Refrain from the usage of any recreational drugs. 6. You may brush your teeth and gargle the morning of surgery. DO NOT SWALLOW WATER   7. You MUST make arrangements for a responsible adult to stay on site while you are here and take you home after your surgery. You will not be allowed to leave alone or drive yourself home.   It is strongly suggested someone stay with you the first 24 hrs. Your surgery will be cancelled if you do not have a ride home. 8. A parent/legal guardian must accompany a child scheduled for surgery and plan to stay at the hospital until the child is discharged. Please do not bring other children with you. 9. Please wear simple, loose fitting clothing to the hospital.  Cayla Fus not bring valuables (money, credit cards, checkbooks, etc.) Do not wear any makeup (including no eye makeup) or nail polish on your fingers or toes. 10. DO NOT wear any jewelry or piercings on day of surgery. All body piercing jewelry must be removed. 11. If you have ___dentures, they will be removed before going to the OR; we will provide you a container. If you wear ___contact lenses or ___glasses, they will be removed; please bring a case for them. 12. Please see your family doctor/pediatrician for a history & physical and/or concerning medications. Bring any test results/reports from your physician's office. PCP__________________Phone___________H&P Appt. Date________             13 If you  have a Living Will and Durable Power of  for Healthcare, please bring in a copy. 15. Notify your Surgeon if you develop any illness between now and surgery  time, cough, cold, fever, sore throat, nausea, vomiting, etc.  Please notify your surgeon if you experience dizziness, shortness of breath or blurred vision between now & the time of your surgery             15. DO NOT shave your operative site 96 hours prior to surgery. For face & neck surgery, men may use an electric razor 48 hours prior to surgery. 16. Shower the night before or morning of surgery using an antibacterial soap or as you have been instructed. 17. To provide excellent care visitors will be limited to one in the room at any given time. 18.  Please bring picture ID and insurance card.              19.  Visit our web site for additional information:  [email or web address]              20.During flu season no children under the age of 15 are permitted in the hospital for the safety of all patients. 21. If you take a long acting insulin in the evening only  take half of your usual  dose the night  before your procedure              22. If you use a c-pap please bring DOS if staying overnight,             23.For your [facility] has a pharmacy on site to fill your prescriptions. 24. If you use oxygen and have a portable tank please bring it  with you the DOS             25. Bring a complete list of all your medications with name and dose include any supplements. 26. Other__________________________________________   *Please call pre admission testing if you any further questions   [address]    [place]  [phone]   [facility] [address] MARTINA BRADLEY   [phone]           COVID TESTING    _X__ Covid test to be done 3-5 days prior to scheduled surgery -patient aware they are REQUIRED to bring a copy of the negative result DOS-if they receive a positive result to notify their surgeon         If known - indicate where patient is getting covid test done ___Gloria 03/15 Will bring DOS______Had Casper Stevenson Jan 2022__________________________________________________    ___ Rapid - DOS    ___ Other___________________________________      Ashley  POLICY(subject to change)    There is a one visitor policy at [facility] for all surgeries and endoscopies. Whether the visitor can stay or will be asked to wait in the car will depend on the current policy and if social distancing can be maintained. The policy is subject to change at any time. Please make sure the visitor has a cell phone that is on,charged and able to accept calls, as this may be the way that the staff communicates with them. Pain management is NO VISITOR policyThe patients ride is expected to remain in the car with a cell phone for communication. If the ride is leaving the hospital grounds please make sure they are back in time for pickup. Have the patient inform the staff on arrival what their rides plans are while the patient is in the facility. At the MAIN there is one visitor allowed. Please note that the visitor policy is subject to change. All above information reviewed with patient in person or by phone. Patient verbalizes understanding. All questions and concerns addressed.                                                                                                  Patient/Rep___Patient- per phone_________________                                                                                                                                    PRE OP INSTRUCTIONS

## 2022-03-21 ENCOUNTER — HOSPITAL ENCOUNTER (OUTPATIENT)
Age: 47
Setting detail: OUTPATIENT SURGERY
Discharge: HOME | End: 2022-03-21
Attending: ORTHOPAEDIC SURGERY | Admitting: ORTHOPAEDIC SURGERY
Payer: COMMERCIAL

## 2022-03-21 ENCOUNTER — ANESTHESIA EVENT (OUTPATIENT)
Dept: OPERATING ROOM | Age: 47
End: 2022-03-21
Payer: COMMERCIAL

## 2022-03-21 ENCOUNTER — ANESTHESIA (OUTPATIENT)
Dept: OPERATING ROOM | Age: 47
End: 2022-03-21
Payer: COMMERCIAL

## 2022-03-21 VITALS
HEART RATE: 85 BPM | HEIGHT: 61 IN | TEMPERATURE: 97.1 F | DIASTOLIC BLOOD PRESSURE: 90 MMHG | OXYGEN SATURATION: 100 % | BODY MASS INDEX: 34.17 KG/M2 | WEIGHT: 181 LBS | SYSTOLIC BLOOD PRESSURE: 142 MMHG | RESPIRATION RATE: 16 BRPM

## 2022-03-21 VITALS
OXYGEN SATURATION: 100 % | DIASTOLIC BLOOD PRESSURE: 95 MMHG | TEMPERATURE: 95.5 F | SYSTOLIC BLOOD PRESSURE: 148 MMHG | RESPIRATION RATE: 15 BRPM

## 2022-03-21 DIAGNOSIS — S46.012D TRAUMATIC COMPLETE TEAR OF LEFT ROTATOR CUFF, SUBSEQUENT ENCOUNTER: Primary | ICD-10-CM

## 2022-03-21 LAB
GLUCOSE BLD-MCNC: 194 MG/DL (ref 70–99)
GLUCOSE BLD-MCNC: 214 MG/DL (ref 70–99)
HCG(URINE) PREGNANCY TEST: NEGATIVE
PERFORMED ON: ABNORMAL
PERFORMED ON: ABNORMAL

## 2022-03-21 PROCEDURE — C1762 CONN TISS, HUMAN(INC FASCIA): HCPCS | Performed by: ORTHOPAEDIC SURGERY

## 2022-03-21 PROCEDURE — 6360000002 HC RX W HCPCS: Performed by: ORTHOPAEDIC SURGERY

## 2022-03-21 PROCEDURE — 6360000002 HC RX W HCPCS: Performed by: NURSE ANESTHETIST, CERTIFIED REGISTERED

## 2022-03-21 PROCEDURE — 84703 CHORIONIC GONADOTROPIN ASSAY: CPT

## 2022-03-21 PROCEDURE — 2580000003 HC RX 258: Performed by: ORTHOPAEDIC SURGERY

## 2022-03-21 PROCEDURE — 3700000000 HC ANESTHESIA ATTENDED CARE: Performed by: ORTHOPAEDIC SURGERY

## 2022-03-21 PROCEDURE — 3700000001 HC ADD 15 MINUTES (ANESTHESIA): Performed by: ORTHOPAEDIC SURGERY

## 2022-03-21 PROCEDURE — 3600000004 HC SURGERY LEVEL 4 BASE: Performed by: ORTHOPAEDIC SURGERY

## 2022-03-21 PROCEDURE — 2500000003 HC RX 250 WO HCPCS: Performed by: NURSE ANESTHETIST, CERTIFIED REGISTERED

## 2022-03-21 PROCEDURE — 7100000000 HC PACU RECOVERY - FIRST 15 MIN: Performed by: ORTHOPAEDIC SURGERY

## 2022-03-21 PROCEDURE — 2500000003 HC RX 250 WO HCPCS: Performed by: ORTHOPAEDIC SURGERY

## 2022-03-21 PROCEDURE — 6360000002 HC RX W HCPCS

## 2022-03-21 PROCEDURE — 2709999900 HC NON-CHARGEABLE SUPPLY: Performed by: ORTHOPAEDIC SURGERY

## 2022-03-21 PROCEDURE — 7100000010 HC PHASE II RECOVERY - FIRST 15 MIN: Performed by: ORTHOPAEDIC SURGERY

## 2022-03-21 PROCEDURE — 6360000002 HC RX W HCPCS: Performed by: ANESTHESIOLOGY

## 2022-03-21 PROCEDURE — 7100000011 HC PHASE II RECOVERY - ADDTL 15 MIN: Performed by: ORTHOPAEDIC SURGERY

## 2022-03-21 PROCEDURE — 3600000014 HC SURGERY LEVEL 4 ADDTL 15MIN: Performed by: ORTHOPAEDIC SURGERY

## 2022-03-21 PROCEDURE — 7100000001 HC PACU RECOVERY - ADDTL 15 MIN: Performed by: ORTHOPAEDIC SURGERY

## 2022-03-21 PROCEDURE — 64415 NJX AA&/STRD BRCH PLXS IMG: CPT | Performed by: ANESTHESIOLOGY

## 2022-03-21 PROCEDURE — 2720000010 HC SURG SUPPLY STERILE: Performed by: ORTHOPAEDIC SURGERY

## 2022-03-21 PROCEDURE — C1713 ANCHOR/SCREW BN/BN,TIS/BN: HCPCS | Performed by: ORTHOPAEDIC SURGERY

## 2022-03-21 DEVICE — MULTIFIX S-ULTRA 5.5MM KNOTLESS ANCHOR
Type: IMPLANTABLE DEVICE | Site: SHOULDER | Status: FUNCTIONAL
Brand: MULTIFIX

## 2022-03-21 DEVICE — HEALICOIL RG SA 4.75MM W/2 UB-BL                                    CBRD BL
Type: IMPLANTABLE DEVICE | Site: SHOULDER | Status: FUNCTIONAL
Brand: HEALICOIL

## 2022-03-21 DEVICE — ANCHOR TEND 8 FOR REGENETEN BIOINDUCTIVE IMPL SYS: Type: IMPLANTABLE DEVICE | Site: SHOULDER | Status: FUNCTIONAL

## 2022-03-21 RX ORDER — HYDRALAZINE HYDROCHLORIDE 20 MG/ML
INJECTION INTRAMUSCULAR; INTRAVENOUS
Status: COMPLETED
Start: 2022-03-21 | End: 2022-03-21

## 2022-03-21 RX ORDER — HYDRALAZINE HYDROCHLORIDE 20 MG/ML
10 INJECTION INTRAMUSCULAR; INTRAVENOUS ONCE
Status: COMPLETED | OUTPATIENT
Start: 2022-03-21 | End: 2022-03-21

## 2022-03-21 RX ORDER — CLINDAMYCIN PHOSPHATE 900 MG/50ML
900 INJECTION INTRAVENOUS
Status: DISCONTINUED | OUTPATIENT
Start: 2022-03-21 | End: 2022-03-21 | Stop reason: ALTCHOICE

## 2022-03-21 RX ORDER — DEXAMETHASONE SODIUM PHOSPHATE 4 MG/ML
INJECTION, SOLUTION INTRA-ARTICULAR; INTRALESIONAL; INTRAMUSCULAR; INTRAVENOUS; SOFT TISSUE PRN
Status: DISCONTINUED | OUTPATIENT
Start: 2022-03-21 | End: 2022-03-21 | Stop reason: SDUPTHER

## 2022-03-21 RX ORDER — ONDANSETRON 2 MG/ML
4 INJECTION INTRAMUSCULAR; INTRAVENOUS
Status: DISCONTINUED | OUTPATIENT
Start: 2022-03-21 | End: 2022-03-21 | Stop reason: HOSPADM

## 2022-03-21 RX ORDER — MEPERIDINE HYDROCHLORIDE 25 MG/ML
12.5 INJECTION INTRAMUSCULAR; INTRAVENOUS; SUBCUTANEOUS EVERY 5 MIN PRN
Status: DISCONTINUED | OUTPATIENT
Start: 2022-03-21 | End: 2022-03-21 | Stop reason: HOSPADM

## 2022-03-21 RX ORDER — FENTANYL CITRATE 50 UG/ML
50 INJECTION, SOLUTION INTRAMUSCULAR; INTRAVENOUS ONCE
Status: COMPLETED | OUTPATIENT
Start: 2022-03-21 | End: 2022-03-21

## 2022-03-21 RX ORDER — MEPERIDINE HYDROCHLORIDE 50 MG/ML
INJECTION INTRAMUSCULAR; INTRAVENOUS; SUBCUTANEOUS
Status: COMPLETED
Start: 2022-03-21 | End: 2022-03-21

## 2022-03-21 RX ORDER — SODIUM CHLORIDE 9 MG/ML
INJECTION, SOLUTION INTRAVENOUS CONTINUOUS
Status: DISCONTINUED | OUTPATIENT
Start: 2022-03-21 | End: 2022-03-21 | Stop reason: HOSPADM

## 2022-03-21 RX ORDER — FENTANYL CITRATE 50 UG/ML
INJECTION, SOLUTION INTRAMUSCULAR; INTRAVENOUS PRN
Status: DISCONTINUED | OUTPATIENT
Start: 2022-03-21 | End: 2022-03-21 | Stop reason: SDUPTHER

## 2022-03-21 RX ORDER — KETOROLAC TROMETHAMINE 30 MG/ML
INJECTION, SOLUTION INTRAMUSCULAR; INTRAVENOUS PRN
Status: DISCONTINUED | OUTPATIENT
Start: 2022-03-21 | End: 2022-03-21 | Stop reason: SDUPTHER

## 2022-03-21 RX ORDER — MIDAZOLAM HYDROCHLORIDE 2 MG/2ML
1 INJECTION, SOLUTION INTRAMUSCULAR; INTRAVENOUS ONCE
Status: COMPLETED | OUTPATIENT
Start: 2022-03-21 | End: 2022-03-21

## 2022-03-21 RX ORDER — SODIUM CHLORIDE 0.9 % (FLUSH) 0.9 %
5-40 SYRINGE (ML) INJECTION EVERY 12 HOURS SCHEDULED
Status: DISCONTINUED | OUTPATIENT
Start: 2022-03-21 | End: 2022-03-21 | Stop reason: HOSPADM

## 2022-03-21 RX ORDER — LIDOCAINE HYDROCHLORIDE 10 MG/ML
0.5 INJECTION, SOLUTION EPIDURAL; INFILTRATION; INTRACAUDAL; PERINEURAL ONCE
Status: DISCONTINUED | OUTPATIENT
Start: 2022-03-21 | End: 2022-03-21 | Stop reason: HOSPADM

## 2022-03-21 RX ORDER — ROCURONIUM BROMIDE 10 MG/ML
INJECTION, SOLUTION INTRAVENOUS PRN
Status: DISCONTINUED | OUTPATIENT
Start: 2022-03-21 | End: 2022-03-21 | Stop reason: SDUPTHER

## 2022-03-21 RX ORDER — ONDANSETRON 2 MG/ML
INJECTION INTRAMUSCULAR; INTRAVENOUS PRN
Status: DISCONTINUED | OUTPATIENT
Start: 2022-03-21 | End: 2022-03-21 | Stop reason: SDUPTHER

## 2022-03-21 RX ORDER — HYDROMORPHONE HCL 110MG/55ML
0.5 PATIENT CONTROLLED ANALGESIA SYRINGE INTRAVENOUS EVERY 5 MIN PRN
Status: DISCONTINUED | OUTPATIENT
Start: 2022-03-21 | End: 2022-03-21 | Stop reason: HOSPADM

## 2022-03-21 RX ORDER — LIDOCAINE HYDROCHLORIDE 20 MG/ML
INJECTION, SOLUTION EPIDURAL; INFILTRATION; INTRACAUDAL; PERINEURAL PRN
Status: DISCONTINUED | OUTPATIENT
Start: 2022-03-21 | End: 2022-03-21 | Stop reason: SDUPTHER

## 2022-03-21 RX ORDER — PROPOFOL 10 MG/ML
INJECTION, EMULSION INTRAVENOUS PRN
Status: DISCONTINUED | OUTPATIENT
Start: 2022-03-21 | End: 2022-03-21 | Stop reason: SDUPTHER

## 2022-03-21 RX ORDER — SODIUM CHLORIDE 0.9 % (FLUSH) 0.9 %
5-40 SYRINGE (ML) INJECTION PRN
Status: DISCONTINUED | OUTPATIENT
Start: 2022-03-21 | End: 2022-03-21 | Stop reason: HOSPADM

## 2022-03-21 RX ORDER — PHENYLEPHRINE HCL IN 0.9% NACL 1 MG/10 ML
SYRINGE (ML) INTRAVENOUS PRN
Status: DISCONTINUED | OUTPATIENT
Start: 2022-03-21 | End: 2022-03-21 | Stop reason: SDUPTHER

## 2022-03-21 RX ORDER — SODIUM CHLORIDE 9 MG/ML
25 INJECTION, SOLUTION INTRAVENOUS PRN
Status: DISCONTINUED | OUTPATIENT
Start: 2022-03-21 | End: 2022-03-21 | Stop reason: HOSPADM

## 2022-03-21 RX ORDER — OXYCODONE HYDROCHLORIDE 5 MG/1
5 TABLET ORAL EVERY 4 HOURS PRN
Qty: 42 TABLET | Refills: 0 | Status: SHIPPED | OUTPATIENT
Start: 2022-03-21 | End: 2022-03-28

## 2022-03-21 RX ORDER — ACETAMINOPHEN 500 MG
1000 TABLET ORAL EVERY 6 HOURS PRN
Qty: 120 TABLET | Refills: 5 | Status: SHIPPED | OUTPATIENT
Start: 2022-03-21

## 2022-03-21 RX ORDER — SUCCINYLCHOLINE CHLORIDE 20 MG/ML
INJECTION INTRAMUSCULAR; INTRAVENOUS PRN
Status: DISCONTINUED | OUTPATIENT
Start: 2022-03-21 | End: 2022-03-21 | Stop reason: SDUPTHER

## 2022-03-21 RX ADMIN — DEXAMETHASONE SODIUM PHOSPHATE 8 MG: 4 INJECTION, SOLUTION INTRAMUSCULAR; INTRAVENOUS at 12:51

## 2022-03-21 RX ADMIN — HYDRALAZINE HYDROCHLORIDE 10 MG: 20 INJECTION INTRAMUSCULAR; INTRAVENOUS at 15:24

## 2022-03-21 RX ADMIN — FENTANYL CITRATE 50 MCG: 50 INJECTION, SOLUTION INTRAMUSCULAR; INTRAVENOUS at 12:40

## 2022-03-21 RX ADMIN — KETOROLAC TROMETHAMINE 30 MG: 60 INJECTION, SOLUTION INTRAMUSCULAR at 14:06

## 2022-03-21 RX ADMIN — PROPOFOL 150 MG: 10 INJECTION, EMULSION INTRAVENOUS at 12:44

## 2022-03-21 RX ADMIN — FENTANYL CITRATE 50 MCG: 50 INJECTION INTRAMUSCULAR; INTRAVENOUS at 11:19

## 2022-03-21 RX ADMIN — LIDOCAINE HYDROCHLORIDE 100 MG: 20 INJECTION, SOLUTION EPIDURAL; INFILTRATION; INTRACAUDAL; PERINEURAL at 12:40

## 2022-03-21 RX ADMIN — ROCURONIUM BROMIDE 10 MG: 10 INJECTION, SOLUTION INTRAVENOUS at 12:44

## 2022-03-21 RX ADMIN — ONDANSETRON 4 MG: 2 INJECTION INTRAMUSCULAR; INTRAVENOUS at 14:05

## 2022-03-21 RX ADMIN — SUCCINYLCHOLINE CHLORIDE 120 MG: 20 INJECTION, SOLUTION INTRAMUSCULAR; INTRAVENOUS at 12:44

## 2022-03-21 RX ADMIN — MIDAZOLAM 1 MG: 1 INJECTION INTRAMUSCULAR; INTRAVENOUS at 11:19

## 2022-03-21 RX ADMIN — Medication 100 MCG: at 13:01

## 2022-03-21 RX ADMIN — SODIUM CHLORIDE: 9 INJECTION, SOLUTION INTRAVENOUS at 11:14

## 2022-03-21 RX ADMIN — Medication 100 MCG: at 13:21

## 2022-03-21 RX ADMIN — MEPERIDINE HYDROCHLORIDE 12.5 MG: 50 INJECTION INTRAMUSCULAR; INTRAVENOUS; SUBCUTANEOUS at 14:44

## 2022-03-21 RX ADMIN — SODIUM CHLORIDE: 9 INJECTION, SOLUTION INTRAVENOUS at 12:29

## 2022-03-21 RX ADMIN — FENTANYL CITRATE 50 MCG: 50 INJECTION INTRAMUSCULAR; INTRAVENOUS at 11:20

## 2022-03-21 RX ADMIN — CEFAZOLIN 2000 MG: 10 INJECTION, POWDER, FOR SOLUTION INTRAVENOUS at 12:30

## 2022-03-21 RX ADMIN — SODIUM CHLORIDE: 9 INJECTION, SOLUTION INTRAVENOUS at 10:42

## 2022-03-21 RX ADMIN — MIDAZOLAM 1 MG: 1 INJECTION INTRAMUSCULAR; INTRAVENOUS at 11:20

## 2022-03-21 ASSESSMENT — PULMONARY FUNCTION TESTS
PIF_VALUE: 23
PIF_VALUE: 23
PIF_VALUE: 21
PIF_VALUE: 20
PIF_VALUE: 23
PIF_VALUE: 24
PIF_VALUE: 23
PIF_VALUE: 23
PIF_VALUE: 3
PIF_VALUE: 23
PIF_VALUE: 23
PIF_VALUE: 24
PIF_VALUE: 22
PIF_VALUE: 20
PIF_VALUE: 2
PIF_VALUE: 20
PIF_VALUE: 24
PIF_VALUE: 23
PIF_VALUE: 1
PIF_VALUE: 23
PIF_VALUE: 24
PIF_VALUE: 18
PIF_VALUE: 23
PIF_VALUE: 24
PIF_VALUE: 23
PIF_VALUE: 24
PIF_VALUE: 4
PIF_VALUE: 24
PIF_VALUE: 24
PIF_VALUE: 22
PIF_VALUE: 24
PIF_VALUE: 23
PIF_VALUE: 24
PIF_VALUE: 10
PIF_VALUE: 2
PIF_VALUE: 5
PIF_VALUE: 24
PIF_VALUE: 3
PIF_VALUE: 23
PIF_VALUE: 26
PIF_VALUE: 23
PIF_VALUE: 23
PIF_VALUE: 21
PIF_VALUE: 22
PIF_VALUE: 17
PIF_VALUE: 13
PIF_VALUE: 23
PIF_VALUE: 23
PIF_VALUE: 24
PIF_VALUE: 23
PIF_VALUE: 23
PIF_VALUE: 24
PIF_VALUE: 23
PIF_VALUE: 24
PIF_VALUE: 23
PIF_VALUE: 2
PIF_VALUE: 23
PIF_VALUE: 0
PIF_VALUE: 23
PIF_VALUE: 24
PIF_VALUE: 24
PIF_VALUE: 12
PIF_VALUE: 24
PIF_VALUE: 21
PIF_VALUE: 17
PIF_VALUE: 29
PIF_VALUE: 23
PIF_VALUE: 17
PIF_VALUE: 21
PIF_VALUE: 23
PIF_VALUE: 24
PIF_VALUE: 23
PIF_VALUE: 2
PIF_VALUE: 23
PIF_VALUE: 18
PIF_VALUE: 23
PIF_VALUE: 20
PIF_VALUE: 23
PIF_VALUE: 17
PIF_VALUE: 1
PIF_VALUE: 24
PIF_VALUE: 23
PIF_VALUE: 24
PIF_VALUE: 22
PIF_VALUE: 27
PIF_VALUE: 24
PIF_VALUE: 23
PIF_VALUE: 23
PIF_VALUE: 2
PIF_VALUE: 22
PIF_VALUE: 23
PIF_VALUE: 22
PIF_VALUE: 23
PIF_VALUE: 13

## 2022-03-21 ASSESSMENT — PAIN - FUNCTIONAL ASSESSMENT: PAIN_FUNCTIONAL_ASSESSMENT: 0-10

## 2022-03-21 ASSESSMENT — PAIN SCALES - GENERAL: PAINLEVEL_OUTOF10: 0

## 2022-03-21 NOTE — H&P
Date of Surgery Update:  Alex Palumbo was seen, history and physical examination reviewed, and patient examined by me today. There have been no significant clinical changes since the completion of the previous history and physical.    The risk, benefits, and alternatives of the proposed procedure have been explained to the patient (or appropriate guardian) and understanding verbalized. All questions answered. Patient wishes to proceed.     Electronically signed by: Noemi Jeff MD,3/21/2022,12:28 PM

## 2022-03-21 NOTE — PROGRESS NOTES
Pt arrived from OR to PACU bay 8. Report received from OR staff. Pt arousable to voice. Surgical incisions dressings in place to LUE. Pt on 6L simple mask, NSR, and VSS. Will continue to monitor.

## 2022-03-21 NOTE — ANESTHESIA PRE PROCEDURE
Department of Anesthesiology  Preprocedure Note       Name:  Alex Palumbo   Age:  51 y.o.  :  1975                                          MRN:  [id number]         Date:  3/21/2022      Surgeon: Elijah Aguillon):  Noemi Jeff MD    Procedure: Procedure(s):  LEFT SHOULDER ARTHROSCOPY WITH ROTATOR CUFF REPAIR, SUBACROMIAL DECOMPRESSION, DISTAL CLAVICLE EXCISION (86300, [id number], [id number]    Medications prior to admission:   Prior to Admission medications    Medication Sig Start Date End Date Taking? Authorizing Provider   cyclobenzaprine (FLEXERIL) 10 MG tablet Take 10 mg by mouth 3 times daily as needed for Muscle spasms    Historical Provider, MD   glipiZIDE (GLUCOTROL XL) 5 MG extended release tablet Take 5 mg by mouth daily    Historical Provider, MD   atorvastatin (LIPITOR) 40 MG tablet Take 40 mg by mouth daily    Historical Provider, MD   insulin glargine (LANTUS SOLOSTAR) 100 UNIT/ML injection pen Inject 44 Units into the skin nightly    Historical Provider, MD   meloxicam (MOBIC) 15 MG tablet Take 1 tablet by mouth daily 3/31/20   Noemi Jeff MD   hydrochlorothiazide (HYDRODIURIL) 25 MG tablet Take 25 mg by mouth daily    Historical Provider, MD   amLODIPine (NORVASC) 5 MG tablet Take 5 mg by mouth daily    Historical Provider, MD   insulin aspart (NOVOLOG) 100 UNIT/ML injection vial Inject 15 Units into the skin 3 times daily (before meals) 3/17/16   Brenda Cruz MD   fluconazole (DIFLUCAN) 150 MG tablet Take 150 mg by mouth every 14 days  12/13/15   Historical Provider, MD   aspirin 81 MG EC tablet Take 81 mg by mouth daily     Historical Provider, MD   FreeStyle Lancets MISC by Does not apply route. 13   Jessie Hickman PA-C   glucose blood VI test strips (EXACTECH TEST) strip As needed. 13   Jessie Hickman PA-C       Current medications:    No current facility-administered medications for this visit. No current outpatient medications on file.      Facility-Administered Medications Ordered in Other Visits   Medication Dose Route Frequency Provider Last Rate Last Admin    0.9 % sodium chloride infusion   IntraVENous Continuous Carlie Vedrin MD   New Bag at 22 1042    lidocaine PF 1 % injection 0.5 mL  0.5 mL IntraDERmal Once Carlie Verdin MD        ceFAZolin (ANCEF) 2000 mg in dextrose 5 % 50 mL IVPB  2,000 mg IntraVENous Once Carlie Verdin MD           Allergies: Allergies   Allergen Reactions    Bee Venom     Penicillins Hives    Adhesive Tape Rash       Problem List:    Patient Active Problem List   Diagnosis Code    Dextrocardia Q24.0    Diabetes type 2, uncontrolled ([facility] [address] E11.65    Non compliance w medication regimen Z91.14    Chest pain R07.9    Obesity (BMI 30-39. 9) E66.9    Hyperglycemia due to type 2 diabetes mellitus ([facility] [address] E11.65    Tear of medial meniscus of right knee, current S83.241A    Sprain of metacarpophalangeal joint of left thumb I71.966S       Past Medical History:        Diagnosis Date    Arthritis     Bacterial vaginal infection     Dextrocardia     Diabetes type 2, uncontrolled ([facility] [address]     Hyperlipidemia     Hypertension     Inversus, situs     Non compliance w medication regimen     doesn't like to take medicine    Obesity (BMI 30-39. 9)        Past Surgical History:        Procedure Laterality Date     SECTION      X2    ENDOSCOPY, COLON, DIAGNOSTIC      ESOPHAGUS SURGERY      torn esophagus    KNEE ARTHROSCOPY Left 13    PARTIAL MEDIAL MENISECITMY, SYNOVECTOMY    KNEE CARTILAGE SURGERY Right 2017    Meniscus Repair    SHOULDER ARTHROSCOPY Right 2020    RIGHT SHOULDER ARTHROSCOPY WITH ROTATOR CUFF REPAIR WITH AUGMENTATION, BICEP TENODESIS performed by Carlie Verdin MD at  [facility]         Social History:    Social History     Tobacco Use    Smoking status: Never Smoker    Smokeless tobacco: Never Used   Substance Use Topics    Alcohol use:  Yes Alcohol/week: 0.0 standard drinks     Comment: occassionally                                Counseling given: Not Answered      Vital Signs (Current): There were no vitals filed for this visit. BP Readings from Last 3 Encounters:   05/22/21 (!) 143/78   09/17/20 (!) 154/90   09/17/20 (!) 264/131       NPO Status:                                                                                 BMI:   Wt Readings from Last 3 Encounters:   03/16/22 178 lb (80.7 kg)   06/02/21 171 lb (77.6 kg)   05/22/21 171 lb (77.6 kg)     There is no height or weight on file to calculate BMI.    CBC:   Lab Results   Component Value Date    WBC 5.7 09/17/2020    RBC 5.08 09/17/2020    HGB 15.5 09/17/2020    HCT 44.3 09/17/2020    MCV 87.3 09/17/2020    RDW 12.8 09/17/2020     09/17/2020       CMP:   Lab Results   Component Value Date     09/17/2020    K 3.0 09/17/2020    CL 95 09/17/2020    CO2 26 09/17/2020    BUN 10 09/17/2020    CREATININE 0.6 09/17/2020    GFRAA >60 09/17/2020    GFRAA >60 09/12/2012    AGRATIO 1.6 08/07/2018    LABGLOM >60 09/17/2020    GLUCOSE 290 09/17/2020    PROT 6.8 08/07/2018    PROT 7.7 10/22/2011    CALCIUM 9.8 09/17/2020    BILITOT 0.3 08/07/2018    ALKPHOS 58 08/07/2018    AST 15 08/07/2018    ALT 16 08/07/2018       POC Tests: No results for input(s): POCGLU, POCNA, POCK, POCCL, POCBUN, POCHEMO, POCHCT in the last 72 hours.     Coags:   Lab Results   Component Value Date    PROTIME 9.2 10/10/2014    INR 0.86 10/10/2014    APTT 27.7 10/10/2014       HCG (If Applicable):   Lab Results   Component Value Date    PREGTESTUR Negative 09/17/2020        ABGs: No results found for: PHART, PO2ART, HXA0LOY, VOL3OUO, BEART, M1YCFTFR     Type & Screen (If Applicable):  Lab Results   Component Value Date    LABABO O 09/01/2011    LABRH Positive 09/01/2011       Drug/Infectious Status (If Applicable):  No results found for: HIV, HEPCAB    COVID-19 Screening (If Applicable):   Lab Results   Component Value Date    COVID19 NOT DETECTED 09/11/2020         Anesthesia Evaluation  Patient summary reviewed and Nursing notes reviewed no history of anesthetic complications:   Airway: Mallampati: II  TM distance: >3 FB   Neck ROM: full  Mouth opening: > = 3 FB Dental: normal exam         Pulmonary:Negative Pulmonary ROS breath sounds clear to auscultation                             Cardiovascular:  Exercise tolerance: good (>4 METS),   (+) hypertension: moderate,     (-) CABG/stent, dysrhythmias and  angina      Rhythm: regular  Rate: normal                 ROS comment: Dextrocardia  6/2020 nrl stress NM echo     Neuro/Psych:      (-) seizures, TIA and CVA           GI/Hepatic/Renal:        (-) GERD       Endo/Other:    (+) DiabetesType II DM, well controlled, , .                 Abdominal:   (+) obese,           Vascular: Other Findings:               Anesthesia Plan      general and regional     ASA 2     (Risks of regional anesthesia discussed with patient include infection, hematoma, intravascular injection, temporary or permanent nerve damage. Patient verbalizes understanding and her wishes to proceed.)  Induction: intravenous. MIPS: Postoperative opioids intended, Prophylactic antiemetics administered and Postoperative trial extubation. Anesthetic plan and risks discussed with patient. Use of blood products discussed with patient whom consented to blood products. Plan discussed with CRNA.                   Elias Trevino MD   3/21/2022

## 2022-03-21 NOTE — ANESTHESIA POSTPROCEDURE EVALUATION
Department of Anesthesiology  Postprocedure Note    Patient: Shante Garrido  MRN: [id number]  [place]: 1975  Date of evaluation: 3/21/2022  Time:  11:28 AM     Procedure Summary     Date: 03/21/22 Room / Location: [address]    Anesthesia Start:  Anesthesia Stop:     Procedure: LEFT SHOULDER ARTHROSCOPY WITH ROTATOR CUFF REPAIR, SUBACROMIAL DECOMPRESSION, DISTAL CLAVICLE EXCISION ([id number], [id number], [id number] (Left Shoulder) Diagnosis: (M75.102  ROTATOR CUFF RUPTURE)    Surgeons: Jim Tillman MD Responsible Provider:     Anesthesia Type: general, regional ASA Status: 2          Anesthesia Type: No value filed. Gloria Phase I:      Gloria Phase II:      Last vitals: Reviewed and per EMR flowsheets.        Anesthesia Post Evaluation    Patient location during evaluation: PACU  Patient participation: complete - patient participated  Level of consciousness: awake  Airway patency: patent  Nausea & Vomiting: no vomiting and no nausea  Complications: no  Cardiovascular status: hemodynamically stable  Respiratory status: acceptable  Hydration status: stable  Multimodal analgesia pain management approach

## 2022-03-21 NOTE — PROGRESS NOTES
Discharge instructions review with patient and Aron (S/O). All home medications have been reviewed, pt v/u. Discharge instructions signed. Pt discharged via wheelchair. Pt discharged with 2 RX, shoulder immobilizer and all belongings. Aron taking stable pt home.

## 2022-03-21 NOTE — ANESTHESIA PROCEDURE NOTES
Peripheral Block    Patient location during procedure: pre-op  Staffing  Performed: anesthesiologist   Preanesthetic Checklist  Completed: patient identified, IV checked, site marked, risks and benefits discussed, surgical consent, monitors and equipment checked, pre-op evaluation, timeout performed, anesthesia consent given, oxygen available and patient being monitored  Peripheral Block  Patient position: sitting  Prep: ChloraPrep  Patient monitoring: cardiac monitor, continuous pulse ox, frequent blood pressure checks and IV access  Block type: Brachial plexus  Laterality: left  Injection technique: single-shot  Guidance: ultrasound guided  Local infiltration: lidocaine  Infiltration strength: 1 %  Dose: 3 mL  Interscalene  Provider prep: mask and sterile gloves  Local infiltration: lidocaine  Needle  Needle gauge: 21 G  Needle length: 10 cm  Needle localization: ultrasound guidance  Assessment  Injection assessment: negative aspiration for heme, no paresthesia on injection and local visualized surrounding nerve on ultrasound  Paresthesia pain: none  Slow fractionated injection: yes  Hemodynamics: stable  Additional Notes  U/S 36066.  (1) Under ultrasound guidance, a gauge needle was inserted and placed in close proximity to the nerve.  (2) Ultrasound was also used to visualize the spread of the anesthetic in close proximity to the nerve being blocked. (3) The nerve appeared anatomically normal, and (4 there were no apparent abnormal pathological findings on the image that were readily visible and related to the nerve being blocked. (5) A permanent ultrasound image was saved in the patient's record.             Reason for block: post-op pain management and at surgeon's request

## 2022-03-22 NOTE — OP NOTE
[facility]                     [facility], [address]                                OPERATIVE REPORT    PATIENT NAME: Rosalva Robert                   :        1975  MED REC NO:   [id number]                          ROOM:  ACCOUNT NO:   [de-identified]                           ADMIT DATE: 2022  PROVIDER:     Sohail Rayo MD    DATE OF PROCEDURE:  2022    PREOPERATIVE DIAGNOSES:  1. Left shoulder rotator cuff tear. 2.  Left shoulder outlet impingement. 3.  Left shoulder distal clavicle arthritis. POSTOPERATIVE DIAGNOSES:  1. Left shoulder rotator cuff tear. 2.  Left shoulder outlet impingement. 3.  Left shoulder distal clavicle arthritis. OPERATION PERFORMED:  Left shoulder arthroscopy with:  1. Arthroscopic rotator cuff repair. 2.  Arthroscopic subacromial decompression. 3.  Arthroscopic distal clavicle excision. 4.  Arthroscopic augmentation of the rotator cuff repair with a  bio-inductive scaffold. PRIMARY SURGEON:  Sohail Rayo MD    ANESTHESIA:  General endotracheal.  The patient also did receive an  interscalene block. IMPLANTS:  One Brittney Lydia And Nephew size 6.15 Regenesorb Healicoil suture  anchor for medial row fixation, two Smith and Nephew size 5.5 mm  MULTIFIX-S suture anchors for lateral row fixation and one Smith and  Nephew size medium Regeneten bio-inductive scaffold. BLOOD LOSS:  50 mL. CONDITION:  The patient postoperatively was stable. HISTORY:  The patient is a 50-year-old female with a history of left  shoulder pain and weakness that was new in onset with an MRI that did  demonstrate a full-thickness rotator cuff tear. We discussed treatment  options. I did recommend shoulder arthroscopy with rotator cuff repair. After discussing the risks and benefits of the procedure with her,  informed consent was obtained.     OPERATIVE PROCEDURE:  The patient was seen in the preoperative holding  area. The left shoulder was confirmed to be the operative extremity and  it was marked at that period of time. She did receive 2 gm of Ancef  preoperatively. She was then brought back to the operating room in  supine position. General endotracheal anesthesia was started by the  Anesthesia Service. She was then positioned into the beach-chair  position with all bony prominences padded. The left shoulder at that  time was examined under anesthesia and found to have full passive range  of motion. Left shoulder was then injected through a posterior approach  into the glenohumeral joint with a total of 60 mL of local anesthetic,  which was a 1:1 mixture of 0.5% Marcaine with epinephrine and 1%  lidocaine. Left shoulder at that time was prepped and draped in the  standard sterile fashion. A standard posterior mid glenoid portal was established at that time by  incising the skin with a #11 blade. Blunt trocar was then used to  insert a cannula into the shoulder joint. Spinal needle localization  was used to establish an anterior portal.  An #11 blade was used to  incise the skin anteriorly. A nerve hook was inserted in the joint and  diagnostic arthroscopy was performed with the following findings:  1. In the glenohumeral joint, the articular surfaces of both the  glenoid and the humeral head were found to be intact. 2.  The anterior and superior labrums demonstrated some degenerative  fraying with no actual labral osseous detachment. 3.  Subscapularis was intact at its insertion along with bicipital  sling. 4.  Biceps tendon was delivered into the joint and was found to have  some fraying, but no tenosynovitis or tearing. 5.  There was found to be a full-thickness tear of the supraspinatus at  its footprint. The infraspinatus was found to be intact.     Moon Situ was inserted through the anterior portal and debridement along  the supraspinatus footprint was done at that point in time as well as  debridement along the anterior and superior labrums was done at that  time to get it down to a stable rim. We now readjusted our posterior  cannula in the subacromial space with a blunt trocar. Through spinal  needle localization, we created a lateral portal.  An 11-blade was used  to incise the skin laterally. Dulce Aki was inserted through the lateral  portal and bursectomy was performed. The patient had an anterior  lateral spur. A 5.5 bur was then used to perform subacromial  decompression. Shoulder could now be taken through range of motion  without impingement of the rotator cuff tissue on the undersurface of  the acromion. We now fully exposed the distal clavicle. There was  significant distal clavicle arthrosis. The 5.5 bur was brought in  through the anterior portal and the distal centimeter of the clavicle  was resected at that point in time. We now had excellent visualization  of the rotator cuff tear. It was a full-thickness tear that was about a  centimeter and half in size. We used a shaver to clean up the rotator  cuff footprint so we had a good bony surface for repair of our rotator  cuff. We placed a single 3.18 Regenesorb Healicoil suture anchor along  the medial footprint of the rotator cuff. We passed our sutures through  the rotator cuff tissue in a horizontal mattress fashion. We tied  arthroscopic knots which consisted of a Estrada loop followed by four  alternating half hitches which gave us excellent reapproximation of the  cuff tissue back down to the footprint. We then incorporated two  sutures a piece into our lateral row anchors, one was placed anterior  laterally, one posterior laterally and those were 5.5 MULTIFIX-S suture  anchors. Once we placed those, those gave us excellent compression of  the rotator cuff tissue back down to the lateral row of the rotator  cuff.   We then inserted a size medium bio-inductive scaffold in through  the lateral portal.  We centered it directly over our rotator cuff  repair. We fixed that medially and through the mid portions of it with  tendon staples, laterally with bone staples. Once it was completely  fixed, the shoulder was taken through range of motion and demonstrated  excellent stability. Once that was completed, the shoulder was drained. All surgical instruments were removed from the shoulder. Portal closure  was done with 3-0 nylon. Sterile dressing was placed over the shoulder. Shoulder was placed into a sling. The patient at that time was awakened  from anesthesia and transferred to PACU in stable condition.         Eve Ram MD    D: 03/21/2022 14:26:10       T: 03/22/2022 1:53:08     [email or web address]  Job#: [id number]     Doc#: [id number]    CC:

## 2022-11-03 NOTE — ED PROVIDER NOTES
Left msg on v/m for patient to call back.    1\" (1.549 m), weight 181 lb 9.6 oz (82.4 kg), SpO2 99 %, not currently breastfeeding. For further details of [facility] emergency department encounter, please see documentation by advanced practice provider, Liya.         Briana Lima MD  06/22/20 [id number]       Briana Lima MD  06/22/20 9622

## 2023-08-14 ENCOUNTER — APPOINTMENT (OUTPATIENT)
Dept: GENERAL RADIOLOGY | Age: 48
End: 2023-08-14
Payer: COMMERCIAL

## 2023-08-14 ENCOUNTER — HOSPITAL ENCOUNTER (EMERGENCY)
Age: 48
Discharge: HOME | End: 2023-08-15
Attending: EMERGENCY MEDICINE
Payer: COMMERCIAL

## 2023-08-14 ENCOUNTER — APPOINTMENT (OUTPATIENT)
Dept: CT IMAGING | Age: 48
End: 2023-08-14
Payer: COMMERCIAL

## 2023-08-14 DIAGNOSIS — K52.9 COLITIS: ICD-10-CM

## 2023-08-14 DIAGNOSIS — R10.84 GENERALIZED ABDOMINAL PAIN: Primary | ICD-10-CM

## 2023-08-14 LAB
ALBUMIN SERPL-MCNC: 4.3 G/DL (ref 3.4–5)
ALBUMIN/GLOB SERPL: 1.7 {RATIO} (ref 1.1–2.2)
ALP SERPL-CCNC: 52 U/L (ref 40–129)
ALT SERPL-CCNC: 33 U/L (ref 10–40)
ANION GAP SERPL CALCULATED.3IONS-SCNC: 11 MMOL/L (ref 3–16)
AST SERPL-CCNC: 20 U/L (ref 15–37)
BASOPHILS # BLD: 0 K/UL (ref 0–0.2)
BASOPHILS NFR BLD: 0 %
BILIRUB SERPL-MCNC: 0.4 MG/DL (ref 0–1)
BUN SERPL-MCNC: 10 MG/DL (ref 7–20)
CALCIUM SERPL-MCNC: 9.2 MG/DL (ref 8.3–10.6)
CHLORIDE SERPL-SCNC: 102 MMOL/L (ref 99–110)
CO2 SERPL-SCNC: 25 MMOL/L (ref 21–32)
CREAT SERPL-MCNC: <0.5 MG/DL (ref 0.6–1.1)
DEPRECATED RDW RBC AUTO: 14.6 % (ref 12.4–15.4)
EOSINOPHIL # BLD: 0.2 K/UL (ref 0–0.6)
EOSINOPHIL NFR BLD: 2 %
FLUAV RNA UPPER RESP QL NAA+PROBE: NEGATIVE
FLUBV AG NPH QL: NEGATIVE
GFR SERPLBLD CREATININE-BSD FMLA CKD-EPI: >60 ML/MIN/{1.73_M2}
GLUCOSE SERPL-MCNC: 167 MG/DL (ref 70–99)
HCG SERPL QL: NEGATIVE
HCT VFR BLD AUTO: 42.2 % (ref 36–48)
HGB BLD-MCNC: 14.2 G/DL (ref 12–16)
LIPASE SERPL-CCNC: 55 U/L (ref 13–60)
LYMPHOCYTES # BLD: 1.5 K/UL (ref 1–5.1)
LYMPHOCYTES NFR BLD: 16 %
MAGNESIUM SERPL-MCNC: 2.1 MG/DL (ref 1.8–2.4)
MCH RBC QN AUTO: 30.8 PG (ref 26–34)
MCHC RBC AUTO-ENTMCNC: 33.8 G/DL (ref 31–36)
MCV RBC AUTO: 91.2 FL (ref 80–100)
MONOCYTES # BLD: 0.1 K/UL (ref 0–1.3)
MONOCYTES NFR BLD: 1 %
NEUTROPHILS # BLD: 7.8 K/UL (ref 1.7–7.7)
NEUTROPHILS NFR BLD: 81 %
NT-PROBNP SERPL-MCNC: 39 PG/ML (ref 0–124)
PLATELET # BLD AUTO: 264 K/UL (ref 135–450)
PLATELET BLD QL SMEAR: ADEQUATE
PMV BLD AUTO: 8.1 FL (ref 5–10.5)
POTASSIUM SERPL-SCNC: 2.9 MMOL/L (ref 3.5–5.1)
PROT SERPL-MCNC: 6.8 G/DL (ref 6.4–8.2)
RBC # BLD AUTO: 4.63 M/UL (ref 4–5.2)
RBC MORPH BLD: NORMAL
REASON FOR REJECTION: NORMAL
REJECTED TEST: NORMAL
SARS-COV-2 RDRP RESP QL NAA+PROBE: NOT DETECTED
SLIDE REVIEW: ABNORMAL
SODIUM SERPL-SCNC: 138 MMOL/L (ref 136–145)
TROPONIN, HIGH SENSITIVITY: 8 NG/L (ref 0–14)
WBC # BLD AUTO: 9.6 K/UL (ref 4–11)

## 2023-08-14 PROCEDURE — 83690 ASSAY OF LIPASE: CPT

## 2023-08-14 PROCEDURE — 84703 CHORIONIC GONADOTROPIN ASSAY: CPT

## 2023-08-14 PROCEDURE — 99285 EMERGENCY DEPT VISIT HI MDM: CPT

## 2023-08-14 PROCEDURE — 87804 INFLUENZA ASSAY W/OPTIC: CPT

## 2023-08-14 PROCEDURE — 84484 ASSAY OF TROPONIN QUANT: CPT

## 2023-08-14 PROCEDURE — 83880 ASSAY OF NATRIURETIC PEPTIDE: CPT

## 2023-08-14 PROCEDURE — 85025 COMPLETE CBC W/AUTO DIFF WBC: CPT

## 2023-08-14 PROCEDURE — 83735 ASSAY OF MAGNESIUM: CPT

## 2023-08-14 PROCEDURE — 6360000004 HC RX CONTRAST MEDICATION: Performed by: PHYSICIAN ASSISTANT

## 2023-08-14 PROCEDURE — 80053 COMPREHEN METABOLIC PANEL: CPT

## 2023-08-14 PROCEDURE — 6370000000 HC RX 637 (ALT 250 FOR IP): Performed by: PHYSICIAN ASSISTANT

## 2023-08-14 PROCEDURE — 74177 CT ABD & PELVIS W/CONTRAST: CPT

## 2023-08-14 PROCEDURE — 87635 SARS-COV-2 COVID-19 AMP PRB: CPT

## 2023-08-14 PROCEDURE — 93005 ELECTROCARDIOGRAM TRACING: CPT | Performed by: EMERGENCY MEDICINE

## 2023-08-14 PROCEDURE — 6360000002 HC RX W HCPCS: Performed by: PHYSICIAN ASSISTANT

## 2023-08-14 PROCEDURE — 96374 THER/PROPH/DIAG INJ IV PUSH: CPT

## 2023-08-14 PROCEDURE — 71045 X-RAY EXAM CHEST 1 VIEW: CPT

## 2023-08-14 RX ORDER — POTASSIUM CHLORIDE 7.45 MG/ML
10 INJECTION INTRAVENOUS
Status: DISCONTINUED | OUTPATIENT
Start: 2023-08-15 | End: 2023-08-15 | Stop reason: HOSPADM

## 2023-08-14 RX ORDER — ONDANSETRON 4 MG/1
4 TABLET, ORALLY DISINTEGRATING ORAL ONCE
Status: DISCONTINUED | OUTPATIENT
Start: 2023-08-14 | End: 2023-08-14

## 2023-08-14 RX ORDER — TIRZEPATIDE 2.5 MG/.5ML
2.5 INJECTION, SOLUTION SUBCUTANEOUS WEEKLY
COMMUNITY

## 2023-08-14 RX ORDER — ONDANSETRON 2 MG/ML
4 INJECTION INTRAMUSCULAR; INTRAVENOUS ONCE
Status: COMPLETED | OUTPATIENT
Start: 2023-08-14 | End: 2023-08-14

## 2023-08-14 RX ORDER — OLMESARTAN MEDOXOMIL AND HYDROCHLOROTHIAZIDE 40/25 40; 25 MG/1; MG/1
1 TABLET ORAL DAILY
COMMUNITY

## 2023-08-14 RX ADMIN — ONDANSETRON 4 MG: 2 INJECTION INTRAMUSCULAR; INTRAVENOUS at 21:23

## 2023-08-14 RX ADMIN — IOPAMIDOL 75 ML: 755 INJECTION, SOLUTION INTRAVENOUS at 23:54

## 2023-08-14 ASSESSMENT — ENCOUNTER SYMPTOMS
NAUSEA: 1
CONSTIPATION: 0
COUGH: 0
ABDOMINAL PAIN: 1
RECTAL PAIN: 0
BACK PAIN: 0
VOMITING: 1
ANAL BLEEDING: 0
ABDOMINAL DISTENTION: 0
RESPIRATORY NEGATIVE: 1
BLOOD IN STOOL: 0
CHEST TIGHTNESS: 0
COLOR CHANGE: 0
DIARRHEA: 1
SHORTNESS OF BREATH: 0
PHOTOPHOBIA: 0

## 2023-08-15 VITALS
SYSTOLIC BLOOD PRESSURE: 113 MMHG | HEART RATE: 67 BPM | OXYGEN SATURATION: 100 % | TEMPERATURE: 98.3 F | RESPIRATION RATE: 5 BRPM | DIASTOLIC BLOOD PRESSURE: 75 MMHG

## 2023-08-15 LAB
EKG ATRIAL RATE: 68 BPM
EKG DIAGNOSIS: NORMAL
EKG P AXIS: 38 DEGREES
EKG P-R INTERVAL: 182 MS
EKG Q-T INTERVAL: 428 MS
EKG QRS DURATION: 82 MS
EKG QTC CALCULATION (BAZETT): 455 MS
EKG R AXIS: 8 DEGREES
EKG T AXIS: 77 DEGREES
EKG VENTRICULAR RATE: 68 BPM

## 2023-08-15 PROCEDURE — 93010 ELECTROCARDIOGRAM REPORT: CPT | Performed by: INTERNAL MEDICINE

## 2023-08-15 PROCEDURE — 6370000000 HC RX 637 (ALT 250 FOR IP): Performed by: EMERGENCY MEDICINE

## 2023-08-15 PROCEDURE — 6360000002 HC RX W HCPCS: Performed by: PHYSICIAN ASSISTANT

## 2023-08-15 PROCEDURE — 6370000000 HC RX 637 (ALT 250 FOR IP): Performed by: PHYSICIAN ASSISTANT

## 2023-08-15 RX ORDER — DICYCLOMINE HYDROCHLORIDE 10 MG/1
10 CAPSULE ORAL ONCE
Status: COMPLETED | OUTPATIENT
Start: 2023-08-15 | End: 2023-08-15

## 2023-08-15 RX ORDER — ONDANSETRON 4 MG/1
4 TABLET, FILM COATED ORAL EVERY 8 HOURS PRN
Qty: 20 TABLET | Refills: 0 | Status: SHIPPED | OUTPATIENT
Start: 2023-08-15

## 2023-08-15 RX ORDER — CIPROFLOXACIN 500 MG/1
500 TABLET, FILM COATED ORAL 2 TIMES DAILY
Qty: 14 TABLET | Refills: 0 | Status: SHIPPED | OUTPATIENT
Start: 2023-08-15 | End: 2023-08-22

## 2023-08-15 RX ORDER — METRONIDAZOLE 250 MG/1
500 TABLET ORAL ONCE
Status: COMPLETED | OUTPATIENT
Start: 2023-08-15 | End: 2023-08-15

## 2023-08-15 RX ORDER — METRONIDAZOLE 500 MG/1
500 TABLET ORAL 2 TIMES DAILY
Qty: 14 TABLET | Refills: 0 | Status: SHIPPED | OUTPATIENT
Start: 2023-08-15 | End: 2023-08-22

## 2023-08-15 RX ORDER — DICYCLOMINE HYDROCHLORIDE 10 MG/1
10 CAPSULE ORAL 4 TIMES DAILY
Qty: 360 CAPSULE | Refills: 1 | Status: SHIPPED | OUTPATIENT
Start: 2023-08-15

## 2023-08-15 RX ORDER — CIPROFLOXACIN 500 MG/1
500 TABLET, FILM COATED ORAL ONCE
Status: COMPLETED | OUTPATIENT
Start: 2023-08-15 | End: 2023-08-15

## 2023-08-15 RX ORDER — POTASSIUM CHLORIDE 750 MG/1
10 TABLET, EXTENDED RELEASE ORAL 2 TIMES DAILY
Qty: 180 TABLET | Refills: 1 | Status: SHIPPED | OUTPATIENT
Start: 2023-08-15

## 2023-08-15 RX ADMIN — POTASSIUM CHLORIDE 10 MEQ: 7.46 INJECTION, SOLUTION INTRAVENOUS at 00:23

## 2023-08-15 RX ADMIN — METRONIDAZOLE 500 MG: 250 TABLET ORAL at 01:34

## 2023-08-15 RX ADMIN — DICYCLOMINE HYDROCHLORIDE 10 MG: 10 CAPSULE ORAL at 01:34

## 2023-08-15 RX ADMIN — CIPROFLOXACIN 500 MG: 500 TABLET, FILM COATED ORAL at 01:34

## 2023-08-15 RX ADMIN — POTASSIUM BICARBONATE 40 MEQ: 782 TABLET, EFFERVESCENT ORAL at 00:40

## 2023-08-15 NOTE — ED NOTES
Discharge instructions given, patient acknowledged understanding, patient ambulated out of ed upon discharge      Theresa Luna RN  08/15/23 0144

## 2023-08-15 NOTE — ED PROVIDER NOTES
[facility]        Pt Name: Reji Bliss  MRN: [id number]  [address] [facility] [place] 1975  Date of evaluation: 8/14/2023  Provider: MICHEL Prado  PCP: Fadumo Dover MD  Note Started: 9:03 PM EDT 8/14/23       I have seen and evaluated this patient with my supervising physician [phone]       Chief Complaint   Patient presents with    Abdominal Pain     Pt brought in by EMS, pt states abdominal pain, lightheaded, nausea, sweating, diarrhea, and weakness around 1900. Pt states that she has these symptoms when sugar is low.  in squad. 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 HISTORY OF PRESENT ILLNESS: 1 or more Elements     History From: Patient  Limitations to history : None    Moon Verma is a 51 y.o. female With past medical history of dextrocardia, diabetes, obesity who presents ED with complaint of abdominal pain and nausea/vomiting. Patient arrives by EMS with complaint of upper abdominal pain and associated nausea/vomiting. Patient states symptoms began earlier today. She states he had similar symptoms 3 weeks ago secondary to hypoglycemia. Her blood sugar was 197 in route and she reports is not hypoglycemic at home. She is unsure what is causing her symptoms. She states she did have some lightheadedness where she felt like she was going to pass out. This lasted for about 10 minutes and subsided. She denies any further lightheadedness or dizziness. Denies any chest pain or shortness of breath. Denies pleuritic pain, cough or hemoptysis. Denies fever or chills. Denies any rashes or lesions. Reports continued discomfort to the upper abdomen with associated nausea. Did have episode of diarrhea here in the emergency department and is concerned that she feels like she has to go again. She denies any sick contacts or recent travel. Denies any rashes or lesions.   Reports past surgical history of

## 2023-08-22 NOTE — PROGRESS NOTES
[facility] ENDOSCOPY COLONOSCOPY PRE-OPERATIVE INSTRUCTIONS    Procedure date__08/24/2023_______Arrival time__1300__________        Surgery time__1400__________       Clear liquids the day before the procedure. Do not eat or drink anything within 5 hours of your procedure. This includes water chewing gum, mints and ice chips. You may brush your teeth and gargle the morning of your surgery, but do not swallow the water    You may be asked to stop blood thinners such as Coumadin, Plavix, Fragmin, Lovenox, etc., or any anti-inflammatories such as:  Aspirin, Ibuprofen, Advil, Naproxen prior to your procedure. We also ask that you stop any OTC medications such as fish oil, vitamin E, glucosamine, garlic, Multivitamins, COQ 10, etc.    You must make arrangements for a responsible adult to arrive with you and stay in our waiting area during your procedure. They will also need to take you home after your procedure. For your safety you will not be allowed to leave alone or drive yourself home. Also for your safety, it is strongly suggested that someone stay with you the first 24 hours after your procedure. For your comfort, please wear simple loose fitting clothing to the center. Please do not bring valuables. If you have a living will and a durable power of  for healthcare, please bring in a copy.      You will need to bring a photo ID and insurance card    Our goal is to provide you with excellent care so if you have any questions, please contact us at the [facility] at [phone]         Please note these are generalized instructions for all colonoscopy cases, you may be provided with more specific instructions if necessary

## 2023-08-23 ENCOUNTER — ANESTHESIA EVENT (OUTPATIENT)
Dept: ENDOSCOPY | Age: 48
End: 2023-08-23
Payer: COMMERCIAL

## 2023-08-24 ENCOUNTER — ANESTHESIA (OUTPATIENT)
Dept: ENDOSCOPY | Age: 48
End: 2023-08-24
Payer: COMMERCIAL

## 2023-08-24 ENCOUNTER — HOSPITAL ENCOUNTER (OUTPATIENT)
Age: 48
Setting detail: OUTPATIENT SURGERY
Discharge: HOME | End: 2023-08-24
Attending: INTERNAL MEDICINE | Admitting: INTERNAL MEDICINE
Payer: COMMERCIAL

## 2023-08-24 VITALS
DIASTOLIC BLOOD PRESSURE: 74 MMHG | WEIGHT: 171 LBS | SYSTOLIC BLOOD PRESSURE: 123 MMHG | TEMPERATURE: 98 F | OXYGEN SATURATION: 100 % | BODY MASS INDEX: 32.28 KG/M2 | HEART RATE: 62 BPM | RESPIRATION RATE: 16 BRPM | HEIGHT: 61 IN

## 2023-08-24 DIAGNOSIS — Z12.11 SCREENING FOR COLON CANCER: ICD-10-CM

## 2023-08-24 LAB
GLUCOSE BLD-MCNC: 156 MG/DL (ref 70–99)
HCG UR QL: NEGATIVE
PERFORMED ON: ABNORMAL

## 2023-08-24 PROCEDURE — 88305 TISSUE EXAM BY PATHOLOGIST: CPT

## 2023-08-24 PROCEDURE — 7100000011 HC PHASE II RECOVERY - ADDTL 15 MIN: Performed by: INTERNAL MEDICINE

## 2023-08-24 PROCEDURE — 2580000003 HC RX 258: Performed by: ANESTHESIOLOGY

## 2023-08-24 PROCEDURE — 3700000000 HC ANESTHESIA ATTENDED CARE: Performed by: INTERNAL MEDICINE

## 2023-08-24 PROCEDURE — 2500000003 HC RX 250 WO HCPCS: Performed by: NURSE ANESTHETIST, CERTIFIED REGISTERED

## 2023-08-24 PROCEDURE — 3609010600 HC COLONOSCOPY POLYPECTOMY SNARE/COLD BIOPSY: Performed by: INTERNAL MEDICINE

## 2023-08-24 PROCEDURE — 2709999900 HC NON-CHARGEABLE SUPPLY: Performed by: INTERNAL MEDICINE

## 2023-08-24 PROCEDURE — 7100000010 HC PHASE II RECOVERY - FIRST 15 MIN: Performed by: INTERNAL MEDICINE

## 2023-08-24 PROCEDURE — 6360000002 HC RX W HCPCS: Performed by: NURSE ANESTHETIST, CERTIFIED REGISTERED

## 2023-08-24 PROCEDURE — 84703 CHORIONIC GONADOTROPIN ASSAY: CPT

## 2023-08-24 PROCEDURE — 3700000001 HC ADD 15 MINUTES (ANESTHESIA): Performed by: INTERNAL MEDICINE

## 2023-08-24 RX ORDER — SODIUM CHLORIDE 0.9 % (FLUSH) 0.9 %
5-40 SYRINGE (ML) INJECTION EVERY 12 HOURS SCHEDULED
Status: DISCONTINUED | OUTPATIENT
Start: 2023-08-24 | End: 2023-08-24 | Stop reason: HOSPADM

## 2023-08-24 RX ORDER — ONDANSETRON 2 MG/ML
4 INJECTION INTRAMUSCULAR; INTRAVENOUS
Status: DISCONTINUED | OUTPATIENT
Start: 2023-08-24 | End: 2023-08-24 | Stop reason: HOSPADM

## 2023-08-24 RX ORDER — SODIUM CHLORIDE 9 MG/ML
INJECTION, SOLUTION INTRAVENOUS PRN
Status: DISCONTINUED | OUTPATIENT
Start: 2023-08-24 | End: 2023-08-24 | Stop reason: HOSPADM

## 2023-08-24 RX ORDER — DIPHENHYDRAMINE HYDROCHLORIDE 50 MG/ML
12.5 INJECTION INTRAMUSCULAR; INTRAVENOUS
Status: DISCONTINUED | OUTPATIENT
Start: 2023-08-24 | End: 2023-08-24 | Stop reason: HOSPADM

## 2023-08-24 RX ORDER — FENTANYL CITRATE 50 UG/ML
25 INJECTION, SOLUTION INTRAMUSCULAR; INTRAVENOUS EVERY 5 MIN PRN
Status: DISCONTINUED | OUTPATIENT
Start: 2023-08-24 | End: 2023-08-24 | Stop reason: HOSPADM

## 2023-08-24 RX ORDER — MEPERIDINE HYDROCHLORIDE 25 MG/ML
12.5 INJECTION INTRAMUSCULAR; INTRAVENOUS; SUBCUTANEOUS EVERY 5 MIN PRN
Status: DISCONTINUED | OUTPATIENT
Start: 2023-08-24 | End: 2023-08-24 | Stop reason: HOSPADM

## 2023-08-24 RX ORDER — SODIUM CHLORIDE 0.9 % (FLUSH) 0.9 %
5-40 SYRINGE (ML) INJECTION PRN
Status: DISCONTINUED | OUTPATIENT
Start: 2023-08-24 | End: 2023-08-24 | Stop reason: HOSPADM

## 2023-08-24 RX ORDER — ONDANSETRON 2 MG/ML
INJECTION INTRAMUSCULAR; INTRAVENOUS PRN
Status: DISCONTINUED | OUTPATIENT
Start: 2023-08-24 | End: 2023-08-24 | Stop reason: SDUPTHER

## 2023-08-24 RX ORDER — LIDOCAINE HYDROCHLORIDE 20 MG/ML
INJECTION, SOLUTION EPIDURAL; INFILTRATION; INTRACAUDAL; PERINEURAL PRN
Status: DISCONTINUED | OUTPATIENT
Start: 2023-08-24 | End: 2023-08-24 | Stop reason: SDUPTHER

## 2023-08-24 RX ORDER — PROPOFOL 10 MG/ML
INJECTION, EMULSION INTRAVENOUS CONTINUOUS PRN
Status: DISCONTINUED | OUTPATIENT
Start: 2023-08-24 | End: 2023-08-24 | Stop reason: SDUPTHER

## 2023-08-24 RX ORDER — LABETALOL HYDROCHLORIDE 5 MG/ML
5 INJECTION, SOLUTION INTRAVENOUS
Status: DISCONTINUED | OUTPATIENT
Start: 2023-08-24 | End: 2023-08-24 | Stop reason: HOSPADM

## 2023-08-24 RX ADMIN — SODIUM CHLORIDE: 9 INJECTION, SOLUTION INTRAVENOUS at 12:23

## 2023-08-24 RX ADMIN — PROPOFOL 300 MCG/KG/MIN: 10 INJECTION, EMULSION INTRAVENOUS at 12:40

## 2023-08-24 RX ADMIN — LIDOCAINE HYDROCHLORIDE 60 MG: 20 INJECTION, SOLUTION EPIDURAL; INFILTRATION; INTRACAUDAL; PERINEURAL at 12:40

## 2023-08-24 RX ADMIN — ONDANSETRON 4 MG: 2 INJECTION INTRAMUSCULAR; INTRAVENOUS at 13:23

## 2023-08-24 ASSESSMENT — LIFESTYLE VARIABLES: SMOKING_STATUS: 0

## 2023-08-24 ASSESSMENT — PAIN - FUNCTIONAL ASSESSMENT: PAIN_FUNCTIONAL_ASSESSMENT: NONE - DENIES PAIN

## 2023-08-24 ASSESSMENT — ENCOUNTER SYMPTOMS: SHORTNESS OF BREATH: 0

## 2023-08-24 NOTE — DISCHARGE INSTRUCTIONS
Discharge Instructions for Colonoscopy     Colonoscopy is a visual exam of the lining of the large intestine, also called the bowel or colon, with a colonoscope. A colonoscope is a flexible tube with a light and a viewing device. It allows the doctor to view the inside of the colon through a tiny video camera. Colonoscopy is performed for many reasons: unexplained anemia , pain, diarrhea , bloody stools, cancer screening, among many other reasons. Complications from a colonoscopy are rare. Some possible serious complications include perforated bowel (which might require surgery) and bleeding (which could require blood transfusion ). Minor complications include bloating, gas, and cramping that can last for 1-2 days after the procedure. Because air is put into your colon during the procedure, it is normal to pass large amounts of air from your rectum. You may not have a bowel movement for 1-3 days after the procedure. What You Will Need:  Someone to drive you home after the procedure     Steps to Take:  [address] when you get home. Because the sedative will make you drowsy, don't drive, operate machinery, or make important decisions the day of the procedure. Feelings of bloating, gas, or cramping may persist for 24 hours. Diet -  Try sips of water first. If tolerated, resume bland food (scrambled eggs, toast, soup) first.  If tolerated, resume regular diet or the diet recommended by your physician. Do not drink alcohol for 24 hours. Physical Activity -  Ask your doctor when you will be able to return to work. Do not drive, operate heavy machinery, or do activities that require coordination or balance for 24 hours. Otherwise, return to your normal routine as soon as you are comfortable to do so, which is usually the next day after the procedure. Medications - When taking medications, it's important to:    Take your medication as directed, not more, not less, not at a different

## 2023-08-24 NOTE — ANESTHESIA POSTPROCEDURE EVALUATION
Department of Anesthesiology  Postprocedure Note    Patient: Reji Bliss  MRN: [id number]  YOB: 1975  Date of evaluation: 8/24/2023      Procedure Summary     Date: 08/24/23 Room / Location: [address] [place] [address]    Anesthesia Start: 1236 Anesthesia Stop: 1321    Procedure: COLONOSCOPY POLYPECTOMY SNARE/COLD BIOPSY Diagnosis:       Screening for colon cancer      (Screening for colon cancer [Z12.11])    Surgeons: Yolanda Escobar MD Responsible Provider: Gilbert Clements MD    Anesthesia Type: MAC ASA Status: 2          Anesthesia Type: No value filed.     Gloria Phase I: Gloria Score: 10    Gloria Phase II: Gloria Score: 10      Anesthesia Post Evaluation    Patient location during evaluation: PACU  Patient participation: complete - patient participated  Level of consciousness: awake  Airway patency: patent  Nausea & Vomiting: no nausea  Complications: no  Cardiovascular status: hemodynamically stable  Respiratory status: acceptable  Hydration status: euvolemic  Multimodal analgesia pain management approach

## 2023-08-24 NOTE — ANESTHESIA PRE PROCEDURE
Department of Anesthesiology  Preprocedure Note       Name:  Ama Lynne   Age:  51 y.o.  :  1975                                          MRN:  [id number]         Date:  2023      Surgeon: Cristian Horner):  Cristina Johnson MD    Procedure: Procedure(s):  COLORECTAL CANCER SCREENING, NOT HIGH RISK    Medications prior to admission:   Prior to Admission medications    Medication Sig Start Date End Date Taking? Authorizing Provider   dicyclomine (BENTYL) 10 MG capsule Take 1 capsule by mouth 4 times daily 8/15/23   Lakisha Koenig MD   [facility]ron [facility]) 4 MG tablet Take 1 tablet by mouth every 8 hours as needed for Nausea 8/15/23   Lakisha Koenig MD   potassium chloride (KLOR-CON M) 10 MEQ extended release tablet Take 1 tablet by mouth 2 times daily 8/15/23   Lakisha Koenig MD   olmesartan-hydroCHLOROthiazide (BENICAR HCT) 40-25 MG per tablet Take 1 tablet by mouth daily    Historical Provider, MD   Tirzepatide [facility]) 2.5 MG/0.5ML SOPN SC injection Inject 0.5 mLs into the skin once a week    Historical Provider, MD   acetaminophen (TYLENOL) 500 MG tablet Take 2 tablets by mouth every 6 hours as needed for Pain 3/21/22   Laci Pérez MD   cyclobenzaprine (FLEXERIL) 10 MG tablet Take 1 tablet by mouth 3 times daily as needed for Muscle spasms    Historical Provider, MD   atorvastatin (LIPITOR) 40 MG tablet Take 1 tablet by mouth daily    Historical Provider, MD   meloxicam (MOBIC) 15 MG tablet Take 1 tablet by mouth daily  Patient not taking: Reported on 2023 3/31/20   Laci Pérez MD   amLODIPine (NORVASC) 5 MG tablet Take 1 tablet by mouth daily    Historical Provider, MD   fluconazole (DIFLUCAN) 150 MG tablet Take 1 tablet by mouth every 14 days 12/13/15   Historical Provider, MD   aspirin 81 MG EC tablet Take 1 tablet by mouth daily    Historical Provider, MD   FreeStyle Lancets MISC by Does not apply route.  13   Karin Henderson PA-C   glucose blood VI test strips

## 2023-08-24 NOTE — OP NOTE
Colonoscopy Procedure Note      Patient: Oscar Johnson  : 1975  Acct#:     Procedure: Colonoscopy with biopsy    Date:  2023    Surgeon:  Maddison Cervantes MD    Referring Physician:  Weldon Kayser, MD    Previous Colonoscopy: NO    Preoperative Diagnosis:  51 y.o. female with situs inversus presents for colonoscopy for screening. She also notes recent CT scan with possible colitis. Denies first degree relatives with colon cancer. Postoperative Diagnosis:  Evidence of mild colitis of the transverse colon. Small internal hemorrhoids seen on retroflexion in the rectum. Biopsies taken throughout the colon. Consent:  The patient or their legal guardian has signed a consent, and is aware of the potential risks, benefits, alternatives, and potential complications of this procedure. These include, but are not limited to hemorrhage, bleeding, post procedural pain, perforation, phlebitis, aspiration, hypotension, hypoxia, cardiovascular events such as arryhthmia, and possibly death. Additionally, the possibility of missed colonic polyps and interval colon cancer was discussed in the consent. Anesthesia:  MAC, see anesthesia documentation     Procedure: An informed consent was obtained from the patient after explanation of indications, benefits, possible risks and complications of the procedure. The patient was then taken to the endoscopy suite, placed in the left lateral decubitus position, and the above IV anesthesia was administered. A digital rectal examination was performed and revealed negative without mass, lesions or tenderness. The Olympus video colonoscope was placed in the patient's rectum under digital direction and advanced to the cecum. The cecum was identified by characteristic anatomy and ballottment. The preparation was fair in the proximal colon. The ileocecal valve was identified. The terminal ileum was unremarkable.     The scope was then withdrawn back

## 2024-03-20 ENCOUNTER — HOSPITAL ENCOUNTER (EMERGENCY)
Age: 49
Discharge: HOME | End: 2024-03-21
Payer: COMMERCIAL

## 2024-03-20 VITALS
RESPIRATION RATE: 20 BRPM | HEIGHT: 60 IN | OXYGEN SATURATION: 96 % | BODY MASS INDEX: 32.96 KG/M2 | TEMPERATURE: 97.8 F | WEIGHT: 167.9 LBS | HEART RATE: 80 BPM | SYSTOLIC BLOOD PRESSURE: 166 MMHG | DIASTOLIC BLOOD PRESSURE: 110 MMHG

## 2024-03-20 DIAGNOSIS — M79.10 MYALGIA: Primary | ICD-10-CM

## 2024-03-20 DIAGNOSIS — R73.9 HYPERGLYCEMIA: ICD-10-CM

## 2024-03-20 LAB
ALBUMIN SERPL-MCNC: 4.4 G/DL (ref 3.4–5)
ALBUMIN/GLOB SERPL: 1.6 {RATIO} (ref 1.1–2.2)
ALP SERPL-CCNC: 74 U/L (ref 40–129)
ALT SERPL-CCNC: 25 U/L (ref 10–40)
ANION GAP SERPL CALCULATED.3IONS-SCNC: 10 MMOL/L (ref 3–16)
AST SERPL-CCNC: 18 U/L (ref 15–37)
BASOPHILS # BLD: 0 K/UL (ref 0–0.2)
BASOPHILS NFR BLD: 0.5 %
BILIRUB SERPL-MCNC: 0.4 MG/DL (ref 0–1)
BUN SERPL-MCNC: 13 MG/DL (ref 7–20)
CALCIUM SERPL-MCNC: 9.5 MG/DL (ref 8.3–10.6)
CHLORIDE SERPL-SCNC: 98 MMOL/L (ref 99–110)
CK SERPL-CCNC: 138 U/L (ref 26–192)
CO2 SERPL-SCNC: 25 MMOL/L (ref 21–32)
CREAT SERPL-MCNC: <0.5 MG/DL (ref 0.6–1.1)
D DIMER: 0.29 UG/ML FEU (ref 0–0.6)
DEPRECATED RDW RBC AUTO: 14.2 % (ref 12.4–15.4)
EOSINOPHIL # BLD: 0 K/UL (ref 0–0.6)
EOSINOPHIL NFR BLD: 0.2 %
GFR SERPLBLD CREATININE-BSD FMLA CKD-EPI: >60 ML/MIN/{1.73_M2}
GLUCOSE SERPL-MCNC: 342 MG/DL (ref 70–99)
HCT VFR BLD AUTO: 40.9 % (ref 36–48)
HGB BLD-MCNC: 14 G/DL (ref 12–16)
LYMPHOCYTES # BLD: 1.7 K/UL (ref 1–5.1)
LYMPHOCYTES NFR BLD: 24.5 %
MCH RBC QN AUTO: 30.9 PG (ref 26–34)
MCHC RBC AUTO-ENTMCNC: 34.2 G/DL (ref 31–36)
MCV RBC AUTO: 90.3 FL (ref 80–100)
MONOCYTES # BLD: 0.4 K/UL (ref 0–1.3)
MONOCYTES NFR BLD: 5.3 %
NEUTROPHILS # BLD: 5 K/UL (ref 1.7–7.7)
NEUTROPHILS NFR BLD: 69.5 %
PLATELET # BLD AUTO: 243 K/UL (ref 135–450)
PMV BLD AUTO: 7.9 FL (ref 5–10.5)
POTASSIUM SERPL-SCNC: 3.8 MMOL/L (ref 3.5–5.1)
PROT SERPL-MCNC: 7.2 G/DL (ref 6.4–8.2)
RBC # BLD AUTO: 4.53 M/UL (ref 4–5.2)
SODIUM SERPL-SCNC: 133 MMOL/L (ref 136–145)
WBC # BLD AUTO: 7.1 K/UL (ref 4–11)

## 2024-03-20 PROCEDURE — 2580000003 HC RX 258: Performed by: PHYSICIAN ASSISTANT

## 2024-03-20 PROCEDURE — 85379 FIBRIN DEGRADATION QUANT: CPT

## 2024-03-20 PROCEDURE — 80053 COMPREHEN METABOLIC PANEL: CPT

## 2024-03-20 PROCEDURE — 6360000002 HC RX W HCPCS

## 2024-03-20 PROCEDURE — 96361 HYDRATE IV INFUSION ADD-ON: CPT

## 2024-03-20 PROCEDURE — 82550 ASSAY OF CK (CPK): CPT

## 2024-03-20 PROCEDURE — 99284 EMERGENCY DEPT VISIT MOD MDM: CPT

## 2024-03-20 PROCEDURE — 85025 COMPLETE CBC W/AUTO DIFF WBC: CPT

## 2024-03-20 PROCEDURE — 96374 THER/PROPH/DIAG INJ IV PUSH: CPT

## 2024-03-20 RX ORDER — KETOROLAC TROMETHAMINE 15 MG/ML
15 INJECTION, SOLUTION INTRAMUSCULAR; INTRAVENOUS ONCE
Status: COMPLETED | OUTPATIENT
Start: 2024-03-20 | End: 2024-03-20

## 2024-03-20 RX ORDER — 0.9 % SODIUM CHLORIDE 0.9 %
1000 INTRAVENOUS SOLUTION INTRAVENOUS ONCE
Status: COMPLETED | OUTPATIENT
Start: 2024-03-20 | End: 2024-03-21

## 2024-03-20 RX ORDER — KETOROLAC TROMETHAMINE 15 MG/ML
INJECTION, SOLUTION INTRAMUSCULAR; INTRAVENOUS
Status: COMPLETED
Start: 2024-03-20 | End: 2024-03-20

## 2024-03-20 RX ADMIN — KETOROLAC TROMETHAMINE 15 MG: 15 INJECTION, SOLUTION INTRAMUSCULAR; INTRAVENOUS at 22:56

## 2024-03-20 RX ADMIN — SODIUM CHLORIDE 1000 ML: 9 INJECTION, SOLUTION INTRAVENOUS at 22:52

## 2024-03-20 ASSESSMENT — ENCOUNTER SYMPTOMS
VOMITING: 0
ABDOMINAL PAIN: 0
RHINORRHEA: 0
WHEEZING: 0
SHORTNESS OF BREATH: 0
NAUSEA: 0
DIARRHEA: 0
COUGH: 0

## 2024-03-20 ASSESSMENT — PAIN SCALES - GENERAL: PAINLEVEL_OUTOF10: 7

## 2024-03-21 NOTE — ED PROVIDER NOTES
Skin:     General: Skin is warm and dry.   Neurological:      Mental Status: She is alert and oriented to person, place, and time.      Sensory: Sensation is intact.      Motor: Motor function is intact.   Psychiatric:         Behavior: Behavior normal.         DIAGNOSTIC RESULTS   LABS:    Labs Reviewed   COMPREHENSIVE METABOLIC PANEL W/ REFLEX TO MG FOR LOW K - Abnormal; Notable for the following components:       Result Value    Sodium 133 (*)     Chloride 98 (*)     Glucose 342 (*)     Creatinine <0.5 (*)     All other components within normal limits   CBC WITH AUTO DIFFERENTIAL   D-DIMER, QUANTITATIVE   CK       When ordered only abnormal lab results are displayed. All other labs were within normal range or not returned as of this dictation.    EKG: When ordered, EKG's are interpreted by the Emergency Department Physician in the absence of a cardiologist.  Please see their note for interpretation of EKG.    RADIOLOGY:   Non-plain film images such as CT, Ultrasound and MRI are read by the radiologist. Plain radiographic images are visualized and preliminarily interpreted by the ED Provider with the below findings:      Interpretation per the Radiologist below, if available at the time of this note:    No orders to display     No results found.    No results found.    PROCEDURES   Unless otherwise noted below, none     Procedures    CRITICAL CARE TIME (.cctime)       PAST MEDICAL HISTORY      has a past medical history of Arthritis, Bacterial vaginal infection, Dextrocardia, Diabetes type 2, uncontrolled, Hyperlipidemia, Hypertension, Inversus, situs, Non compliance w medication regimen, and Obesity (BMI 30-39.9).     EMERGENCY DEPARTMENT COURSE and DIFFERENTIAL DIAGNOSIS/MDM:   Vitals:    Vitals:    03/20/24 2137   BP: (!) 166/110   Pulse: 80   Resp: 20   Temp: 97.8 °F (36.6 °C)   TempSrc: Oral   SpO2: 96%   Weight: 76.2 kg (167 lb 14.4 oz)   Height: 1.524 m (5')       Patient was given the following

## 2024-08-01 ENCOUNTER — HOSPITAL ENCOUNTER (EMERGENCY)
Age: 49
Discharge: HOME | End: 2024-08-01
Payer: COMMERCIAL

## 2024-08-01 VITALS
HEIGHT: 60 IN | DIASTOLIC BLOOD PRESSURE: 70 MMHG | SYSTOLIC BLOOD PRESSURE: 126 MMHG | OXYGEN SATURATION: 100 % | HEART RATE: 79 BPM | TEMPERATURE: 99.3 F | RESPIRATION RATE: 18 BRPM | WEIGHT: 175 LBS | BODY MASS INDEX: 34.36 KG/M2

## 2024-08-01 DIAGNOSIS — M54.16 RIGHT LUMBAR RADICULOPATHY: Primary | ICD-10-CM

## 2024-08-01 PROCEDURE — 99284 EMERGENCY DEPT VISIT MOD MDM: CPT

## 2024-08-01 PROCEDURE — 6360000002 HC RX W HCPCS: Performed by: PHYSICIAN ASSISTANT

## 2024-08-01 PROCEDURE — 96372 THER/PROPH/DIAG INJ SC/IM: CPT

## 2024-08-01 RX ORDER — NAPROXEN 500 MG/1
500 TABLET ORAL 2 TIMES DAILY WITH MEALS
Qty: 60 TABLET | Refills: 0 | Status: SHIPPED | OUTPATIENT
Start: 2024-08-01

## 2024-08-01 RX ORDER — CYCLOBENZAPRINE HCL 10 MG
10 TABLET ORAL 3 TIMES DAILY PRN
Qty: 20 TABLET | Refills: 0 | Status: SHIPPED | OUTPATIENT
Start: 2024-08-01 | End: 2024-08-11

## 2024-08-01 RX ORDER — ORPHENADRINE CITRATE 30 MG/ML
60 INJECTION INTRAMUSCULAR; INTRAVENOUS ONCE
Status: COMPLETED | OUTPATIENT
Start: 2024-08-01 | End: 2024-08-01

## 2024-08-01 RX ORDER — METHYLPREDNISOLONE 4 MG/1
TABLET ORAL
Qty: 1 KIT | Refills: 0 | Status: SHIPPED | OUTPATIENT
Start: 2024-08-01

## 2024-08-01 RX ORDER — KETOROLAC TROMETHAMINE 30 MG/ML
60 INJECTION, SOLUTION INTRAMUSCULAR; INTRAVENOUS ONCE
Status: COMPLETED | OUTPATIENT
Start: 2024-08-01 | End: 2024-08-01

## 2024-08-01 RX ADMIN — ORPHENADRINE CITRATE 60 MG: 60 INJECTION INTRAMUSCULAR; INTRAVENOUS at 20:07

## 2024-08-01 RX ADMIN — KETOROLAC TROMETHAMINE 60 MG: 60 INJECTION, SOLUTION INTRAMUSCULAR at 20:07

## 2024-08-01 ASSESSMENT — PAIN SCALES - GENERAL
PAINLEVEL_OUTOF10: 8
PAINLEVEL_OUTOF10: 8

## 2024-08-01 ASSESSMENT — ENCOUNTER SYMPTOMS
DIARRHEA: 0
NAUSEA: 0
VOMITING: 0
COUGH: 0
CHEST TIGHTNESS: 0
SHORTNESS OF BREATH: 0
BACK PAIN: 1
ABDOMINAL PAIN: 0

## 2024-08-01 ASSESSMENT — LIFESTYLE VARIABLES: HOW OFTEN DO YOU HAVE A DRINK CONTAINING ALCOHOL: MONTHLY OR LESS

## 2024-08-01 ASSESSMENT — PAIN DESCRIPTION - PAIN TYPE: TYPE: ACUTE PAIN

## 2024-08-01 ASSESSMENT — PAIN - FUNCTIONAL ASSESSMENT: PAIN_FUNCTIONAL_ASSESSMENT: 0-10

## 2024-08-01 ASSESSMENT — PAIN DESCRIPTION - LOCATION: LOCATION: HIP

## 2024-08-01 ASSESSMENT — PAIN DESCRIPTION - FREQUENCY: FREQUENCY: CONTINUOUS

## 2024-08-02 NOTE — ED PROVIDER NOTES
[facility] EMERGENCY DEPARTMENT  EMERGENCY DEPARTMENT ENCOUNTER        Pt Name: Moon Martins  MRN: [id number]  Birthdate 1975  Date of evaluation: 8/1/2024  Provider: Jabari Hennessy PA-C  PCP: Roman Smith MD  Note Started: 8:03 PM EDT 8/1/24      RENETTA. I have evaluated this patient.        CHIEF COMPLAINT       Chief Complaint   Patient presents with    Hip Pain     Pt c/o R hip pain that began a few months ago. Per pt the pain got worse today and she began having numbness.       HISTORY OF PRESENT ILLNESS: 1 or more Elements     History from : Patient    Limitations to history : None    Moon Martins is a 48 y.o. female who presents to the emergency department today complaining of right-sided low back pain.  Patient has a documented history of low back pain and sciatica.  She states she has been having flareups intermittently over the last few months.  She states she was bending down feeding her dog prior to arrival and feels she worsened her pain.  She states most of her pain is in the right buttock and right posterior hip.  She states she does occasionally experience tingling in the right leg.  She denies changes in bowel or bladder habits, incontinence, saddle paresthesia or weakness in her extremities.  She has no GI,  or urinary complaints        Nursing Notes were all reviewed and agreed with or any disagreements were addressed in the HPI.    REVIEW OF SYSTEMS :      Review of Systems   Constitutional:  Negative for chills and fever.   Respiratory:  Negative for cough, chest tightness and shortness of breath.    Cardiovascular:  Negative for chest pain.   Gastrointestinal:  Negative for abdominal pain, diarrhea, nausea and vomiting.   Genitourinary:  Negative for difficulty urinating, dysuria, flank pain, frequency, hematuria and urgency.   Musculoskeletal:  Positive for back pain. Negative for neck pain and neck stiffness.   Neurological:  Negative for dizziness,

## (undated) DEVICE — (6) MINITAPE (BLUE) 39.5: Brand: MINITAPE

## (undated) DEVICE — PACK PROCEDURE SURG SHLDR MFFOP CUST

## (undated) DEVICE — 3M™ STERI-DRAPE™ U-DRAPE 1015: Brand: STERI-DRAPE™

## (undated) DEVICE — BOWL MED L 32OZ PLAS W/ MOLD GRAD EZ OPN PEEL PCH

## (undated) DEVICE — GAUZE,SPONGE,4"X4",8PLY,STRL,LF,10/TRAY: Brand: MEDLINE

## (undated) DEVICE — BANDAGE ADH W1XL3IN NAT FAB WVN FLX DURABLE N ADH PD SEAL

## (undated) DEVICE — HYPODERMIC SAFETY NEEDLE: Brand: MAGELLAN

## (undated) DEVICE — SHOULDER STABILIZATION KIT,                                    DISPOSABLE 12 PER BOX

## (undated) DEVICE — MEDI-VAC NON-CONDUCTIVE SUCTION TUBING: Brand: CARDINAL HEALTH

## (undated) DEVICE — MASC TURNOVER KIT: Brand: MEDLINE INDUSTRIES, INC.

## (undated) DEVICE — GOWN SIRUS NONREIN XL W/TWL: Brand: MEDLINE INDUSTRIES, INC.

## (undated) DEVICE — DYONICS 25 PATIENT TUBE SET MUST                                    BE USED WITH 7211007, 12 PER BOX

## (undated) DEVICE — BLANKET WRM W40.2XL55.9IN IORT LO BODY + MISTRAL AIR

## (undated) DEVICE — ULTRABRAID 2 COBRAID 38 LENGTH SINGL: Brand: ULTRABRAID

## (undated) DEVICE — FIRSTPASS ST SUTURE PASSER, STANDARD: Brand: FIRSTPASS

## (undated) DEVICE — GOWN SIRUS NONREIN LG W/TWL: Brand: MEDLINE INDUSTRIES, INC.

## (undated) DEVICE — SOLUTION IRRIG 500ML 0.9% SOD CHL USP POUR PLAS BTL

## (undated) DEVICE — MAT FLR ABS DISP

## (undated) DEVICE — MICRORAPTOR KNOTLESS DRILL 2.6MM: Brand: MICRORAPTOR

## (undated) DEVICE — Device

## (undated) DEVICE — SUTURE ETHLN SZ 4-0 L18IN NONABSORBABLE BLK L19MM PS-2 3/8 1667H

## (undated) DEVICE — SYRINGE, LUER LOCK, 60ML: Brand: MEDLINE

## (undated) DEVICE — SLING ORTH COMFORTABLE MED 11-13 HND STRP HK ULTRASLING II

## (undated) DEVICE — SUTURE PROL SZ 0 L30IN NONABSORBABLE BLU MO-6 L26MM 1/2 CIR 8418H

## (undated) DEVICE — 5.5 MM ELITE ACROMIOBLASTER                                    STRAIGHT DISPOSABLE BURRS, BRICK                                    RED, 10000 MAXIMUM RPM, PACKAGED 6                                    PER BOX, STERILE

## (undated) DEVICE — GLOVE ORANGE PI 8   MSG9080

## (undated) DEVICE — T-MAX DISPOSABLE FACE MASK 8 PER BOX

## (undated) DEVICE — CANNULA THREADED FLEX 8.0 X 72MM: Brand: CLEAR-TRAC

## (undated) DEVICE — INCISOR PLUS PLATINUM 4.5 MM BLADE: Brand: DYONICS INCISOR

## (undated) DEVICE — FORCEPS BX 240CM 2.4MM L NDL RAD JAW 4 M00513334

## (undated) DEVICE — SLING ARM M FOR 11-13IN UNIV PREM QUAL BRTH EXTRA PD FAB W/

## (undated) DEVICE — WEREWOLF FLOW 90 COBLATION WAND: Brand: COBLATION